# Patient Record
Sex: MALE | Race: WHITE | NOT HISPANIC OR LATINO | Employment: FULL TIME | URBAN - METROPOLITAN AREA
[De-identification: names, ages, dates, MRNs, and addresses within clinical notes are randomized per-mention and may not be internally consistent; named-entity substitution may affect disease eponyms.]

---

## 2017-01-05 ENCOUNTER — ANTICOAGULATION THERAPY VISIT (OUTPATIENT)
Dept: ANTICOAGULATION | Facility: CLINIC | Age: 32
End: 2017-01-05

## 2017-01-05 DIAGNOSIS — Z79.01 LONG-TERM (CURRENT) USE OF ANTICOAGULANTS: ICD-10-CM

## 2017-01-05 DIAGNOSIS — I26.99 PULMONARY EMBOLISM WITH INFARCTION (H): Primary | ICD-10-CM

## 2017-01-05 LAB — INR PPP: 2.6

## 2017-01-05 NOTE — PROGRESS NOTES
ANTICOAGULATION FOLLOW-UP CLINIC VISIT    Patient Name:  Hayden Ramires  Date:  1/5/2017  Contact Type:  Telephone    SUBJECTIVE:     Patient Findings     Positives No Problem Findings           OBJECTIVE    INR   Date Value Ref Range Status   01/05/2017 2.6  Final       ASSESSMENT / PLAN  INR assessment THER    Recheck INR In: 2 WEEKS    INR Location Home INR      Anticoagulation Summary as of 1/5/2017     INR goal 2.0-3.0   Selected INR 2.6 (1/5/2017)   Maintenance plan 10 mg (5 mg x 2) on Thu; 7.5 mg (5 mg x 1.5) all other days   Full instructions 10 mg on Thu; 7.5 mg all other days   Weekly total 55 mg   No change documented Salud Zavala, BRAYDEN   Plan last modified Adam Lo, [facility] (9/15/2016)   Next INR check 1/19/2017   Priority INR   Target end date Indefinite    Indications   Pulmonary embolism with infarction (H) [I26.99] [I26.99]  Long-term (current) use of anticoagulants [Z79.01] [Z79.01]         Anticoagulation Episode Summary     INR check location     Preferred lab     Send INR reminders to [facility]    Comments Emergency Contact Janae Nguyen   [phone]  HIPPA INFO OK to speak with Mother Janae Mittal [phone]  May leave messages on cell phone  pt prefers to be closer to 3 than 2  Contact Ph [phone]      Anticoagulation Care Providers     Provider Role Specialty Phone number    Sinan Lagunas MD Responsible Hematology [phone]            See the Encounter Report to view Anticoagulation Flowsheet and Dosing Calendar (Go to Encounters tab in chart review, and find the Anticoagulation Therapy Visit)    Spoke with Jerome Zavala, RN

## 2017-01-23 ENCOUNTER — ANTICOAGULATION THERAPY VISIT (OUTPATIENT)
Dept: ANTICOAGULATION | Facility: CLINIC | Age: 32
End: 2017-01-23

## 2017-01-23 DIAGNOSIS — Z79.01 LONG-TERM (CURRENT) USE OF ANTICOAGULANTS: ICD-10-CM

## 2017-01-23 DIAGNOSIS — I26.99 PULMONARY EMBOLISM WITH INFARCTION (H): Primary | ICD-10-CM

## 2017-01-23 LAB — INR PPP: 2.1

## 2017-01-23 NOTE — PROGRESS NOTES
ANTICOAGULATION FOLLOW-UP CLINIC VISIT    Patient Name:  Hayden Ramires  Date:  1/23/2017  Contact Type:  Telephone    SUBJECTIVE:        OBJECTIVE    INR   Date Value Ref Range Status   01/23/2017 2.1  Final       ASSESSMENT / PLAN  INR assessment THER    Recheck INR In: 2 WEEKS    INR Location Home INR      Anticoagulation Summary as of 1/23/2017     INR goal 2.0-3.0   Selected INR 2.1 (1/23/2017)   Maintenance plan 10 mg (5 mg x 2) on Thu; 7.5 mg (5 mg x 1.5) all other days   Full instructions 10 mg on Thu; 7.5 mg all other days   Weekly total 55 mg   Plan last modified Adam Lo RP (9/15/2016)   Next INR check 2/6/2017   Priority INR   Target end date Indefinite    Indications   Pulmonary embolism with infarction (H) [I26.99] [I26.99]  Long-term (current) use of anticoagulants [Z79.01] [Z79.01]         Anticoagulation Episode Summary     INR check location     Preferred lab     Send INR reminders to [facility]    Comments Emergency Contact Janae Nguyen   [phone]  HIPPA INFO OK to speak with Mother Janae Mittal [phone]  May leave messages on cell phone  pt prefers to be closer to 3 than 2  Contact Ph [phone]      Anticoagulation Care Providers     Provider Role Specialty Phone number    Sinan Lagunas MD Responsible Hematology [phone]            See the Encounter Report to view Anticoagulation Flowsheet and Dosing Calendar (Go to Encounters tab in chart review, and find the Anticoagulation Therapy Visit)    Spoke with patient. Gave them their lab results and new warfarin recommendation.  No changes in health, medication, or diet. No missed doses, no falls. No signs or symptoms of bleed or clotting.      Adam Lo [facility]

## 2017-02-10 ENCOUNTER — ANTICOAGULATION THERAPY VISIT (OUTPATIENT)
Dept: ANTICOAGULATION | Facility: CLINIC | Age: 32
End: 2017-02-10

## 2017-02-10 DIAGNOSIS — I26.99 PULMONARY EMBOLISM WITH INFARCTION (H): Primary | ICD-10-CM

## 2017-02-10 DIAGNOSIS — Z79.01 LONG-TERM (CURRENT) USE OF ANTICOAGULANTS: ICD-10-CM

## 2017-02-10 LAB — INR PPP: 2.5

## 2017-02-10 NOTE — PROGRESS NOTES
ANTICOAGULATION FOLLOW-UP CLINIC VISIT    Patient Name:  Hayden Ramires  Date:  2/10/2017  Contact Type:  Telephone    SUBJECTIVE:     Patient Findings     Positives No Problem Findings           OBJECTIVE    INR   Date Value Ref Range Status   02/02/2017 2.5  Final       ASSESSMENT / PLAN  INR assessment THER    Recheck INR In: 2 WEEKS    INR Location Home INR      Anticoagulation Summary as of 2/10/2017     INR goal 2.0-3.0   Selected INR 2.5 (2/2/2017)   Maintenance plan 10 mg (5 mg x 2) on Thu; 7.5 mg (5 mg x 1.5) all other days   Full instructions 10 mg on Thu; 7.5 mg all other days   Weekly total 55 mg   Plan last modified Adam Lo, [facility] (9/15/2016)   Next INR check 2/23/2017   Priority INR   Target end date Indefinite    Indications   Pulmonary embolism with infarction (H) [I26.99] [I26.99]  Long-term (current) use of anticoagulants [Z79.01] [Z79.01]         Anticoagulation Episode Summary     INR check location     Preferred lab     Send INR reminders to [facility]    Comments Emergency Contact Janae Nguyen   [phone]  HIPPA INFO OK to speak with Mother Janae Mittal [phone]  May leave messages on cell phone  pt prefers to be closer to 3 than 2  Contact Ph [phone]      Anticoagulation Care Providers     Provider Role Specialty Phone number    Sinan Lagunas MD Responsible Hematology [phone]            See the Encounter Report to view Anticoagulation Flowsheet and Dosing Calendar (Go to Encounters tab in chart review, and find the Anticoagulation Therapy Visit)    Spoke with Hayden.      Salud Bosch RN

## 2017-02-28 ENCOUNTER — ANTICOAGULATION THERAPY VISIT (OUTPATIENT)
Dept: ANTICOAGULATION | Facility: CLINIC | Age: 32
End: 2017-02-28

## 2017-02-28 DIAGNOSIS — Z79.01 LONG-TERM (CURRENT) USE OF ANTICOAGULANTS: ICD-10-CM

## 2017-02-28 DIAGNOSIS — I26.99 PULMONARY EMBOLISM WITH INFARCTION (H): ICD-10-CM

## 2017-02-28 LAB — INR PPP: 2.3

## 2017-02-28 NOTE — MR AVS SNAPSHOT
Hayden TAYLOR Ike   2/28/2017   Anticoagulation Therapy Visit    Description:  31 year old male   Provider:  Soledad Resendiz, RN   Department:  [facility]           INR as of 2/28/2017     Today's INR 2.3      Anticoagulation Summary as of 2/28/2017     INR goal 2.0-3.0   Today's INR 2.3   Full instructions 10 mg on Thu; 7.5 mg all other days   Next INR check 3/14/2017    Indications   Pulmonary embolism with infarction (H) [I26.99] [I26.99]  Long-term (current) use of anticoagulants [Z79.01] [Z79.01]         February 2017 Details    Sun Mon Tue Wed Thu Fri Sat        1               2               3               4                 5               6               7               8               9               10               11                 12               13               14               15               16               17               18                 19               20               21               22               23               24               25                 26               27               28      7.5 mg   See details           Date Details   02/28 This INR check               How to take your warfarin dose     To take:  7.5 mg Take 1.5 of the 5 mg tablets.           March 2017 Details    Sun Mon Tue Wed Thu Fri Sat        1      7.5 mg         2      10 mg         3      7.5 mg         4      7.5 mg           5      7.5 mg         6      7.5 mg         7      7.5 mg         8      7.5 mg         9      10 mg         10      7.5 mg         11      7.5 mg           12      7.5 mg         13      7.5 mg         14            15               16               17               18                 19               20               21               22               23               24               25                 26               27               28               29               30               31                 Date Details   No additional details    Date of next INR:   3/14/2017         How to take your warfarin dose     To take:  7.5 mg Take 1.5 of the 5 mg tablets.    To take:  10 mg Take 2 of the 5 mg tablets.

## 2017-02-28 NOTE — PROGRESS NOTES
ANTICOAGULATION FOLLOW-UP CLINIC VISIT    Patient Name:  Hayden Ramires  Date:  2/28/2017  Contact Type:  Telephone    SUBJECTIVE:     Patient Findings     Positives No Problem Findings           OBJECTIVE    INR   Date Value Ref Range Status   02/28/2017 2.3  Final       ASSESSMENT / PLAN  INR assessment THER    Recheck INR In: 2 WEEKS    INR Location Clinic      Anticoagulation Summary as of 2/28/2017     INR goal 2.0-3.0   Today's INR 2.3   Maintenance plan 10 mg (5 mg x 2) on Thu; 7.5 mg (5 mg x 1.5) all other days   Full instructions 10 mg on Thu; 7.5 mg all other days   Weekly total 55 mg   No change documented Soledad Resendiz, RN   Plan last modified Adam Lo, [facility] (9/15/2016)   Next INR check 3/14/2017   Priority INR   Target end date Indefinite    Indications   Pulmonary embolism with infarction (H) [I26.99] [I26.99]  Long-term (current) use of anticoagulants [Z79.01] [Z79.01]         Anticoagulation Episode Summary     INR check location     Preferred lab     Send INR reminders to [facility]    Comments Emergency Contact Janae Nguyen   [phone]  HIPPA INFO OK to speak with Mother Janae Mittal [phone]  May leave messages on cell phone  pt prefers to be closer to 3 than 2  Contact Ph [phone]      Anticoagulation Care Providers     Provider Role Specialty Phone number    Sinan Lagunas MD Responsible Hematology [phone]            See the Encounter Report to view Anticoagulation Flowsheet and Dosing Calendar (Go to Encounters tab in chart review, and find the Anticoagulation Therapy Visit)    Spoke with Jerome Resendiz, RN

## 2017-03-15 ENCOUNTER — ANTICOAGULATION THERAPY VISIT (OUTPATIENT)
Dept: ANTICOAGULATION | Facility: CLINIC | Age: 32
End: 2017-03-15

## 2017-03-15 DIAGNOSIS — I26.99 PULMONARY EMBOLISM WITH INFARCTION (H): ICD-10-CM

## 2017-03-15 DIAGNOSIS — Z79.01 LONG-TERM (CURRENT) USE OF ANTICOAGULANTS: ICD-10-CM

## 2017-03-15 NOTE — PROGRESS NOTES
ANTICOAGULATION FOLLOW-UP CLINIC VISIT    Patient Name:  Hayden Ramires  Date:  3/15/2017  Contact Type:  Telephone    SUBJECTIVE:        OBJECTIVE    INR   Date Value Ref Range Status   02/28/2017 2.3  Final       ASSESSMENT / PLAN  INR assessment THER    Recheck INR In: 2 WEEKS    INR Location Home INR      Anticoagulation Summary as of 3/15/2017     INR goal 2.0-3.0   Today's INR    Maintenance plan 10 mg (5 mg x 2) on Thu; 7.5 mg (5 mg x 1.5) all other days   Full instructions 10 mg on Thu; 7.5 mg all other days   Weekly total 55 mg   No change documented Adam Lo RPH   Plan last modified Adam Lo RPH (9/15/2016)   Next INR check 3/22/2017   Priority INR   Target end date Indefinite    Indications   Pulmonary embolism with infarction (H) [I26.99] [I26.99]  Long-term (current) use of anticoagulants [Z79.01] [Z79.01]         Anticoagulation Episode Summary     INR check location     Preferred lab     Send INR reminders to [facility]    Comments Emergency Contact Janae Nguyen   [phone]  HIPPA INFO OK to speak with Mother Janae Mittal [phone]  May leave messages on cell phone  pt prefers to be closer to 3 than 2  Contact Ph [phone]      Anticoagulation Care Providers     Provider Role Specialty Phone number    Sinan Lagunas MD Responsible Hematology [phone]            See the Encounter Report to view Anticoagulation Flowsheet and Dosing Calendar (Go to Encounters tab in chart review, and find the Anticoagulation Therapy Visit)    Spoke with Hayden. Reordered his 7.5mg and 10mg tablets of warfarin    Adam Lo RP

## 2017-03-15 NOTE — MR AVS SNAPSHOT
Hayden BRANDON Ramires   3/15/2017   Anticoagulation Therapy Visit    Description:  31 year old male   Provider:  Adam Lo, [facility]   Department:  [facility]           INR as of 3/15/2017     Today's INR       Anticoagulation Summary as of 3/15/2017     INR goal 2.0-3.0   Today's INR    Full instructions 10 mg on Thu; 7.5 mg all other days   Next INR check 3/22/2017    Indications   Pulmonary embolism with infarction (H) [I26.99] [I26.99]  Long-term (current) use of anticoagulants [Z79.01] [Z79.01]         March 2017 Details    Sun Mon Tue Wed Thu Fri Sat        1               2               3               4                 5               6               7               8               9               10               11                 12               13               14               15      7.5 mg   See details      16      10 mg         17      7.5 mg         18      7.5 mg           19      7.5 mg         20      7.5 mg         21      7.5 mg         22            23               24               25                 26               27               28               29               30               31                 Date Details   03/15 This INR check       Date of next INR:  3/22/2017         How to take your warfarin dose     To take:  7.5 mg Take 1.5 of the 5 mg tablets.    To take:  10 mg Take 2 of the 5 mg tablets.

## 2017-04-03 ENCOUNTER — ANTICOAGULATION THERAPY VISIT (OUTPATIENT)
Dept: ANTICOAGULATION | Facility: CLINIC | Age: 32
End: 2017-04-03

## 2017-04-03 DIAGNOSIS — Z79.01 LONG-TERM (CURRENT) USE OF ANTICOAGULANTS: ICD-10-CM

## 2017-04-03 DIAGNOSIS — I26.99 PULMONARY EMBOLISM WITH INFARCTION (H): ICD-10-CM

## 2017-04-03 LAB — INR PPP: 2.4

## 2017-04-03 NOTE — MR AVS SNAPSHOT
Hayden TAYLOR Ike   4/3/2017   Anticoagulation Therapy Visit    Description:  31 year old male   Provider:  Salud Bosch RN   Department:  [facility]           INR as of 4/3/2017     Today's INR 2.4      Anticoagulation Summary as of 4/3/2017     INR goal 2.0-3.0   Today's INR 2.4   Full instructions 10 mg on Thu; 7.5 mg all other days   Next INR check 4/17/2017    Indications   Pulmonary embolism with infarction (H) [I26.99] [I26.99]  Long-term (current) use of anticoagulants [Z79.01] [Z79.01]         April 2017 Details    Sun Mon Tue Wed Thu Fri Sat           1                 2               3      7.5 mg   See details      4      7.5 mg         5      7.5 mg         6      10 mg         7      7.5 mg         8      7.5 mg           9      7.5 mg         10      7.5 mg         11      7.5 mg         12      7.5 mg         13      10 mg         14      7.5 mg         15      7.5 mg           16      7.5 mg         17            18               19               20               21               22                 23               24               25               26               27               28               29                 30                      Date Details   04/03 This INR check       Date of next INR:  4/17/2017         How to take your warfarin dose     To take:  7.5 mg Take 1.5 of the 5 mg tablets.    To take:  10 mg Take 2 of the 5 mg tablets.

## 2017-04-21 ENCOUNTER — ANTICOAGULATION THERAPY VISIT (OUTPATIENT)
Dept: ANTICOAGULATION | Facility: CLINIC | Age: 32
End: 2017-04-21

## 2017-04-21 DIAGNOSIS — I26.99 PULMONARY EMBOLISM WITH INFARCTION (H): ICD-10-CM

## 2017-04-21 DIAGNOSIS — Z79.01 LONG-TERM (CURRENT) USE OF ANTICOAGULANTS: ICD-10-CM

## 2017-04-21 LAB — INR PPP: 2.1

## 2017-04-21 NOTE — PROGRESS NOTES
ANTICOAGULATION FOLLOW-UP CLINIC VISIT    Patient Name:  Hayden Ramires  Date:  4/21/2017  Contact Type:  Telephone    SUBJECTIVE:     Patient Findings     Positives No Problem Findings           OBJECTIVE    INR   Date Value Ref Range Status   04/20/2017 2.1  Final       ASSESSMENT / PLAN  INR assessment THER    Recheck INR In: 2 WEEKS    INR Location Home INR      Anticoagulation Summary as of 4/21/2017     INR goal 2.0-3.0   Today's INR 2.1 (4/20/2017)   Maintenance plan 10 mg (5 mg x 2) on Thu; 7.5 mg (5 mg x 1.5) all other days   Full instructions 10 mg on Thu; 7.5 mg all other days   Weekly total 55 mg   No change documented Salud Zavala, BRAYDEN   Plan last modified Adam Lo, [facility] (9/15/2016)   Next INR check 5/5/2017   Priority INR   Target end date Indefinite    Indications   Pulmonary embolism with infarction (H) [I26.99] [I26.99]  Long-term (current) use of anticoagulants [Z79.01] [Z79.01]         Anticoagulation Episode Summary     INR check location     Preferred lab     Send INR reminders to [facility]    Comments Emergency Contact Janae Nguyen   [phone]  HIPPA INFO OK to speak with Mother Janae Mittal [phone]  May leave messages on cell phone  pt prefers to be closer to 3 than 2  Contact Ph [phone]      Anticoagulation Care Providers     Provider Role Specialty Phone number    Sinan Lagunas MD Responsible Hematology [phone]            See the Encounter Report to view Anticoagulation Flowsheet and Dosing Calendar (Go to Encounters tab in chart review, and find the Anticoagulation Therapy Visit)    Spoke with Jerome Zavala, RN

## 2017-04-21 NOTE — MR AVS SNAPSHOT
Hayden TAYLOR Ike   4/21/2017   Anticoagulation Therapy Visit    Description:  31 year old male   Provider:  Salud Zavala, RN   Department:  [facility]           INR as of 4/21/2017     Today's INR 2.1 (4/20/2017)      Anticoagulation Summary as of 4/21/2017     INR goal 2.0-3.0   Today's INR 2.1 (4/20/2017)   Full instructions 10 mg on Thu; 7.5 mg all other days   Next INR check 5/5/2017    Indications   Pulmonary embolism with infarction (H) [I26.99] [I26.99]  Long-term (current) use of anticoagulants [Z79.01] [Z79.01]         April 2017 Details    Sun Mon Tue Wed Thu Fri Sat           1                 2               3               4               5               6               7               8                 9               10               11               12               13               14               15                 16               17               18               19               20               21      7.5 mg   See details      22      7.5 mg           23      7.5 mg         24      7.5 mg         25      7.5 mg         26      7.5 mg         27      10 mg         28      7.5 mg         29      7.5 mg           30      7.5 mg                Date Details   04/21 This INR check               How to take your warfarin dose     To take:  7.5 mg Take 1.5 of the 5 mg tablets.    To take:  10 mg Take 2 of the 5 mg tablets.           May 2017 Details    Sun Mon Tue Wed Thu Fri Sat      1      7.5 mg         2      7.5 mg         3      7.5 mg         4      10 mg         5            6                 7               8               9               10               11               12               13                 14               15               16               17               18               19               20                 21               22               23               24               25               26               27                 28               29                30               31                   Date Details   No additional details    Date of next INR:  5/5/2017         How to take your warfarin dose     To take:  7.5 mg Take 1.5 of the 5 mg tablets.    To take:  10 mg Take 2 of the 5 mg tablets.

## 2017-05-08 ENCOUNTER — ANTICOAGULATION THERAPY VISIT (OUTPATIENT)
Dept: ANTICOAGULATION | Facility: CLINIC | Age: 32
End: 2017-05-08

## 2017-05-08 DIAGNOSIS — Z79.01 LONG-TERM (CURRENT) USE OF ANTICOAGULANTS: ICD-10-CM

## 2017-05-08 DIAGNOSIS — I26.99 PULMONARY EMBOLISM WITH INFARCTION (H): ICD-10-CM

## 2017-05-08 LAB — INR PPP: 2.3

## 2017-05-08 NOTE — MR AVS SNAPSHOT
Hayden TAYLOR Ike   5/8/2017   Anticoagulation Therapy Visit    Description:  31 year old male   Provider:  Salud Zavala, RN   Department:  [facility]           INR as of 5/8/2017     Today's INR 2.3      Anticoagulation Summary as of 5/8/2017     INR goal 2.0-3.0   Today's INR 2.3   Full instructions 10 mg on Thu; 7.5 mg all other days   Next INR check 5/22/2017    Indications   Pulmonary embolism with infarction (H) [I26.99] [I26.99]  Long-term (current) use of anticoagulants [Z79.01] [Z79.01]         May 2017 Details    Sun Mon Tue Wed Thu Fri Sat      1               2               3               4               5               6                 7               8      7.5 mg   See details      9      7.5 mg         10      7.5 mg         11      10 mg         12      7.5 mg         13      7.5 mg           14      7.5 mg         15      7.5 mg         16      7.5 mg         17      7.5 mg         18      10 mg         19      7.5 mg         20      7.5 mg           21      7.5 mg         22            23               24               25               26               27                 28               29               30               31                   Date Details   05/08 This INR check       Date of next INR:  5/22/2017         How to take your warfarin dose     To take:  7.5 mg Take 1.5 of the 5 mg tablets.    To take:  10 mg Take 2 of the 5 mg tablets.

## 2017-05-08 NOTE — PROGRESS NOTES
ANTICOAGULATION FOLLOW-UP CLINIC VISIT    Patient Name:  Hayden Ramires  Date:  5/8/2017  Contact Type:  Telephone    SUBJECTIVE:     Patient Findings     Positives No Problem Findings           OBJECTIVE    INR   Date Value Ref Range Status   05/08/2017 2.3  Final       ASSESSMENT / PLAN  INR assessment THER    Recheck INR In: 2 WEEKS    INR Location Home INR      Anticoagulation Summary as of 5/8/2017     INR goal 2.0-3.0   Today's INR 2.3   Maintenance plan 10 mg (5 mg x 2) on Thu; 7.5 mg (5 mg x 1.5) all other days   Full instructions 10 mg on Thu; 7.5 mg all other days   Weekly total 55 mg   No change documented Salud Zavala RN   Plan last modified Adam Lo, [facility] (9/15/2016)   Next INR check 5/22/2017   Priority INR   Target end date Indefinite    Indications   Pulmonary embolism with infarction (H) [I26.99] [I26.99]  Long-term (current) use of anticoagulants [Z79.01] [Z79.01]         Anticoagulation Episode Summary     INR check location     Preferred lab     Send INR reminders to [facility]    Comments Emergency Contact Janae Nguyen   [phone]  HIPPA INFO OK to speak with Mother Janae Mittal [phone]  May leave messages on cell phone  pt prefers to be closer to 3 than 2  Contact Ph [phone]      Anticoagulation Care Providers     Provider Role Specialty Phone number    Sinan Lagunas MD Responsible Hematology [phone]            See the Encounter Report to view Anticoagulation Flowsheet and Dosing Calendar (Go to Encounters tab in chart review, and find the Anticoagulation Therapy Visit)    Spoke with Hayden Zavala RN

## 2017-05-25 ENCOUNTER — ANTICOAGULATION THERAPY VISIT (OUTPATIENT)
Dept: ANTICOAGULATION | Facility: CLINIC | Age: 32
End: 2017-05-25

## 2017-05-25 DIAGNOSIS — Z79.01 LONG-TERM (CURRENT) USE OF ANTICOAGULANTS: ICD-10-CM

## 2017-05-25 DIAGNOSIS — I26.99 PULMONARY EMBOLISM WITH INFARCTION (H): ICD-10-CM

## 2017-05-25 LAB — INR PPP: 2

## 2017-05-25 NOTE — PROGRESS NOTES
ANTICOAGULATION FOLLOW-UP CLINIC VISIT    Patient Name:  Hayden Ramires  Date:  5/25/2017  Contact Type:  Telephone    SUBJECTIVE:     Patient Findings     Comments No Problems found. No Changes in Health, Medications, or diet. No Signs of bruising, bleeding or clotting.           OBJECTIVE    INR   Date Value Ref Range Status   05/25/2017 2.0  Final       ASSESSMENT / PLAN  INR assessment THER    Recheck INR In: 2 WEEKS    INR Location Home INR      Anticoagulation Summary as of 5/25/2017     INR goal 2.0-3.0   Today's INR 2.0   Maintenance plan 10 mg (5 mg x 2) on Thu; 7.5 mg (5 mg x 1.5) all other days   Full instructions 10 mg on Thu; 7.5 mg all other days   Weekly total 55 mg   Plan last modified Adam Lo RPH (9/15/2016)   Next INR check 6/8/2017   Priority INR   Target end date Indefinite    Indications   Pulmonary embolism with infarction (H) [I26.99] [I26.99]  Long-term (current) use of anticoagulants [Z79.01] [Z79.01]         Anticoagulation Episode Summary     INR check location     Preferred lab     Send INR reminders to [facility]    Comments Emergency Contact Janae Nguyen   [phone]  HIPPA INFO OK to speak with Mother Janae Mittal [phone]  May leave messages on cell phone  pt prefers to be closer to 3 than 2  Contact Ph [phone]      Anticoagulation Care Providers     Provider Role Specialty Phone number    Sinan Lagunas MD Responsible Hematology [phone]            See the Encounter Report to view Anticoagulation Flowsheet and Dosing Calendar (Go to Encounters tab in chart review, and find the Anticoagulation Therapy Visit)    Spoke with patient. Gave them their lab results and new warfarin recommendation.  No changes in health, medication, or diet. No missed doses, no falls. No signs or symptoms of bleed or clotting.      Adam Lo RPH

## 2017-05-25 NOTE — MR AVS SNAPSHOT
Hayden TAYLOR Ike   5/25/2017   Anticoagulation Therapy Visit    Description:  31 year old male   Provider:  Adam Lo, [facility]   Department:  [facility]           INR as of 5/25/2017     Today's INR 2.0      Anticoagulation Summary as of 5/25/2017     INR goal 2.0-3.0   Today's INR 2.0   Full instructions 10 mg on Thu; 7.5 mg all other days   Next INR check 6/8/2017    Indications   Pulmonary embolism with infarction (H) [I26.99] [I26.99]  Long-term (current) use of anticoagulants [Z79.01] [Z79.01]         May 2017 Details    Sun Mon Tue Wed Thu Fri Sat      1               2               3               4               5               6                 7               8               9               10               11               12               13                 14               15               16               17               18               19               20                 21               22               23               24               25      10 mg   See details      26      7.5 mg         27      7.5 mg           28      7.5 mg         29      7.5 mg         30      7.5 mg         31      7.5 mg             Date Details   05/25 This INR check               How to take your warfarin dose     To take:  7.5 mg Take 1.5 of the 5 mg tablets.    To take:  10 mg Take 2 of the 5 mg tablets.           June 2017 Details    Sun Mon Tue Wed Thu Fri Sat         1      10 mg         2      7.5 mg         3      7.5 mg           4      7.5 mg         5      7.5 mg         6      7.5 mg         7      7.5 mg         8            9               10                 11               12               13               14               15               16               17                 18               19               20               21               22               23               24                 25               26               27               28               29               30                  Date Details   No additional details    Date of next INR:  6/8/2017         How to take your warfarin dose     To take:  7.5 mg Take 1.5 of the 5 mg tablets.    To take:  10 mg Take 2 of the 5 mg tablets.

## 2017-06-12 ENCOUNTER — ANTICOAGULATION THERAPY VISIT (OUTPATIENT)
Dept: ANTICOAGULATION | Facility: CLINIC | Age: 32
End: 2017-06-12

## 2017-06-12 DIAGNOSIS — I26.99 PULMONARY EMBOLISM WITH INFARCTION (H): ICD-10-CM

## 2017-06-12 DIAGNOSIS — Z79.01 LONG-TERM (CURRENT) USE OF ANTICOAGULANTS: ICD-10-CM

## 2017-06-12 LAB — INR PPP: 2.1

## 2017-06-12 NOTE — MR AVS SNAPSHOT
Hayden TAYLOR Ike   6/12/2017   Anticoagulation Therapy Visit    Description:  31 year old male   Provider:  Adam Lo, [facility]   Department:  [facility]           INR as of 6/12/2017     Today's INR 2.1      Anticoagulation Summary as of 6/12/2017     INR goal 2.0-3.0   Today's INR 2.1   Full instructions 10 mg on Thu; 7.5 mg all other days   Next INR check 6/26/2017    Indications   Pulmonary embolism with infarction (H) [I26.99] [I26.99]  Long-term (current) use of anticoagulants [Z79.01] [Z79.01]         June 2017 Details    Sun Mon Tue Wed Thu Fri Sat         1               2               3                 4               5               6               7               8               9               10                 11               12      7.5 mg   See details      13      7.5 mg         14      7.5 mg         15      10 mg         16      7.5 mg         17      7.5 mg           18      7.5 mg         19      7.5 mg         20      7.5 mg         21      7.5 mg         22      10 mg         23      7.5 mg         24      7.5 mg           25      7.5 mg         26            27               28               29               30                 Date Details   06/12 This INR check       Date of next INR:  6/26/2017         How to take your warfarin dose     To take:  7.5 mg Take 1.5 of the 5 mg tablets.    To take:  10 mg Take 2 of the 5 mg tablets.

## 2017-06-12 NOTE — PROGRESS NOTES
ANTICOAGULATION FOLLOW-UP CLINIC VISIT    Patient Name:  Hayden Ramires  Date:  6/12/2017  Contact Type:  Telephone    SUBJECTIVE:     Patient Findings     Comments No Problems found. No Changes in Health, Medications, or diet. No Signs of bruising, bleeding or clotting.           OBJECTIVE    INR   Date Value Ref Range Status   06/12/2017 2.1  Final       ASSESSMENT / PLAN  INR assessment THER    Recheck INR In: 2 WEEKS    INR Location Home INR      Anticoagulation Summary as of 6/12/2017     INR goal 2.0-3.0   Today's INR 2.1   Maintenance plan 10 mg (5 mg x 2) on Thu; 7.5 mg (5 mg x 1.5) all other days   Full instructions 10 mg on Thu; 7.5 mg all other days   Weekly total 55 mg   Plan last modified Adam Lo RPH (9/15/2016)   Next INR check 6/26/2017   Priority INR   Target end date Indefinite    Indications   Pulmonary embolism with infarction (H) [I26.99] [I26.99]  Long-term (current) use of anticoagulants [Z79.01] [Z79.01]         Anticoagulation Episode Summary     INR check location     Preferred lab     Send INR reminders to [facility]    Comments Emergency Contact Janae Nguyen   [phone]  HIPPA INFO OK to speak with Mother Janae Mittal [phone]  May leave messages on cell phone  pt prefers to be closer to 3 than 2  Contact Ph [phone]      Anticoagulation Care Providers     Provider Role Specialty Phone number    Sinan Lagunas MD Responsible Hematology [phone]            See the Encounter Report to view Anticoagulation Flowsheet and Dosing Calendar (Go to Encounters tab in chart review, and find the Anticoagulation Therapy Visit)    Spoke with patient. Gave them their lab results and new warfarin recommendation.  No changes in health, medication, or diet. No missed doses, no falls. No signs or symptoms of bleed or clotting.      Adam Lo RPH

## 2017-06-29 ENCOUNTER — ANTICOAGULATION THERAPY VISIT (OUTPATIENT)
Dept: ANTICOAGULATION | Facility: CLINIC | Age: 32
End: 2017-06-29

## 2017-06-29 DIAGNOSIS — Z79.01 LONG-TERM (CURRENT) USE OF ANTICOAGULANTS: ICD-10-CM

## 2017-06-29 DIAGNOSIS — I26.99 PULMONARY EMBOLISM WITH INFARCTION (H): ICD-10-CM

## 2017-06-29 LAB — INR PPP: 2.3

## 2017-06-29 NOTE — PROGRESS NOTES
ANTICOAGULATION FOLLOW-UP CLINIC VISIT    Patient Name:  Hayden Ramires  Date:  6/29/2017  Contact Type:  Telephone    SUBJECTIVE:     Patient Findings     Positives No Problem Findings           OBJECTIVE    INR   Date Value Ref Range Status   06/29/2017 2.3  Final       ASSESSMENT / PLAN  INR assessment THER    Recheck INR In: 2 WEEKS    INR Location Home INR      Anticoagulation Summary as of 6/29/2017     INR goal 2.0-3.0   Today's INR 2.3   Maintenance plan 10 mg (5 mg x 2) on Thu; 7.5 mg (5 mg x 1.5) all other days   Full instructions 10 mg on Thu; 7.5 mg all other days   Weekly total 55 mg   Plan last modified Adam Lo, [facility] (9/15/2016)   Next INR check 7/13/2017   Priority INR   Target end date Indefinite    Indications   Pulmonary embolism with infarction (H) [I26.99] [I26.99]  Long-term (current) use of anticoagulants [Z79.01] [Z79.01]         Anticoagulation Episode Summary     INR check location     Preferred lab     Send INR reminders to [facility]    Comments Emergency Contact Janae Nguyen   [phone]  HIPPA INFO OK to speak with Mother Janae Mittal [phone]  May leave messages on cell phone  pt prefers to be closer to 3 than 2  Contact Ph [phone]      Anticoagulation Care Providers     Provider Role Specialty Phone number    Sinan Lagunas MD Responsible Hematology [phone]            See the Encounter Report to view Anticoagulation Flowsheet and Dosing Calendar (Go to Encounters tab in chart review, and find the Anticoagulation Therapy Visit)    Spoke with HaydenNyla Resendiz RN

## 2017-06-29 NOTE — MR AVS SNAPSHOT
Hayden TAYLOR Ike   6/29/2017   Anticoagulation Therapy Visit    Description:  31 year old male   Provider:  Soledad Resendiz, RN   Department:  [facility]           INR as of 6/29/2017     Today's INR 2.3      Anticoagulation Summary as of 6/29/2017     INR goal 2.0-3.0   Today's INR 2.3   Full instructions 10 mg on Thu; 7.5 mg all other days   Next INR check 7/13/2017    Indications   Pulmonary embolism with infarction (H) [I26.99] [I26.99]  Long-term (current) use of anticoagulants [Z79.01] [Z79.01]         June 2017 Details    Sun Mon Tue Wed Thu Fri Sat         1               2               3                 4               5               6               7               8               9               10                 11               12               13               14               15               16               17                 18               19               20               21               22               23               24                 25               26               27               28               29      10 mg   See details      30      7.5 mg           Date Details   06/29 This INR check               How to take your warfarin dose     To take:  7.5 mg Take 1.5 of the 5 mg tablets.    To take:  10 mg Take 2 of the 5 mg tablets.           July 2017 Details    Sun Mon Tue Wed Thu Fri Sat           1      7.5 mg           2      7.5 mg         3      7.5 mg         4      7.5 mg         5      7.5 mg         6      10 mg         7      7.5 mg         8      7.5 mg           9      7.5 mg         10      7.5 mg         11      7.5 mg         12      7.5 mg         13            14               15                 16               17               18               19               20               21               22                 23               24               25               26               27               28               29                 30               31                      Date Details   No additional details    Date of next INR:  7/13/2017         How to take your warfarin dose     To take:  7.5 mg Take 1.5 of the 5 mg tablets.    To take:  10 mg Take 2 of the 5 mg tablets.

## 2017-07-19 ENCOUNTER — ANTICOAGULATION THERAPY VISIT (OUTPATIENT)
Dept: ANTICOAGULATION | Facility: CLINIC | Age: 32
End: 2017-07-19

## 2017-07-19 DIAGNOSIS — Z79.01 LONG-TERM (CURRENT) USE OF ANTICOAGULANTS: ICD-10-CM

## 2017-07-19 DIAGNOSIS — I26.99 PULMONARY EMBOLISM WITH INFARCTION (H): ICD-10-CM

## 2017-07-19 LAB — INR PPP: 2.9

## 2017-07-19 NOTE — MR AVS SNAPSHOT
Hayden TAYLOR Ike   7/19/2017   Anticoagulation Therapy Visit    Description:  31 year old male   Provider:  Adam Lo, [facility]   Department:  [facility]           INR as of 7/19/2017     Today's INR 2.9      Anticoagulation Summary as of 7/19/2017     INR goal 2.0-3.0   Today's INR 2.9   Full instructions 10 mg on Thu; 7.5 mg all other days   Next INR check 8/2/2017    Indications   Pulmonary embolism with infarction (H) [I26.99] [I26.99]  Long-term (current) use of anticoagulants [Z79.01] [Z79.01]         July 2017 Details    Sun Mon Tue Wed Thu Fri Sat           1                 2               3               4               5               6               7               8                 9               10               11               12               13               14               15                 16               17               18               19      7.5 mg   See details      20      10 mg         21      7.5 mg         22      7.5 mg           23      7.5 mg         24      7.5 mg         25      7.5 mg         26      7.5 mg         27      10 mg         28      7.5 mg         29      7.5 mg           30      7.5 mg         31      7.5 mg               Date Details   07/19 This INR check               How to take your warfarin dose     To take:  7.5 mg Take 1.5 of the 5 mg tablets.    To take:  10 mg Take 2 of the 5 mg tablets.           August 2017 Details    Sun Mon Tue Wed Thu Fri Sat       1      7.5 mg         2            3               4               5                 6               7               8               9               10               11               12                 13               14               15               16               17               18               19                 20               21               22               23               24               25               26                 27               28               29                30               31                  Date Details   No additional details    Date of next INR:  8/2/2017         How to take your warfarin dose     To take:  7.5 mg Take 1.5 of the 5 mg tablets.

## 2017-07-19 NOTE — PROGRESS NOTES
ANTICOAGULATION FOLLOW-UP CLINIC VISIT    Patient Name:  Hayden Ramires  Date:  7/19/2017  Contact Type:  Telephone    SUBJECTIVE:     Patient Findings     Positives OTC meds, Activity level change    Comments Spoke with patient and he explained that he has been increasing his exercise lately. He also took Advil a couple of times this week because of the soreness. He said that this isn't going to be long term use and that he's aware it can raise his INR.           OBJECTIVE    INR   Date Value Ref Range Status   07/19/2017 2.9  Final       ASSESSMENT / PLAN  INR assessment THER    Recheck INR In: 2 WEEKS    INR Location Home INR      Anticoagulation Summary as of 7/19/2017     INR goal 2.0-3.0   Today's INR 2.9   Maintenance plan 10 mg (5 mg x 2) on Thu; 7.5 mg (5 mg x 1.5) all other days   Full instructions 10 mg on Thu; 7.5 mg all other days   Weekly total 55 mg   Plan last modified Adam Lo, [facility] (9/15/2016)   Next INR check 8/2/2017   Priority INR   Target end date Indefinite    Indications   Pulmonary embolism with infarction (H) [I26.99] [I26.99]  Long-term (current) use of anticoagulants [Z79.01] [Z79.01]         Anticoagulation Episode Summary     INR check location     Preferred lab     Send INR reminders to [facility]    Comments Emergency Contact Janae Nguyen   [phone]  HIPPA INFO OK to speak with Mother Janae Mittal [phone]  May leave messages on cell phone  pt prefers to be closer to 3 than 2  Contact Ph [phone]      Anticoagulation Care Providers     Provider Role Specialty Phone number    Sinan Lagunas MD Responsible Hematology [phone]            See the Encounter Report to view Anticoagulation Flowsheet and Dosing Calendar (Go to Encounters tab in chart review, and find the Anticoagulation Therapy Visit)    Spoke with patient on the phone. Told him his INR and our plan for dosing and follow-up. He has recently increased his exercise and  taken Advil for the soreness. He knows this can increase his INR and said that he will not being doing this long term. He has not had any changes in his medications.     Tomasa Green PharmD3/Luis Angel Lo [facility]

## 2017-08-07 ENCOUNTER — ANTICOAGULATION THERAPY VISIT (OUTPATIENT)
Dept: ANTICOAGULATION | Facility: CLINIC | Age: 32
End: 2017-08-07

## 2017-08-07 DIAGNOSIS — Z79.01 LONG-TERM (CURRENT) USE OF ANTICOAGULANTS: ICD-10-CM

## 2017-08-07 DIAGNOSIS — I26.99 PULMONARY EMBOLISM WITH INFARCTION (H): ICD-10-CM

## 2017-08-07 LAB — INR PPP: 3

## 2017-08-07 NOTE — PROGRESS NOTES
ANTICOAGULATION FOLLOW-UP CLINIC VISIT    Patient Name:  Hayden Ramires  Date:  8/7/2017  Contact Type:  Telephone    SUBJECTIVE:        OBJECTIVE    INR   Date Value Ref Range Status   08/07/2017 3.0  Final       ASSESSMENT / PLAN  INR assessment THER    Recheck INR In: 2 WEEKS    INR Location Home INR      Anticoagulation Summary as of 8/7/2017     INR goal 2.0-3.0   Today's INR 3.0   Maintenance plan 10 mg (5 mg x 2) on Thu; 7.5 mg (5 mg x 1.5) all other days   Full instructions 10 mg on Thu; 7.5 mg all other days   Weekly total 55 mg   Plan last modified Adam Lo, [facility] (9/15/2016)   Next INR check 8/21/2017   Priority INR   Target end date Indefinite    Indications   Pulmonary embolism with infarction (H) [I26.99] [I26.99]  Long-term (current) use of anticoagulants [Z79.01] [Z79.01]         Anticoagulation Episode Summary     INR check location     Preferred lab     Send INR reminders to [facility]    Comments Emergency Contact Janae Nguyen   [phone]  HIPPA INFO OK to speak with Mother Janae Mittal [phone]  May leave messages on cell phone  pt prefers to be closer to 3 than 2  Contact Ph [phone]      Anticoagulation Care Providers     Provider Role Specialty Phone number    Sinan Lagunas MD Responsible Hematology [phone]            See the Encounter Report to view Anticoagulation Flowsheet and Dosing Calendar (Go to Encounters tab in chart review, and find the Anticoagulation Therapy Visit)    Left message for patient with results and dosing recommendations. Asked patient to call back to report any missed doses, falls, signs and symptoms of bleeding or clotting, any changes in health, medication, or diet. Asked patient to call back with any questions or concerns.      Bita Beach RN

## 2017-08-07 NOTE — MR AVS SNAPSHOT
Hayden TAYLOR Ike   8/7/2017   Anticoagulation Therapy Visit    Description:  31 year old male   Provider:  Bita Beach RN   Department:  [facility]           INR as of 8/7/2017     Today's INR 3.0      Anticoagulation Summary as of 8/7/2017     INR goal 2.0-3.0   Today's INR 3.0   Full instructions 10 mg on Thu; 7.5 mg all other days   Next INR check 8/21/2017    Indications   Pulmonary embolism with infarction (H) [I26.99] [I26.99]  Long-term (current) use of anticoagulants [Z79.01] [Z79.01]         August 2017 Details    Sun Mon Tue Wed Thu Fri Sat       1               2               3               4               5                 6               7      7.5 mg   See details      8      7.5 mg         9      7.5 mg         10      10 mg         11      7.5 mg         12      7.5 mg           13      7.5 mg         14      7.5 mg         15      7.5 mg         16      7.5 mg         17      10 mg         18      7.5 mg         19      7.5 mg           20      7.5 mg         21            22               23               24               25               26                 27               28               29               30               31                  Date Details   08/07 This INR check       Date of next INR:  8/21/2017         How to take your warfarin dose     To take:  7.5 mg Take 1.5 of the 5 mg tablets.    To take:  10 mg Take 2 of the 5 mg tablets.

## 2017-08-23 ENCOUNTER — ANTICOAGULATION THERAPY VISIT (OUTPATIENT)
Dept: ANTICOAGULATION | Facility: CLINIC | Age: 32
End: 2017-08-23

## 2017-08-23 DIAGNOSIS — I26.99 PULMONARY EMBOLISM WITH INFARCTION (H): ICD-10-CM

## 2017-08-23 DIAGNOSIS — Z79.01 LONG-TERM (CURRENT) USE OF ANTICOAGULANTS: ICD-10-CM

## 2017-08-23 LAB — INR PPP: 1.3

## 2017-08-23 NOTE — MR AVS SNAPSHOT
Hayden TAYLOR Ike   8/23/2017   Anticoagulation Therapy Visit    Description:  31 year old male   Provider:  Bita Beach RN   Department:  [facility]           INR as of 8/23/2017     Today's INR 1.3!      Anticoagulation Summary as of 8/23/2017     INR goal 2.0-3.0   Today's INR 1.3!   Full instructions 8/23: 10 mg; 8/25: 10 mg; Otherwise 10 mg on Thu; 7.5 mg all other days   Next INR check 9/6/2017    Indications   Pulmonary embolism with infarction (H) [I26.99] [I26.99]  Long-term (current) use of anticoagulants [Z79.01] [Z79.01]         August 2017 Details    Sun Mon Tue Wed Thu Fri Sat       1               2               3               4               5                 6               7               8               9               10               11               12                 13               14               15               16               17               18               19                 20               21               22               23      10 mg   See details      24      10 mg         25      10 mg         26      7.5 mg           27      7.5 mg         28      7.5 mg         29      7.5 mg         30      7.5 mg         31      10 mg            Date Details   08/23 This INR check               How to take your warfarin dose     To take:  7.5 mg Take 1.5 of the 5 mg tablets.    To take:  10 mg Take 2 of the 5 mg tablets.           September 2017 Details    Sun Mon Tue Wed Thu Fri Sat          1      7.5 mg         2      7.5 mg           3      7.5 mg         4      7.5 mg         5      7.5 mg         6            7               8               9                 10               11               12               13               14               15               16                 17               18               19               20               21               22               23                 24               25               26               27                28               29               30                Date Details   No additional details    Date of next INR:  9/6/2017         How to take your warfarin dose     To take:  7.5 mg Take 1.5 of the 5 mg tablets.

## 2017-08-23 NOTE — PROGRESS NOTES
ANTICOAGULATION FOLLOW-UP CLINIC VISIT    Patient Name:  Hayden Ramires  Date:  8/23/2017  Contact Type:  Telephone    SUBJECTIVE:     Patient Findings     Comments Pt said that he missed 2-3 doses of coumadin last week.   Pt was working at a [employer] , and eating different foods.    Pt is in the process of moving.  Pt wants to do next INR in 2 weeks as he is going out of down.           OBJECTIVE    INR   Date Value Ref Range Status   08/23/2017 1.3  Final       ASSESSMENT / PLAN  INR assessment SUB    Recheck INR In: 2 WEEKS    INR Location Home INR      Anticoagulation Summary as of 8/23/2017     INR goal 2.0-3.0   Today's INR 1.3!   Maintenance plan 10 mg (5 mg x 2) on Thu; 7.5 mg (5 mg x 1.5) all other days   Full instructions 8/23: 10 mg; 8/25: 10 mg; Otherwise 10 mg on Thu; 7.5 mg all other days   Weekly total 55 mg   Plan last modified Adam Lo, [facility] (9/15/2016)   Next INR check 9/6/2017   Priority INR   Target end date Indefinite    Indications   Pulmonary embolism with infarction (H) [I26.99] [I26.99]  Long-term (current) use of anticoagulants [Z79.01] [Z79.01]         Anticoagulation Episode Summary     INR check location     Preferred lab     Send INR reminders to [facility]    Comments Emergency Contact Janae Nguyen   [phone]  HIPPA INFO OK to speak with Mother Janae Mittal [phone]  May leave messages on cell phone  pt prefers to be closer to 3 than 2  Contact  [phone]      Anticoagulation Care Providers     Provider Role Specialty Phone number    Sinan Lagunas MD Responsible Hematology [phone]            See the Encounter Report to view Anticoagulation Flowsheet and Dosing Calendar (Go to Encounters tab in chart review, and find the Anticoagulation Therapy Visit)  Spoke with patient.    Bita Beach RN

## 2017-09-11 ENCOUNTER — ANTICOAGULATION THERAPY VISIT (OUTPATIENT)
Dept: ANTICOAGULATION | Facility: CLINIC | Age: 32
End: 2017-09-11

## 2017-09-11 DIAGNOSIS — Z79.01 LONG-TERM (CURRENT) USE OF ANTICOAGULANTS: ICD-10-CM

## 2017-09-11 DIAGNOSIS — I26.99 PULMONARY EMBOLISM WITH INFARCTION (H): ICD-10-CM

## 2017-09-11 LAB — INR PPP: 2.3

## 2017-09-11 NOTE — MR AVS SNAPSHOT
Haydne TAYLOR Ike   9/11/2017   Anticoagulation Therapy Visit    Description:  31 year old male   Provider:  Adam Lo, [facility]   Department:  [facility]           INR as of 9/11/2017     Today's INR 2.3      Anticoagulation Summary as of 9/11/2017     INR goal 2.0-3.0   Today's INR 2.3   Full instructions 10 mg on Thu; 7.5 mg all other days   Next INR check 9/25/2017    Indications   Pulmonary embolism with infarction (H) [I26.99] [I26.99]  Long-term (current) use of anticoagulants [Z79.01] [Z79.01]         September 2017 Details    Sun Mon Tue Wed Thu Fri Sat          1               2                 3               4               5               6               7               8               9                 10               11      7.5 mg   See details      12      7.5 mg         13      7.5 mg         14      10 mg         15      7.5 mg         16      7.5 mg           17      7.5 mg         18      7.5 mg         19      7.5 mg         20      7.5 mg         21      10 mg         22      7.5 mg         23      7.5 mg           24      7.5 mg         25            26               27               28               29               30                Date Details   09/11 This INR check       Date of next INR:  9/25/2017         How to take your warfarin dose     To take:  7.5 mg Take 1.5 of the 5 mg tablets.    To take:  10 mg Take 2 of the 5 mg tablets.

## 2017-09-11 NOTE — PROGRESS NOTES
ANTICOAGULATION FOLLOW-UP CLINIC VISIT    Patient Name:  Hayden Ramires  Date:  9/11/2017  Contact Type:  Telephone    SUBJECTIVE:     Patient Findings     Positives No Problem Findings           OBJECTIVE    INR   Date Value Ref Range Status   09/11/2017 2.3  Final       ASSESSMENT / PLAN  INR assessment THER    Recheck INR In: 2 WEEKS    INR Location Home INR      Anticoagulation Summary as of 9/11/2017     INR goal 2.0-3.0   Today's INR 2.3   Maintenance plan 10 mg (5 mg x 2) on Thu; 7.5 mg (5 mg x 1.5) all other days   Full instructions 10 mg on Thu; 7.5 mg all other days   Weekly total 55 mg   Plan last modified Adam Lo RP (9/15/2016)   Next INR check 9/25/2017   Priority INR   Target end date Indefinite    Indications   Pulmonary embolism with infarction (H) [I26.99] [I26.99]  Long-term (current) use of anticoagulants [Z79.01] [Z79.01]         Anticoagulation Episode Summary     INR check location     Preferred lab     Send INR reminders to [facility]    Comments Emergency Contact Janae Nguyen   [phone]  HIPPA INFO OK to speak with Mother Janae Mittal [phone]  May leave messages on cell phone  pt prefers to be closer to 3 than 2  Contact Ph [phone]      Anticoagulation Care Providers     Provider Role Specialty Phone number    Sinan Lagunas MD Responsible Hematology [phone]            See the Encounter Report to view Anticoagulation Flowsheet and Dosing Calendar (Go to Encounters tab in chart review, and find the Anticoagulation Therapy Visit)    Spoke with patient. Gave them their lab results and new warfarin recommendation.  No changes in health, medication, or diet. No missed doses, no falls. No signs or symptoms of bleed or clotting.      Adam Lo [facility]

## 2017-09-29 ENCOUNTER — ANTICOAGULATION THERAPY VISIT (OUTPATIENT)
Dept: ANTICOAGULATION | Facility: CLINIC | Age: 32
End: 2017-09-29

## 2017-09-29 DIAGNOSIS — Z79.01 LONG-TERM (CURRENT) USE OF ANTICOAGULANTS: ICD-10-CM

## 2017-09-29 DIAGNOSIS — I26.99 PULMONARY EMBOLISM WITH INFARCTION (H): ICD-10-CM

## 2017-09-29 LAB — INR PPP: 2.2

## 2017-09-29 NOTE — MR AVS SNAPSHOT
Hayden TAYLOR Ike   9/29/2017   Anticoagulation Therapy Visit    Description:  32 year old male   Provider:  Ana Wong, RN   Department:  [facility]           INR as of 9/29/2017     Today's INR 2.20      Anticoagulation Summary as of 9/29/2017     INR goal 2.0-3.0   Today's INR 2.20   Full instructions 10 mg on Thu; 7.5 mg all other days   Next INR check 10/13/2017    Indications   Pulmonary embolism with infarction (H) [I26.99] [I26.99]  Long-term (current) use of anticoagulants [Z79.01] [Z79.01]         September 2017 Details    Sun Mon Tue Wed Thu Fri Sat          1               2                 3               4               5               6               7               8               9                 10               11               12               13               14               15               16                 17               18               19               20               21               22               23                 24               25               26               27               28               29      7.5 mg   See details      30      7.5 mg          Date Details   09/29 This INR check               How to take your warfarin dose     To take:  7.5 mg Take 1.5 of the 5 mg tablets.           October 2017 Details    Sun Mon Tue Wed Thu Fri Sat     1      7.5 mg         2      7.5 mg         3      7.5 mg         4      7.5 mg         5      10 mg         6      7.5 mg         7      7.5 mg           8      7.5 mg         9      7.5 mg         10      7.5 mg         11      7.5 mg         12      10 mg         13            14                 15               16               17               18               19               20               21                 22               23               24               25               26               27               28                 29               30               31                    Date Details   No additional  details    Date of next INR:  10/13/2017         How to take your warfarin dose     To take:  7.5 mg Take 1.5 of the 5 mg tablets.    To take:  10 mg Take 2 of the 5 mg tablets.

## 2017-09-29 NOTE — PROGRESS NOTES
ANTICOAGULATION FOLLOW-UP CLINIC VISIT    Patient Name:  Hayden Ramires  Date:  9/29/2017  Contact Type:  Telephone    SUBJECTIVE:     Patient Findings     Positives No Problem Findings           OBJECTIVE    INR   Date Value Ref Range Status   09/29/2017 2.20  Final     Comment:     Home Monitoring Machine        ASSESSMENT / PLAN  INR assessment THER    Recheck INR In: 2 WEEKS    INR Location Home INR      Anticoagulation Summary as of 9/29/2017     INR goal 2.0-3.0   Today's INR 2.20   Maintenance plan 10 mg (5 mg x 2) on Thu; 7.5 mg (5 mg x 1.5) all other days   Full instructions 10 mg on Thu; 7.5 mg all other days   Weekly total 55 mg   Plan last modified Adam Lo, [facility] (9/15/2016)   Next INR check 10/13/2017   Priority INR   Target end date Indefinite    Indications   Pulmonary embolism with infarction (H) [I26.99] [I26.99]  Long-term (current) use of anticoagulants [Z79.01] [Z79.01]         Anticoagulation Episode Summary     INR check location     Preferred lab     Send INR reminders to [facility]    Comments Emergency Contact Janae Nguyen   [phone]  HIPPA INFO OK to speak with Mother Janae Mittal [phone]  May leave messages on cell phone  pt prefers to be closer to 3 than 2  Contact Ph [phone]      Anticoagulation Care Providers     Provider Role Specialty Phone number    Sinan Lagunas MD Responsible Hematology [phone]            See the Encounter Report to view Anticoagulation Flowsheet and Dosing Calendar (Go to Encounters tab in chart review, and find the Anticoagulation Therapy Visit)    Spoke with patient. Gave them their lab results and new warfarin recommendation.  No changes in health, medication, or diet. No missed doses, no falls. No signs or symptoms of bleed or clotting.     Ana Wong, RN

## 2017-10-19 ENCOUNTER — ANTICOAGULATION THERAPY VISIT (OUTPATIENT)
Dept: ANTICOAGULATION | Facility: CLINIC | Age: 32
End: 2017-10-19

## 2017-10-19 DIAGNOSIS — Z79.01 LONG-TERM (CURRENT) USE OF ANTICOAGULANTS: ICD-10-CM

## 2017-10-19 DIAGNOSIS — I26.99 PULMONARY EMBOLISM WITH INFARCTION (H): ICD-10-CM

## 2017-10-19 LAB — INR PPP: 2.5

## 2017-10-19 NOTE — MR AVS SNAPSHOT
Hayden TAYLOR Ike   10/19/2017   Anticoagulation Therapy Visit    Description:  32 year old male   Provider:  Soledad Resendiz, RN   Department:  [facility]           INR as of 10/19/2017     Today's INR 2.5      Anticoagulation Summary as of 10/19/2017     INR goal 2.0-3.0   Today's INR 2.5   Full instructions 10 mg on Thu; 7.5 mg all other days   Next INR check 11/2/2017    Indications   Pulmonary embolism with infarction (H) [I26.99] [I26.99]  Long-term (current) use of anticoagulants [Z79.01] [Z79.01]         October 2017 Details    Sun Mon Tue Wed Thu Fri Sat     1               2               3               4               5               6               7                 8               9               10               11               12               13               14                 15               16               17               18               19      10 mg   See details      20      7.5 mg         21      7.5 mg           22      7.5 mg         23      7.5 mg         24      7.5 mg         25      7.5 mg         26      10 mg         27      7.5 mg         28      7.5 mg           29      7.5 mg         30      7.5 mg         31      7.5 mg              Date Details   10/19 This INR check               How to take your warfarin dose     To take:  7.5 mg Take 1.5 of the 5 mg tablets.    To take:  10 mg Take 2 of the 5 mg tablets.           November 2017 Details    Sun Mon Tue Wed Thu Fri Sat        1      7.5 mg         2            3               4                 5               6               7               8               9               10               11                 12               13               14               15               16               17               18                 19               20               21               22               23               24               25                 26               27               28               29                30                  Date Details   No additional details    Date of next INR:  11/2/2017         How to take your warfarin dose     To take:  7.5 mg Take 1.5 of the 5 mg tablets.    To take:  10 mg Take 2 of the 5 mg tablets.

## 2017-10-19 NOTE — PROGRESS NOTES
ANTICOAGULATION FOLLOW-UP CLINIC VISIT    Patient Name:  Hayden Ramires  Date:  10/19/2017  Contact Type:  Telephone    SUBJECTIVE:        OBJECTIVE    INR   Date Value Ref Range Status   10/19/2017 2.5  Final       ASSESSMENT / PLAN  INR assessment THER    Recheck INR In: 2 WEEKS    INR Location Home INR      Anticoagulation Summary as of 10/19/2017     INR goal 2.0-3.0   Today's INR 2.5   Maintenance plan 10 mg (5 mg x 2) on Thu; 7.5 mg (5 mg x 1.5) all other days   Full instructions 10 mg on Thu; 7.5 mg all other days   Weekly total 55 mg   No change documented Soledad Resendiz, RN   Plan last modified Adam Lo, [facility] (9/15/2016)   Next INR check 11/2/2017   Priority INR   Target end date Indefinite    Indications   Pulmonary embolism with infarction (H) [I26.99] [I26.99]  Long-term (current) use of anticoagulants [Z79.01] [Z79.01]         Anticoagulation Episode Summary     INR check location     Preferred lab     Send INR reminders to [facility]    Comments Emergency Contact Janae Nguyen   [phone]  HIPPA INFO OK to speak with Mother Janae or Deanne Mittal [phone]  May leave messages on cell phone  pt prefers to be closer to 3 than 2  Contact Ph [phone]      Anticoagulation Care Providers     Provider Role Specialty Phone number    Sinan Lagunas MD Responsible Hematology [phone]            See the Encounter Report to view Anticoagulation Flowsheet and Dosing Calendar (Go to Encounters tab in chart review, and find the Anticoagulation Therapy Visit)    Left message for patient with results and dosing recommendations. Asked patient to call back to report any missed doses, falls, signs and symptoms of bleeding or clotting, any changes in health, medication, or diet. Asked patient to call back with any questions or concerns.     Soledad Resendiz, BRAYDEN

## 2017-11-03 ENCOUNTER — ANTICOAGULATION THERAPY VISIT (OUTPATIENT)
Dept: ANTICOAGULATION | Facility: CLINIC | Age: 32
End: 2017-11-03

## 2017-11-03 DIAGNOSIS — I26.99 PULMONARY EMBOLISM WITH INFARCTION (H): ICD-10-CM

## 2017-11-03 DIAGNOSIS — Z79.01 LONG-TERM (CURRENT) USE OF ANTICOAGULANTS: ICD-10-CM

## 2017-11-03 LAB — INR POINT OF CARE: 2.1 (ref 0.86–1.14)

## 2017-11-03 NOTE — PROGRESS NOTES
ANTICOAGULATION FOLLOW-UP CLINIC VISIT    Patient Name:  Hayden Ramires  Date:  11/3/2017  Contact Type:  Telephone    SUBJECTIVE:     Patient Findings     Positives No Problem Findings           OBJECTIVE    INR Protime   Date Value Ref Range Status   11/03/2017 2.1 (A) 0.86 - 1.14 Final       ASSESSMENT / PLAN  INR assessment THER    Recheck INR In: 2 WEEKS    INR Location Home INR      Anticoagulation Summary as of 11/3/2017     INR goal 2.0-3.0   Today's INR 2.1   Maintenance plan 10 mg (5 mg x 2) on Thu; 7.5 mg (5 mg x 1.5) all other days   Full instructions 10 mg on Thu; 7.5 mg all other days   Weekly total 55 mg   No change documented Sol Sotomayor, BRAYDEN   Plan last modified Adam Lo, [facility] (9/15/2016)   Next INR check 11/17/2017   Priority INR   Target end date Indefinite    Indications   Pulmonary embolism with infarction (H) [I26.99] [I26.99]  Long-term (current) use of anticoagulants [Z79.01] [Z79.01]         Anticoagulation Episode Summary     INR check location     Preferred lab     Send INR reminders to [facility]    Comments Emergency Contact Janae Nguyen   [phone]  HIPPA INFO OK to speak with Mother Janae Mittal [phone]  May leave messages on cell phone  pt prefers to be closer to 3 than 2  Contact Ph [phone]      Anticoagulation Care Providers     Provider Role Specialty Phone number    Sinan Lagunas MD Responsible Hematology [phone]            See the Encounter Report to view Anticoagulation Flowsheet and Dosing Calendar (Go to Encounters tab in chart review, and find the Anticoagulation Therapy Visit)    Spoke with patient. Gave them their lab results and new warfarin recommendation.  No changes in health, medication, or diet. No missed doses, no falls. No signs or symptoms of bleed or clotting.      Sol Sotomayor RN

## 2017-11-03 NOTE — MR AVS SNAPSHOT
Hayden TAYLOR Ike   11/3/2017   Anticoagulation Therapy Visit    Description:  32 year old male   Provider:  Sol Sotomayor, RN   Department:  [facility]           INR as of 11/3/2017     Today's INR 2.1      Anticoagulation Summary as of 11/3/2017     INR goal 2.0-3.0   Today's INR 2.1   Full instructions 10 mg on Thu; 7.5 mg all other days   Next INR check 11/17/2017    Indications   Pulmonary embolism with infarction (H) [I26.99] [I26.99]  Long-term (current) use of anticoagulants [Z79.01] [Z79.01]         November 2017 Details    Sun Mon Tue Wed Thu Fri Sat        1               2               3      7.5 mg   See details      4      7.5 mg           5      7.5 mg         6      7.5 mg         7      7.5 mg         8      7.5 mg         9      10 mg         10      7.5 mg         11      7.5 mg           12      7.5 mg         13      7.5 mg         14      7.5 mg         15      7.5 mg         16      10 mg         17            18                 19               20               21               22               23               24               25                 26               27               28               29               30                  Date Details   11/03 This INR check       Date of next INR:  11/17/2017         How to take your warfarin dose     To take:  7.5 mg Take 1.5 of the 5 mg tablets.    To take:  10 mg Take 2 of the 5 mg tablets.

## 2017-11-24 ENCOUNTER — ANTICOAGULATION THERAPY VISIT (OUTPATIENT)
Dept: ANTICOAGULATION | Facility: CLINIC | Age: 32
End: 2017-11-24

## 2017-11-24 DIAGNOSIS — Z79.01 LONG-TERM (CURRENT) USE OF ANTICOAGULANTS: ICD-10-CM

## 2017-11-24 DIAGNOSIS — I26.99 PULMONARY EMBOLISM WITH INFARCTION (H): ICD-10-CM

## 2017-11-24 LAB — INR PPP: 2.4

## 2017-11-24 NOTE — MR AVS SNAPSHOT
Hayden TAYLOR Ike   11/24/2017   Anticoagulation Therapy Visit    Description:  32 year old male   Provider:  Ana Wong, RN   Department:  [facility]           INR as of 11/24/2017     Today's INR 2.40      Anticoagulation Summary as of 11/24/2017     INR goal 2.0-3.0   Today's INR 2.40   Full instructions 10 mg on Thu; 7.5 mg all other days   Next INR check 12/8/2017    Indications   Pulmonary embolism with infarction (H) [I26.99] [I26.99]  Long-term (current) use of anticoagulants [Z79.01] [Z79.01]         November 2017 Details    Sun Mon Tue Wed Thu Fri Sat        1               2               3               4                 5               6               7               8               9               10               11                 12               13               14               15               16               17               18                 19               20               21               22               23               24      7.5 mg   See details      25      7.5 mg           26      7.5 mg         27      7.5 mg         28      7.5 mg         29      7.5 mg         30      10 mg            Date Details   11/24 This INR check               How to take your warfarin dose     To take:  7.5 mg Take 1.5 of the 5 mg tablets.    To take:  10 mg Take 2 of the 5 mg tablets.           December 2017 Details    Sun Mon Tue Wed Thu Fri Sat          1      7.5 mg         2      7.5 mg           3      7.5 mg         4      7.5 mg         5      7.5 mg         6      7.5 mg         7      10 mg         8            9                 10               11               12               13               14               15               16                 17               18               19               20               21               22               23                 24               25               26               27               28               29               30                  31                      Date Details   No additional details    Date of next INR:  12/8/2017         How to take your warfarin dose     To take:  7.5 mg Take 1.5 of the 5 mg tablets.    To take:  10 mg Take 2 of the 5 mg tablets.

## 2017-11-24 NOTE — PROGRESS NOTES
ANTICOAGULATION FOLLOW-UP CLINIC VISIT    Patient Name:  Hayden Ramires  Date:  11/24/2017  Contact Type:  Telephone    SUBJECTIVE:     Patient Findings     Positives No Problem Findings           OBJECTIVE    INR   Date Value Ref Range Status   11/24/2017 2.40  Final     Comment:     Home Monitoring Machine        ASSESSMENT / PLAN  INR assessment THER    Recheck INR In: 2 WEEKS    INR Location Home INR      Anticoagulation Summary as of 11/24/2017     INR goal 2.0-3.0   Today's INR 2.40   Maintenance plan 10 mg (5 mg x 2) on Thu; 7.5 mg (5 mg x 1.5) all other days   Full instructions 10 mg on Thu; 7.5 mg all other days   Weekly total 55 mg   Plan last modified Adam Lo, [facility] (9/15/2016)   Next INR check 12/8/2017   Priority INR   Target end date Indefinite    Indications   Pulmonary embolism with infarction (H) [I26.99] [I26.99]  Long-term (current) use of anticoagulants [Z79.01] [Z79.01]         Anticoagulation Episode Summary     INR check location     Preferred lab     Send INR reminders to [facility]    Comments Emergency Contact Janae Nguyen   [phone]  HIPPA INFO OK to speak with Mother Janae Mittal [phone]  May leave messages on cell phone  pt prefers to be closer to 3 than 2  Contact Ph [phone]      Anticoagulation Care Providers     Provider Role Specialty Phone number    Sinan Lagunas MD Responsible Hematology [phone]            See the Encounter Report to view Anticoagulation Flowsheet and Dosing Calendar (Go to Encounters tab in chart review, and find the Anticoagulation Therapy Visit)    Spoke with patient. Gave them their lab results and new warfarin recommendation.  No changes in health, medication, or diet. No missed doses, no falls. No signs or symptoms of bleed or clotting.     Ana Wong, RN

## 2017-12-12 ENCOUNTER — ANTICOAGULATION THERAPY VISIT (OUTPATIENT)
Dept: ANTICOAGULATION | Facility: CLINIC | Age: 32
End: 2017-12-12

## 2017-12-12 DIAGNOSIS — Z79.01 LONG-TERM (CURRENT) USE OF ANTICOAGULANTS: ICD-10-CM

## 2017-12-12 DIAGNOSIS — I26.99 PULMONARY EMBOLISM WITH INFARCTION (H): ICD-10-CM

## 2017-12-12 LAB — INR PPP: 2

## 2017-12-12 NOTE — MR AVS SNAPSHOT
Hayden TAYLOR Ike   12/12/2017   Anticoagulation Therapy Visit    Description:  32 year old male   Provider:  Ana Wong, RN   Department:  [facility]           INR as of 12/12/2017     Today's INR 2.00      Anticoagulation Summary as of 12/12/2017     INR goal 2.0-3.0   Today's INR 2.00   Full instructions 10 mg on Thu; 7.5 mg all other days   Next INR check 12/26/2017    Indications   Pulmonary embolism with infarction (H) [I26.99] [I26.99]  Long-term (current) use of anticoagulants [Z79.01] [Z79.01]         December 2017 Details    Sun Mon Tue Wed Thu Fri Sat          1               2                 3               4               5               6               7               8               9                 10               11               12      7.5 mg   See details      13      7.5 mg         14      10 mg         15      7.5 mg         16      7.5 mg           17      7.5 mg         18      7.5 mg         19      7.5 mg         20      7.5 mg         21      10 mg         22      7.5 mg         23      7.5 mg           24      7.5 mg         25      7.5 mg         26            27               28               29               30                 31                      Date Details   12/12 This INR check       Date of next INR:  12/26/2017         How to take your warfarin dose     To take:  7.5 mg Take 1.5 of the 5 mg tablets.    To take:  10 mg Take 2 of the 5 mg tablets.

## 2017-12-12 NOTE — PROGRESS NOTES
ANTICOAGULATION FOLLOW-UP CLINIC VISIT    Patient Name:  Hayden Ramires  Date:  12/12/2017  Contact Type:  Telephone    SUBJECTIVE:     Patient Findings     Positives No Problem Findings           OBJECTIVE    INR   Date Value Ref Range Status   12/12/2017 2.00  Final     Comment:     Home Monitoring Machine        ASSESSMENT / PLAN  INR assessment THER    Recheck INR In: 2 WEEKS    INR Location Home INR      Anticoagulation Summary as of 12/12/2017     INR goal 2.0-3.0   Today's INR 2.00   Maintenance plan 10 mg (5 mg x 2) on Thu; 7.5 mg (5 mg x 1.5) all other days   Full instructions 10 mg on Thu; 7.5 mg all other days   Weekly total 55 mg   Plan last modified Adam Lo, [facility] (9/15/2016)   Next INR check 12/26/2017   Priority INR   Target end date Indefinite    Indications   Pulmonary embolism with infarction (H) [I26.99] [I26.99]  Long-term (current) use of anticoagulants [Z79.01] [Z79.01]         Anticoagulation Episode Summary     INR check location     Preferred lab     Send INR reminders to [facility]    Comments Emergency Contact Janae Nguyen   [phone]  HIPPA INFO OK to speak with Mother Janae Mittal [phone]  May leave messages on cell phone  pt prefers to be closer to 3 than 2  Contact Ph [phone]      Anticoagulation Care Providers     Provider Role Specialty Phone number    Sinan Lagunas MD Responsible Hematology [phone]            See the Encounter Report to view Anticoagulation Flowsheet and Dosing Calendar (Go to Encounters tab in chart review, and find the Anticoagulation Therapy Visit)    Spoke with patient. Gave them their lab results and new warfarin recommendation.  No changes in health, medication, or diet. No missed doses, no falls. No signs or symptoms of bleed or clotting.     Ana Wong, RN

## 2017-12-29 ENCOUNTER — ANTICOAGULATION THERAPY VISIT (OUTPATIENT)
Dept: ANTICOAGULATION | Facility: CLINIC | Age: 32
End: 2017-12-29

## 2017-12-29 DIAGNOSIS — I26.99 PULMONARY EMBOLISM WITH INFARCTION (H): ICD-10-CM

## 2017-12-29 DIAGNOSIS — Z79.01 LONG-TERM (CURRENT) USE OF ANTICOAGULANTS: ICD-10-CM

## 2017-12-29 LAB — INR PPP: 2.7

## 2017-12-29 NOTE — MR AVS SNAPSHOT
Hayden TAYLOR Ike   12/29/2017   Anticoagulation Therapy Visit    Description:  32 year old male   Provider:  Ana Wong, RN   Department:  [facility]           INR as of 12/29/2017     Today's INR 2.70      Anticoagulation Summary as of 12/29/2017     INR goal 2.0-3.0   Today's INR 2.70   Full instructions 10 mg on Thu; 7.5 mg all other days   Next INR check 1/12/2018    Indications   Pulmonary embolism with infarction (H) [I26.99] [I26.99]  Long-term (current) use of anticoagulants [Z79.01] [Z79.01]         December 2017 Details    Sun Mon Tue Wed Thu Fri Sat          1               2                 3               4               5               6               7               8               9                 10               11               12               13               14               15               16                 17               18               19               20               21               22               23                 24               25               26               27               28               29      7.5 mg   See details      30      7.5 mg           31      7.5 mg                Date Details   12/29 This INR check               How to take your warfarin dose     To take:  7.5 mg Take 1.5 of the 5 mg tablets.           January 2018 Details    Sun Mon Tue Wed Thu Fri Sat      1      7.5 mg         2      7.5 mg         3      7.5 mg         4      10 mg         5      7.5 mg         6      7.5 mg           7      7.5 mg         8      7.5 mg         9      7.5 mg         10      7.5 mg         11      10 mg         12            13                 14               15               16               17               18               19               20                 21               22               23               24               25               26               27                 28               29               30               31                    Date Details   No additional details    Date of next INR:  1/12/2018         How to take your warfarin dose     To take:  7.5 mg Take 1.5 of the 5 mg tablets.    To take:  10 mg Take 2 of the 5 mg tablets.

## 2017-12-29 NOTE — PROGRESS NOTES
ANTICOAGULATION FOLLOW-UP CLINIC VISIT    Patient Name:  Hayden Ramires  Date:  12/29/2017  Contact Type:  Telephone    SUBJECTIVE:     Patient Findings     Positives No Problem Findings           OBJECTIVE    INR   Date Value Ref Range Status   12/29/2017 2.70  Final     Comment:     Home Monitoring Machine        ASSESSMENT / PLAN  INR assessment THER    Recheck INR In: 2 WEEKS    INR Location Home INR      Anticoagulation Summary as of 12/29/2017     INR goal 2.0-3.0   Today's INR 2.70   Maintenance plan 10 mg (5 mg x 2) on Thu; 7.5 mg (5 mg x 1.5) all other days   Full instructions 10 mg on Thu; 7.5 mg all other days   Weekly total 55 mg   Plan last modified Adam Lo, [facility] (9/15/2016)   Next INR check 1/12/2018   Priority INR   Target end date Indefinite    Indications   Pulmonary embolism with infarction (H) [I26.99] [I26.99]  Long-term (current) use of anticoagulants [Z79.01] [Z79.01]         Anticoagulation Episode Summary     INR check location     Preferred lab     Send INR reminders to [facility]    Comments Emergency Contact Janae Nguyen   [phone]  HIPPA INFO OK to speak with Mother Janae iMttal [phone]  May leave messages on cell phone  pt prefers to be closer to 3 than 2  Contact Ph [phone]      Anticoagulation Care Providers     Provider Role Specialty Phone number    Sinan Lagunas MD Responsible Hematology [phone]            See the Encounter Report to view Anticoagulation Flowsheet and Dosing Calendar (Go to Encounters tab in chart review, and find the Anticoagulation Therapy Visit)    Spoke with patient. Gave them their lab results and new warfarin recommendation.  No changes in health, medication, or diet. No missed doses, no falls. No signs or symptoms of bleed or clotting.    Aan Wong, RN

## 2018-01-17 ENCOUNTER — ANTICOAGULATION THERAPY VISIT (OUTPATIENT)
Dept: ANTICOAGULATION | Facility: CLINIC | Age: 33
End: 2018-01-17

## 2018-01-17 DIAGNOSIS — Z79.01 LONG-TERM (CURRENT) USE OF ANTICOAGULANTS: ICD-10-CM

## 2018-01-17 DIAGNOSIS — I26.99 PULMONARY EMBOLISM WITH INFARCTION (H): ICD-10-CM

## 2018-01-17 LAB — INR PPP: 2.3

## 2018-01-17 NOTE — MR AVS SNAPSHOT
Hayden TAYLOR Ike   1/17/2018   Anticoagulation Therapy Visit    Description:  32 year old male   Provider:  Ana Wong, RN   Department:  [facility]           INR as of 1/17/2018     Today's INR 2.30      Anticoagulation Summary as of 1/17/2018     INR goal 2.0-3.0   Today's INR 2.30   Full instructions 10 mg on Thu; 7.5 mg all other days   Next INR check 1/31/2018    Indications   Pulmonary embolism with infarction (H) [I26.99] [I26.99]  Long-term (current) use of anticoagulants [Z79.01] [Z79.01]         January 2018 Details    Sun Mon Tue Wed Thu Fri Sat      1               2               3               4               5               6                 7               8               9               10               11               12               13                 14               15               16               17      7.5 mg   See details      18      10 mg         19      7.5 mg         20      7.5 mg           21      7.5 mg         22      7.5 mg         23      7.5 mg         24      7.5 mg         25      10 mg         26      7.5 mg         27      7.5 mg           28      7.5 mg         29      7.5 mg         30      7.5 mg         31                Date Details   01/17 This INR check       Date of next INR:  1/31/2018         How to take your warfarin dose     To take:  7.5 mg Take 1.5 of the 5 mg tablets.    To take:  10 mg Take 2 of the 5 mg tablets.

## 2018-01-17 NOTE — PROGRESS NOTES
ANTICOAGULATION FOLLOW-UP CLINIC VISIT    Patient Name:  Hayden Ramires  Date:  1/17/2018  Contact Type:  Telephone    SUBJECTIVE:     Patient Findings     Positives No Problem Findings           OBJECTIVE    INR   Date Value Ref Range Status   01/17/2018 2.30  Final     Comment:     Home Monitoring Machine        ASSESSMENT / PLAN  INR assessment THER    Recheck INR In: 2 WEEKS    INR Location Home INR      Anticoagulation Summary as of 1/17/2018     INR goal 2.0-3.0   Today's INR 2.30   Maintenance plan 10 mg (5 mg x 2) on Thu; 7.5 mg (5 mg x 1.5) all other days   Full instructions 10 mg on Thu; 7.5 mg all other days   Weekly total 55 mg   Plan last modified Adam Lo, [facility] (9/15/2016)   Next INR check 1/31/2018   Priority INR   Target end date Indefinite    Indications   Pulmonary embolism with infarction (H) [I26.99] [I26.99]  Long-term (current) use of anticoagulants [Z79.01] [Z79.01]         Anticoagulation Episode Summary     INR check location     Preferred lab     Send INR reminders to [facility]    Comments Emergency Contact Janae Nguyen   [phone]  HIPPA INFO OK to speak with Mother Janae Mittal [phone]  May leave messages on cell phone  pt prefers to be closer to 3 than 2  Contact Ph [phone]      Anticoagulation Care Providers     Provider Role Specialty Phone number    Sinan Lagunas MD Responsible Hematology [phone]            See the Encounter Report to view Anticoagulation Flowsheet and Dosing Calendar (Go to Encounters tab in chart review, and find the Anticoagulation Therapy Visit)    Spoke with patient. Gave them their lab results and new warfarin recommendation.  No changes in health, medication, or diet. No missed doses, no falls. No signs or symptoms of bleed or clotting.     Ana Wong, RN

## 2018-02-02 ENCOUNTER — ANTICOAGULATION THERAPY VISIT (OUTPATIENT)
Dept: ANTICOAGULATION | Facility: CLINIC | Age: 33
End: 2018-02-02

## 2018-02-02 DIAGNOSIS — I26.99 PULMONARY EMBOLISM WITH INFARCTION (H): ICD-10-CM

## 2018-02-02 DIAGNOSIS — Z79.01 LONG-TERM (CURRENT) USE OF ANTICOAGULANTS: ICD-10-CM

## 2018-02-02 LAB — INR PPP: 2.4

## 2018-02-02 NOTE — MR AVS SNAPSHOT
Hayden BRANDON Ramires   2/2/2018   Anticoagulation Therapy Visit    Description:  32 year old male   Provider:  Adam Lo, [facility]   Department:  [facility]           INR as of 2/2/2018     Today's INR 2.4      Anticoagulation Summary as of 2/2/2018     INR goal 2.0-3.0   Today's INR 2.4   Full instructions 10 mg on Thu; 7.5 mg all other days   Next INR check 2/16/2018    Indications   Pulmonary embolism with infarction (H) [I26.99] [I26.99]  Long-term (current) use of anticoagulants [Z79.01] [Z79.01]         February 2018 Details    Sun Mon Tue Wed Thu Fri Sat         1               2      7.5 mg   See details      3      7.5 mg           4      7.5 mg         5      7.5 mg         6      7.5 mg         7      7.5 mg         8      10 mg         9      7.5 mg         10      7.5 mg           11      7.5 mg         12      7.5 mg         13      7.5 mg         14      7.5 mg         15      10 mg         16            17                 18               19               20               21               22               23               24                 25               26               27               28                   Date Details   02/02 This INR check       Date of next INR:  2/16/2018         How to take your warfarin dose     To take:  7.5 mg Take 1.5 of the 5 mg tablets.    To take:  10 mg Take 2 of the 5 mg tablets.

## 2018-02-02 NOTE — PROGRESS NOTES
ANTICOAGULATION FOLLOW-UP CLINIC VISIT    Patient Name:  Hayden Ramires  Date:  2/2/2018  Contact Type:  Telephone    SUBJECTIVE:     Patient Findings     Positives No Problem Findings           OBJECTIVE    INR   Date Value Ref Range Status   02/02/2018 2.4  Final       ASSESSMENT / PLAN  INR assessment THER    Recheck INR In: 2 WEEKS    INR Location Home INR      Anticoagulation Summary as of 2/2/2018     INR goal 2.0-3.0   Today's INR 2.4   Maintenance plan 10 mg (5 mg x 2) on Thu; 7.5 mg (5 mg x 1.5) all other days   Full instructions 10 mg on Thu; 7.5 mg all other days   Weekly total 55 mg   Plan last modified Adam Lo RP (9/15/2016)   Next INR check 2/16/2018   Priority INR   Target end date Indefinite    Indications   Pulmonary embolism with infarction (H) [I26.99] [I26.99]  Long-term (current) use of anticoagulants [Z79.01] [Z79.01]         Anticoagulation Episode Summary     INR check location     Preferred lab     Send INR reminders to [facility]    Comments Emergency Contact Janae Nguyen   [phone]  HIPPA INFO OK to speak with Mother Janae Mittal [phone]  May leave messages on cell phone  pt prefers to be closer to 3 than 2  Contact Ph [phone]      Anticoagulation Care Providers     Provider Role Specialty Phone number    Sinan Lagunas MD Responsible Hematology [phone]            See the Encounter Report to view Anticoagulation Flowsheet and Dosing Calendar (Go to Encounters tab in chart review, and find the Anticoagulation Therapy Visit)    Spoke with patient. Gave them their lab results and new warfarin recommendation.  No changes in health, medication, or diet. No missed doses, no falls. No signs or symptoms of bleed or clotting.      Adam Lo RP

## 2018-02-19 ENCOUNTER — ANTICOAGULATION THERAPY VISIT (OUTPATIENT)
Dept: ANTICOAGULATION | Facility: CLINIC | Age: 33
End: 2018-02-19

## 2018-02-19 DIAGNOSIS — I26.99 PULMONARY EMBOLISM WITH INFARCTION (H): ICD-10-CM

## 2018-02-19 DIAGNOSIS — Z79.01 LONG-TERM (CURRENT) USE OF ANTICOAGULANTS: ICD-10-CM

## 2018-02-19 LAB — INR PPP: 2.1

## 2018-02-19 NOTE — MR AVS SNAPSHOT
Hayden TAYLOR Ike   2/19/2018   Anticoagulation Therapy Visit    Description:  32 year old male   Provider:  Bita Beach RN   Department:  [facility]           INR as of 2/19/2018     Today's INR 2.1      Anticoagulation Summary as of 2/19/2018     INR goal 2.0-3.0   Today's INR 2.1   Full instructions 10 mg on Thu; 7.5 mg all other days   Next INR check 3/5/2018    Indications   Pulmonary embolism with infarction (H) [I26.99] [I26.99]  Long-term (current) use of anticoagulants [Z79.01] [Z79.01]         February 2018 Details    Sun Mon Tue Wed Thu Fri Sat         1               2               3                 4               5               6               7               8               9               10                 11               12               13               14               15               16               17                 18               19      7.5 mg   See details      20      7.5 mg         21      7.5 mg         22      10 mg         23      7.5 mg         24      7.5 mg           25      7.5 mg         26      7.5 mg         27      7.5 mg         28      7.5 mg             Date Details   02/19 This INR check               How to take your warfarin dose     To take:  7.5 mg Take 1.5 of the 5 mg tablets.    To take:  10 mg Take 2 of the 5 mg tablets.           March 2018 Details    Sun Mon Tue Wed Thu Fri Sat         1      10 mg         2      7.5 mg         3      7.5 mg           4      7.5 mg         5            6               7               8               9               10                 11               12               13               14               15               16               17                 18               19               20               21               22               23               24                 25               26               27               28               29               30               31                Date Details    No additional details    Date of next INR:  3/5/2018         How to take your warfarin dose     To take:  7.5 mg Take 1.5 of the 5 mg tablets.    To take:  10 mg Take 2 of the 5 mg tablets.

## 2018-02-19 NOTE — PROGRESS NOTES
ANTICOAGULATION FOLLOW-UP CLINIC VISIT    Patient Name:  Hayden Ramires  Date:  2/19/2018  Contact Type:  Telephone    SUBJECTIVE:     Patient Findings     Positives No Problem Findings           OBJECTIVE    INR   Date Value Ref Range Status   02/19/2018 2.1  Final       ASSESSMENT / PLAN  INR assessment THER    Recheck INR In: 2 WEEKS    INR Location Home INR      Anticoagulation Summary as of 2/19/2018     INR goal 2.0-3.0   Today's INR 2.1   Maintenance plan 10 mg (5 mg x 2) on Thu; 7.5 mg (5 mg x 1.5) all other days   Full instructions 10 mg on Thu; 7.5 mg all other days   Weekly total 55 mg   Plan last modified Adam Lo, [facility] (9/15/2016)   Next INR check 3/5/2018   Priority INR   Target end date Indefinite    Indications   Pulmonary embolism with infarction (H) [I26.99] [I26.99]  Long-term (current) use of anticoagulants [Z79.01] [Z79.01]         Anticoagulation Episode Summary     INR check location     Preferred lab     Send INR reminders to [facility]    Comments Emergency Contact Janae Nguyen   [phone]  HIPPA INFO OK to speak with Mother Janae Mittal [phone]  May leave messages on cell phone  pt prefers to be closer to 3 than 2  Contact Ph [phone]      Anticoagulation Care Providers     Provider Role Specialty Phone number    Sinan Lagunas MD Responsible Hematology [phone]            See the Encounter Report to view Anticoagulation Flowsheet and Dosing Calendar (Go to Encounters tab in chart review, and find the Anticoagulation Therapy Visit)  Spoke with patient.    Bita Beach RN

## 2018-03-05 ENCOUNTER — ANTICOAGULATION THERAPY VISIT (OUTPATIENT)
Dept: ANTICOAGULATION | Facility: CLINIC | Age: 33
End: 2018-03-05

## 2018-03-05 DIAGNOSIS — Z79.01 LONG-TERM (CURRENT) USE OF ANTICOAGULANTS: ICD-10-CM

## 2018-03-05 DIAGNOSIS — I26.99 PULMONARY EMBOLISM WITH INFARCTION (H): ICD-10-CM

## 2018-03-05 LAB — INR PPP: 2.2

## 2018-03-05 NOTE — MR AVS SNAPSHOT
Hayden TAYLOR Iek   3/5/2018   Anticoagulation Therapy Visit    Description:  32 year old male   Provider:  Ana Wong, RN   Department:  [facility]           INR as of 3/5/2018     Today's INR 2.20      Anticoagulation Summary as of 3/5/2018     INR goal 2.0-3.0   Today's INR 2.20   Full instructions 10 mg on Thu; 7.5 mg all other days   Next INR check 3/19/2018    Indications   Pulmonary embolism with infarction (H) [I26.99] [I26.99]  Long-term (current) use of anticoagulants [Z79.01] [Z79.01]         March 2018 Details    Sun Mon Tue Wed Thu Fri Sat         1               2               3                 4               5      7.5 mg   See details      6      7.5 mg         7      7.5 mg         8      10 mg         9      7.5 mg         10      7.5 mg           11      7.5 mg         12      7.5 mg         13      7.5 mg         14      7.5 mg         15      10 mg         16      7.5 mg         17      7.5 mg           18      7.5 mg         19            20               21               22               23               24                 25               26               27               28               29               30               31                Date Details   03/05 This INR check       Date of next INR:  3/19/2018         How to take your warfarin dose     To take:  7.5 mg Take 1.5 of the 5 mg tablets.    To take:  10 mg Take 2 of the 5 mg tablets.

## 2018-03-05 NOTE — PROGRESS NOTES
ANTICOAGULATION FOLLOW-UP CLINIC VISIT    Patient Name:  Hayden Ramires  Date:  3/5/2018  Contact Type:  Telephone    SUBJECTIVE:     Patient Findings     Positives No Problem Findings           OBJECTIVE    INR   Date Value Ref Range Status   03/05/2018 2.20  Final     Comment:     Home Monitoring Machine        ASSESSMENT / PLAN  INR assessment THER    Recheck INR In: 2 WEEKS    INR Location Home INR      Anticoagulation Summary as of 3/5/2018     INR goal 2.0-3.0   Today's INR 2.20   Maintenance plan 10 mg (5 mg x 2) on Thu; 7.5 mg (5 mg x 1.5) all other days   Full instructions 10 mg on Thu; 7.5 mg all other days   Weekly total 55 mg   Plan last modified Adam Lo, [facility] (9/15/2016)   Next INR check 3/19/2018   Priority INR   Target end date Indefinite    Indications   Pulmonary embolism with infarction (H) [I26.99] [I26.99]  Long-term (current) use of anticoagulants [Z79.01] [Z79.01]         Anticoagulation Episode Summary     INR check location     Preferred lab     Send INR reminders to [facility]    Comments Emergency Contact Janae Nguyen   [phone]  HIPPA INFO OK to speak with Mother Janae Mittal [phone]  May leave messages on cell phone  pt prefers to be closer to 3 than 2  Contact Ph [phone]      Anticoagulation Care Providers     Provider Role Specialty Phone number    Sinan Lagunas MD Responsible Hematology [phone]            See the Encounter Report to view Anticoagulation Flowsheet and Dosing Calendar (Go to Encounters tab in chart review, and find the Anticoagulation Therapy Visit)    Spoke with patient. Gave them their lab results and new warfarin recommendation.  No changes in health, medication, or diet. No missed doses, no falls. No signs or symptoms of bleed or clotting.     Ana Wong, RN

## 2018-03-22 LAB — INR PPP: 2.3

## 2018-03-23 ENCOUNTER — ANTICOAGULATION THERAPY VISIT (OUTPATIENT)
Dept: ANTICOAGULATION | Facility: CLINIC | Age: 33
End: 2018-03-23

## 2018-03-23 DIAGNOSIS — Z79.01 LONG-TERM (CURRENT) USE OF ANTICOAGULANTS: ICD-10-CM

## 2018-03-23 DIAGNOSIS — I26.99 PULMONARY EMBOLISM WITH INFARCTION (H): ICD-10-CM

## 2018-03-23 NOTE — PROGRESS NOTES
ANTICOAGULATION FOLLOW-UP CLINIC VISIT    Patient Name:  Hayden Ramires  Date:  3/23/2018  Contact Type:  Telephone    SUBJECTIVE:        OBJECTIVE    INR   Date Value Ref Range Status   03/22/2018 2.30  Corrected     Comment:     Home Monitoring Machine        ASSESSMENT / PLAN  INR assessment THER    Recheck INR In: 2 WEEKS    INR Location Home INR      Anticoagulation Summary as of 3/23/2018     INR goal 2.0-3.0   Today's INR    Maintenance plan 10 mg (5 mg x 2) on Thu; 7.5 mg (5 mg x 1.5) all other days   Full instructions 10 mg on Thu; 7.5 mg all other days   Weekly total 55 mg   Plan last modified Adam Lo, [facility] (9/15/2016)   Next INR check 4/5/2018   Priority INR   Target end date Indefinite    Indications   Pulmonary embolism with infarction (H) [I26.99] [I26.99]  Long-term (current) use of anticoagulants [Z79.01] [Z79.01]         Anticoagulation Episode Summary     INR check location     Preferred lab     Send INR reminders to [facility]    Comments Emergency Contact Janae Nguyen   [phone]  HIPPA INFO OK to speak with Mother Janae Mittal [phone]  May leave messages on cell phone  pt prefers to be closer to 3 than 2  Contact Ph [phone]      Anticoagulation Care Providers     Provider Role Specialty Phone number    Sinan Lagunas MD Responsible Hematology [phone]            See the Encounter Report to view Anticoagulation Flowsheet and Dosing Calendar (Go to Encounters tab in chart review, and find the Anticoagulation Therapy Visit)    Left message for patient with results and dosing recommendations. Asked patient to call back to report any missed doses, falls, signs and symptoms of bleeding or clotting, any changes in health, medication, or diet. Asked patient to call back with any questions or concerns.     Ana Wong RN

## 2018-03-23 NOTE — MR AVS SNAPSHOT
Hayden TAYLOR Ike   3/23/2018   Anticoagulation Therapy Visit    Description:  32 year old male   Provider:  Ana Wong, RN   Department:  [facility]           INR as of 3/23/2018     Today's INR       Anticoagulation Summary as of 3/23/2018     INR goal 2.0-3.0   Today's INR    Full instructions 10 mg on Thu; 7.5 mg all other days   Next INR check 4/5/2018    Indications   Pulmonary embolism with infarction (H) [I26.99] [I26.99]  Long-term (current) use of anticoagulants [Z79.01] [Z79.01]         March 2018 Details    Sun Mon Tue Wed Thu Fri Sat         1               2               3                 4               5               6               7               8               9               10                 11               12               13               14               15               16               17                 18               19               20               21               22               23      7.5 mg   See details      24      7.5 mg           25      7.5 mg         26      7.5 mg         27      7.5 mg         28      7.5 mg         29      10 mg         30      7.5 mg         31      7.5 mg          Date Details   03/23 This INR check               How to take your warfarin dose     To take:  7.5 mg Take 1.5 of the 5 mg tablets.    To take:  10 mg Take 2 of the 5 mg tablets.           April 2018 Details    Sun Mon Tue Wed Thu Fri Sat     1      7.5 mg         2      7.5 mg         3      7.5 mg         4      7.5 mg         5            6               7                 8               9               10               11               12               13               14                 15               16               17               18               19               20               21                 22               23               24               25               26               27               28                 29               30                      Date Details   No additional details    Date of next INR:  4/5/2018         How to take your warfarin dose     To take:  7.5 mg Take 1.5 of the 5 mg tablets.    To take:  10 mg Take 2 of the 5 mg tablets.

## 2018-04-09 LAB — INR PPP: 2.1

## 2018-04-10 ENCOUNTER — ANTICOAGULATION THERAPY VISIT (OUTPATIENT)
Dept: ANTICOAGULATION | Facility: CLINIC | Age: 33
End: 2018-04-10

## 2018-04-10 DIAGNOSIS — I26.99 PULMONARY EMBOLISM WITH INFARCTION (H): ICD-10-CM

## 2018-04-10 DIAGNOSIS — Z79.01 LONG-TERM (CURRENT) USE OF ANTICOAGULANTS: ICD-10-CM

## 2018-04-10 NOTE — PROGRESS NOTES
ANTICOAGULATION FOLLOW-UP CLINIC VISIT    Patient Name:  Hayden Ramires  Date:  4/10/2018  Contact Type:  Telephone    SUBJECTIVE:     Patient Findings     Positives No Problem Findings           OBJECTIVE    INR   Date Value Ref Range Status   04/09/2018 2.10  Final     Comment:     Home Monitoring Machine        ASSESSMENT / PLAN  INR assessment THER    Recheck INR In: 2 WEEKS    INR Location Home INR      Anticoagulation Summary as of 4/10/2018     INR goal 2.0-3.0   Today's INR 2.10 (4/9/2018)   Maintenance plan 10 mg (5 mg x 2) on Thu; 7.5 mg (5 mg x 1.5) all other days   Full instructions 10 mg on Thu; 7.5 mg all other days   Weekly total 55 mg   Plan last modified Adam Lo, [facility] (9/15/2016)   Next INR check 4/23/2018   Priority INR   Target end date Indefinite    Indications   Pulmonary embolism with infarction (H) [I26.99] [I26.99]  Long-term (current) use of anticoagulants [Z79.01] [Z79.01]         Anticoagulation Episode Summary     INR check location     Preferred lab     Send INR reminders to [facility]    Comments Emergency Contact Janae Nguyen   [phone]  HIPPA INFO OK to speak with Mother Janae Mittal [phone]  May leave messages on cell phone  pt prefers to be closer to 3 than 2  Contact Ph [phone]      Anticoagulation Care Providers     Provider Role Specialty Phone number    Sinan Lagunas MD Responsible Hematology [phone]            See the Encounter Report to view Anticoagulation Flowsheet and Dosing Calendar (Go to Encounters tab in chart review, and find the Anticoagulation Therapy Visit)    Spoke with patient. Gave them their lab results and new warfarin recommendation.  No changes in health, medication, or diet. No missed doses, no falls. No signs or symptoms of bleed or clotting.     Ana Wong, RN

## 2018-04-10 NOTE — MR AVS SNAPSHOT
Hayden TAYLOR Ike   4/10/2018   Anticoagulation Therapy Visit    Description:  32 year old male   Provider:  Ana Wong, RN   Department:  [facility]           INR as of 4/10/2018     Today's INR 2.10 (4/9/2018)      Anticoagulation Summary as of 4/10/2018     INR goal 2.0-3.0   Today's INR 2.10 (4/9/2018)   Full instructions 10 mg on Thu; 7.5 mg all other days   Next INR check 4/23/2018    Indications   Pulmonary embolism with infarction (H) [I26.99] [I26.99]  Long-term (current) use of anticoagulants [Z79.01] [Z79.01]         April 2018 Details    Sun Mon Tue Wed Thu Fri Sat     1               2               3               4               5               6               7                 8               9               10      7.5 mg   See details      11      7.5 mg         12      10 mg         13      7.5 mg         14      7.5 mg           15      7.5 mg         16      7.5 mg         17      7.5 mg         18      7.5 mg         19      10 mg         20      7.5 mg         21      7.5 mg           22      7.5 mg         23            24               25               26               27               28                 29               30                     Date Details   04/10 This INR check       Date of next INR:  4/23/2018         How to take your warfarin dose     To take:  7.5 mg Take 1.5 of the 5 mg tablets.    To take:  10 mg Take 2 of the 5 mg tablets.

## 2018-04-25 ENCOUNTER — ANTICOAGULATION THERAPY VISIT (OUTPATIENT)
Dept: ANTICOAGULATION | Facility: CLINIC | Age: 33
End: 2018-04-25

## 2018-04-25 DIAGNOSIS — I26.99 PULMONARY EMBOLISM WITH INFARCTION (H): ICD-10-CM

## 2018-04-25 DIAGNOSIS — Z79.01 LONG-TERM (CURRENT) USE OF ANTICOAGULANTS: ICD-10-CM

## 2018-04-25 LAB — INR POINT OF CARE: 2.7 (ref 0.86–1.14)

## 2018-04-25 NOTE — PROGRESS NOTES
ANTICOAGULATION FOLLOW-UP CLINIC VISIT    Patient Name:  Hayden Ramires  Date:  4/25/2018  Contact Type:  Telephone    SUBJECTIVE:     Patient Findings     Positives No Problem Findings           OBJECTIVE    INR Protime   Date Value Ref Range Status   04/25/2018 2.7 (A) 0.86 - 1.14 Final       ASSESSMENT / PLAN  INR assessment THER    Recheck INR In: 2 WEEKS    INR Location Home INR      Anticoagulation Summary as of 4/25/2018     INR goal 2.0-3.0   Today's INR 2.7   Maintenance plan 10 mg (5 mg x 2) on Thu; 7.5 mg (5 mg x 1.5) all other days   Full instructions 10 mg on Thu; 7.5 mg all other days   Weekly total 55 mg   No change documented oSl Sotomayor, RN   Plan last modified Adam Lo, [facility] (9/15/2016)   Next INR check 5/9/2018   Priority INR   Target end date Indefinite    Indications   Pulmonary embolism with infarction (H) [I26.99] [I26.99]  Long-term (current) use of anticoagulants [Z79.01] [Z79.01]         Anticoagulation Episode Summary     INR check location     Preferred lab     Send INR reminders to [facility]    Comments Emergency Contact Janae Nguyen   [phone]  HIPPA INFO OK to speak with Mother Janae Mittal [phone]  May leave messages on cell phone  pt prefers to be closer to 3 than 2  Contact Ph [phone]      Anticoagulation Care Providers     Provider Role Specialty Phone number    Sinan Lagunas MD Responsible Hematology [phone]            See the Encounter Report to view Anticoagulation Flowsheet and Dosing Calendar (Go to Encounters tab in chart review, and find the Anticoagulation Therapy Visit)    Spoke with patient. Gave them their lab results and new warfarin recommendation.  No changes in health, medication, or diet. No missed doses, no falls. No signs or symptoms of bleed or clotting.      Sol Sotomayor, BRAYDEN        Addendum 5/7/18    I refilled his warfarin today.  Looks like Bebo has not seen him since 12/2015, but med monitoring  has had very regular contact with him.     Carmen can you please reach out to get him scheduled with Bebo or a PA in the near future.     Med monitoring folks - can you please reinforce this need at your next contact.  He should call [phone] to schedule.     Virginia Martin, RN - Nurse Clinician - [facility] - [phone]     [facility]

## 2018-04-25 NOTE — MR AVS SNAPSHOT
Hayden TAYLOR Ike   4/25/2018   Anticoagulation Therapy Visit    Description:  32 year old male   Provider:  Sol Sotomayor, RN   Department:  [facility]           INR as of 4/25/2018     Today's INR 2.7      Anticoagulation Summary as of 4/25/2018     INR goal 2.0-3.0   Today's INR 2.7   Full instructions 10 mg on Thu; 7.5 mg all other days   Next INR check 5/9/2018    Indications   Pulmonary embolism with infarction (H) [I26.99] [I26.99]  Long-term (current) use of anticoagulants [Z79.01] [Z79.01]         April 2018 Details    Sun Mon Tue Wed Thu Fri Sat     1               2               3               4               5               6               7                 8               9               10               11               12               13               14                 15               16               17               18               19               20               21                 22               23               24               25      7.5 mg   See details      26      10 mg         27      7.5 mg         28      7.5 mg           29      7.5 mg         30      7.5 mg               Date Details   04/25 This INR check               How to take your warfarin dose     To take:  7.5 mg Take 1.5 of the 5 mg tablets.    To take:  10 mg Take 2 of the 5 mg tablets.           May 2018 Details    Sun Mon Tue Wed Thu Fri Sat       1      7.5 mg         2      7.5 mg         3      10 mg         4      7.5 mg         5      7.5 mg           6      7.5 mg         7      7.5 mg         8      7.5 mg         9            10               11               12                 13               14               15               16               17               18               19                 20               21               22               23               24               25               26                 27               28               29               30               31                   Date Details   No additional details    Date of next INR:  5/9/2018         How to take your warfarin dose     To take:  7.5 mg Take 1.5 of the 5 mg tablets.    To take:  10 mg Take 2 of the 5 mg tablets.

## 2018-05-10 ENCOUNTER — ANTICOAGULATION THERAPY VISIT (OUTPATIENT)
Dept: ANTICOAGULATION | Facility: CLINIC | Age: 33
End: 2018-05-10

## 2018-05-10 DIAGNOSIS — Z79.01 LONG-TERM (CURRENT) USE OF ANTICOAGULANTS: ICD-10-CM

## 2018-05-10 DIAGNOSIS — I26.99 PULMONARY EMBOLISM WITH INFARCTION (H): ICD-10-CM

## 2018-05-10 LAB — INR PPP: 2.1

## 2018-05-10 NOTE — PROGRESS NOTES
ANTICOAGULATION FOLLOW-UP CLINIC VISIT    Patient Name:  Hayden Ramires  Date:  5/10/2018  Contact Type:  Telephone    SUBJECTIVE:     Patient Findings     Positives No Problem Findings           OBJECTIVE    INR   Date Value Ref Range Status   05/10/2018 2.1  Final       ASSESSMENT / PLAN  No question data found.  Anticoagulation Summary as of 5/10/2018     INR goal 2.0-3.0   Today's INR 2.1   Maintenance plan 10 mg (5 mg x 2) on Thu; 7.5 mg (5 mg x 1.5) all other days   Full instructions 10 mg on Thu; 7.5 mg all other days   Weekly total 55 mg   Plan last modified Adam Lo, [facility] (9/15/2016)   Next INR check 5/24/2018   Priority INR   Target end date Indefinite    Indications   Pulmonary embolism with infarction (H) [I26.99] [I26.99]  Long-term (current) use of anticoagulants [Z79.01] [Z79.01]         Anticoagulation Episode Summary     INR check location     Preferred lab     Send INR reminders to [facility]    Comments Emergency Contact Janae Nguyen   [phone]  HIPPA INFO OK to speak with Mother Janae Mittal [phone]  May leave messages on cell phone  pt prefers to be closer to 3 than 2  Contact Ph [phone]      Anticoagulation Care Providers     Provider Role Specialty Phone number    Sinan Lagunas MD Responsible Hematology [phone]            See the Encounter Report to view Anticoagulation Flowsheet and Dosing Calendar (Go to Encounters tab in chart review, and find the Anticoagulation Therapy Visit)    Spoke with Hayden.     Soledad Resendiz RN

## 2018-05-10 NOTE — MR AVS SNAPSHOT
Hayden TAYLOR Ike   5/10/2018   Anticoagulation Therapy Visit    Description:  32 year old male   Provider:  Soledad Resendiz, RN   Department:  [facility]           INR as of 5/10/2018     Today's INR 2.1      Anticoagulation Summary as of 5/10/2018     INR goal 2.0-3.0   Today's INR 2.1   Full instructions 10 mg on Thu; 7.5 mg all other days   Next INR check 5/24/2018    Indications   Pulmonary embolism with infarction (H) [I26.99] [I26.99]  Long-term (current) use of anticoagulants [Z79.01] [Z79.01]         May 2018 Details    Sun Mon Tue Wed Thu Fri Sat       1               2               3               4               5                 6               7               8               9               10      10 mg   See details      11      7.5 mg         12      7.5 mg           13      7.5 mg         14      7.5 mg         15      7.5 mg         16      7.5 mg         17      10 mg         18      7.5 mg         19      7.5 mg           20      7.5 mg         21      7.5 mg         22      7.5 mg         23      7.5 mg         24            25               26                 27               28               29               30               31                  Date Details   05/10 This INR check       Date of next INR:  5/24/2018         How to take your warfarin dose     To take:  7.5 mg Take 1.5 of the 5 mg tablets.    To take:  10 mg Take 2 of the 5 mg tablets.

## 2018-05-22 ENCOUNTER — ANTICOAGULATION THERAPY VISIT (OUTPATIENT)
Dept: ANTICOAGULATION | Facility: CLINIC | Age: 33
End: 2018-05-22

## 2018-05-22 DIAGNOSIS — I26.99 PULMONARY EMBOLISM WITH INFARCTION (H): ICD-10-CM

## 2018-05-22 DIAGNOSIS — Z79.01 LONG-TERM (CURRENT) USE OF ANTICOAGULANTS: ICD-10-CM

## 2018-05-22 LAB — INR PPP: 2.5

## 2018-05-22 NOTE — PROGRESS NOTES
ANTICOAGULATION FOLLOW-UP CLINIC VISIT    Patient Name:  Hayden Ramires  Date:  5/22/2018  Contact Type:  Telephone    SUBJECTIVE:     Patient Findings     Positives No Problem Findings           OBJECTIVE    INR   Date Value Ref Range Status   05/22/2018 2.50  Final     Comment:     Home Monitoring Machine        ASSESSMENT / PLAN  INR assessment THER    Recheck INR In: 2 WEEKS    INR Location Home INR      Anticoagulation Summary as of 5/22/2018     INR goal 2.0-3.0   Today's INR 2.50   Maintenance plan 10 mg (5 mg x 2) on Thu; 7.5 mg (5 mg x 1.5) all other days   Full instructions 10 mg on Thu; 7.5 mg all other days   Weekly total 55 mg   Plan last modified Adam Lo, [facility] (9/15/2016)   Next INR check 6/5/2018   Priority INR   Target end date Indefinite    Indications   Pulmonary embolism with infarction (H) [I26.99] [I26.99]  Long-term (current) use of anticoagulants [Z79.01] [Z79.01]         Anticoagulation Episode Summary     INR check location     Preferred lab     Send INR reminders to [facility]    Comments Emergency Contact Janae Nguyen   [phone]  HIPPA INFO OK to speak with Mother Janae or Deanne Mittal [phone]  May leave messages on cell phone  pt prefers to be closer to 3 than 2  Contact Ph [phone]      Anticoagulation Care Providers     Provider Role Specialty Phone number    Sinan Lagunas MD Responsible Hematology [phone]            See the Encounter Report to view Anticoagulation Flowsheet and Dosing Calendar (Go to Encounters tab in chart review, and find the Anticoagulation Therapy Visit)    Spoke with patient. Gave them their lab results and new warfarin recommendation.  No changes in health, medication, or diet. No missed doses, no falls. No signs or symptoms of bleed or clotting.     Ana Wong, RN     ADDENDUM from 5/24:  Call rec'd from Susan in [facility] stating they rec'd a request for coumadin renewal.  She will  provide a 30 day supply & notify the pt he must be seen by the MD as it has been 2 1/2 years since Dr Lagunas has seen this pt.  Jovon RN        addendum 6/1/18 Patient is able to go 4 weeks in between INR draws per Severo PHILLIPS

## 2018-05-22 NOTE — MR AVS SNAPSHOT
Hayden TAYLOR Ike   5/22/2018   Anticoagulation Therapy Visit    Description:  32 year old male   Provider:  Ana Wong, RN   Department:  [facility]           INR as of 5/22/2018     Today's INR 2.50      Anticoagulation Summary as of 5/22/2018     INR goal 2.0-3.0   Today's INR 2.50   Full instructions 10 mg on Thu; 7.5 mg all other days   Next INR check 6/5/2018    Indications   Pulmonary embolism with infarction (H) [I26.99] [I26.99]  Long-term (current) use of anticoagulants [Z79.01] [Z79.01]         May 2018 Details    Sun Mon Tue Wed Thu Fri Sat       1               2               3               4               5                 6               7               8               9               10               11               12                 13               14               15               16               17               18               19                 20               21               22      7.5 mg   See details      23      7.5 mg         24      10 mg         25      7.5 mg         26      7.5 mg           27      7.5 mg         28      7.5 mg         29      7.5 mg         30      7.5 mg         31      10 mg            Date Details   05/22 This INR check               How to take your warfarin dose     To take:  7.5 mg Take 1.5 of the 5 mg tablets.    To take:  10 mg Take 2 of the 5 mg tablets.           June 2018 Details    Sun Mon Tue Wed Thu Fri Sat          1      7.5 mg         2      7.5 mg           3      7.5 mg         4      7.5 mg         5            6               7               8               9                 10               11               12               13               14               15               16                 17               18               19               20               21               22               23                 24               25               26               27               28               29               30                 Date Details   No additional details    Date of next INR:  6/5/2018         How to take your warfarin dose     To take:  7.5 mg Take 1.5 of the 5 mg tablets.

## 2018-06-01 ENCOUNTER — OFFICE VISIT (OUTPATIENT)
Dept: HEMATOLOGY | Facility: CLINIC | Age: 33
End: 2018-06-01
Attending: PHYSICIAN ASSISTANT
Payer: COMMERCIAL

## 2018-06-01 VITALS
TEMPERATURE: 97.9 F | HEIGHT: 73 IN | DIASTOLIC BLOOD PRESSURE: 84 MMHG | WEIGHT: 191.7 LBS | HEART RATE: 61 BPM | RESPIRATION RATE: 12 BRPM | SYSTOLIC BLOOD PRESSURE: 122 MMHG | BODY MASS INDEX: 25.41 KG/M2 | OXYGEN SATURATION: 100 %

## 2018-06-01 DIAGNOSIS — Z79.01 CHRONIC ANTICOAGULATION: Primary | ICD-10-CM

## 2018-06-01 PROCEDURE — G0463 HOSPITAL OUTPT CLINIC VISIT: HCPCS

## 2018-06-01 PROCEDURE — 99214 OFFICE O/P EST MOD 30 MIN: CPT | Performed by: PHYSICIAN ASSISTANT

## 2018-06-01 RX ORDER — WARFARIN SODIUM 7.5 MG/1
TABLET ORAL
Qty: 90 TABLET | Refills: 4 | Status: SHIPPED | OUTPATIENT
Start: 2018-06-01 | End: 2019-09-11

## 2018-06-01 RX ORDER — WARFARIN SODIUM 10 MG/1
TABLET ORAL
Qty: 15 TABLET | Refills: 4 | Status: SHIPPED | OUTPATIENT
Start: 2018-06-01 | End: 2019-06-30

## 2018-06-01 ASSESSMENT — PAIN SCALES - GENERAL: PAINLEVEL: NO PAIN (0)

## 2018-06-01 NOTE — MR AVS SNAPSHOT
"              After Visit Summary   6/1/2018    Hayden Ramires    MRN: [id number]           Patient Information     Date Of Birth          1985        Visit Information        Provider Department      6/1/2018 9:00 AM Alma Jaramillo PA [facility]        Care Instructions    1. Continue anticoagulation with Coumadin with target range of 2-3.  2. Return to clinic annually and as needed.           Follow-ups after your visit        Who to contact     If you have questions or need follow up information about today's clinic visit or your schedule please contact [facility] directly at [phone].  Normal or non-critical lab and imaging results will be communicated to you by [employer], letter or phone within 4 business days after the clinic has received the results. If you do not hear from us within 7 days, please contact the clinic through [employer] or phone. If you have a critical or abnormal lab result, we will notify you by phone as soon as possible.  Submit refill requests through Inbox or call your pharmacy and they will forward the refill request to us. Please allow 3 business days for your refill to be completed.          Additional Information About Your Visit        MyChart Information     Inbox gives you secure access to your electronic health record. If you see a primary care provider, you can also send messages to your care team and make appointments. If you have questions, please call your primary care clinic.  If you do not have a primary care provider, please call [phone] and they will assist you.        Care EveryWhere ID     This is your Care EveryWhere ID. This could be used by other organizations to access your [place] medical records  [id number]        Your Vitals Were     Pulse Temperature Respirations Height Pulse Oximetry BMI (Body Mass Index)    61 97.9  F (36.6  C) (Oral) 12 1.847 m (6' 0.7\") 100% 25.5 kg/m2       " Blood Pressure from Last 3 Encounters:   06/01/18 122/84   12/21/15 (!) 155/99    Weight from Last 3 Encounters:   06/01/18 87 kg (191 lb 11.2 oz)   12/21/15 93.7 kg (206 lb 8 oz)              Today, you had the following     No orders found for display       Primary Care Provider    None Specified       No primary provider on file.        Equal Access to Services     [facility]: Hadii aad ku hadasho Soomaali, waaxda luqadaha, qaybta kaalmada adereinieryada, waxay idiin hayyovanyn brennon guillenazaliakevin thomas . So [facility] [phone].    ATENCIÓN: Si prabhala endy, tiene a krishnan disposición servicios gratuitos de asistencia lingüística. Bgame al [phone].    We comply with applicable federal civil rights laws and [place] laws. We do not discriminate on the basis of race, color, national origin, age, disability, sex, sexual orientation, or gender identity.            Thank you!     Thank you for choosing [facility]  for your care. Our goal is always to provide you with excellent care. Hearing back from our patients is one way we can continue to improve our services. Please take a few minutes to complete the written survey that you may receive in the mail after your visit with us. Thank you!             Your Updated Medication List - Protect others around you: Learn how to safely use, store and throw away your medicines at www.disposemymeds.org.          This list is accurate as of 6/1/18 10:00 AM.  Always use your most recent med list.                   Brand Name Dispense Instructions for use Diagnosis    cetirizine 10 MG tablet    zyrTEC     Take 10 mg by mouth daily        multivitamin, therapeutic Tabs tablet      Take 1 tablet by mouth daily        OMEGA-3 FISH OIL PO      Take 1 g by mouth daily        SERTRALINE HCL PO      Take 100 mg by mouth daily        * WARFARIN SODIUM PO      Take 7.5 mg by mouth daily        * warfarin 7.5 MG tablet    COUMADIN    94 tablet    Take one to two  tablets daily or as directed by the Coumadin clinic    Pulmonary embolus (H)       * warfarin 7.5 MG tablet    COUMADIN    90 tablet    TAKE ONE TABLET BY MOUTH 6 DAYS PER WEEK OR AS DIRECTED. TAKE 10MG TABLET ON THURSDAY    DVT (deep venous thrombosis) (H)       * warfarin 10 MG tablet    COUMADIN    15 tablet    TAKE 1 TABLET BY MOUTH ON THURSDAYS AND 7.5MG TABLETS ALL OTHER DAYS OR AS DIRECTED BY THE ANTICOAGULATION CLINIC.    DVT (deep venous thrombosis) (H)       * Notice:  This list has 4 medication(s) that are the same as other medications prescribed for you. Read the directions carefully, and ask your doctor or other care provider to review them with you.

## 2018-06-01 NOTE — PATIENT INSTRUCTIONS
1. Continue anticoagulation with Coumadin with target range of 2-3.  2. Return to clinic annually and as needed.

## 2018-06-01 NOTE — NURSING NOTE
Patient with history of pulmonary embolism (2012) and Antithrombin Deficiency here with his wife to review anticoagulation plan.   After discussion with Alma Jaramillo PA-C it was decided that he would continue anticoagulation with Coumadin with an INR target range of 2-3.  Basics of antithrombin deficiency reviewed.  DOAC's in AT Deficiency discussed.  VTE risk factors, signs, and symptoms reviewed.   Discussed need to talk to provider's regarding any major surgeries (possible need for thrombate).   His INR has been very stable. Will discuss with monitoring team to allow for testing up to every 4 weeks.  Patient instructions reviewed.  Family has the contact information for the Center and were encouraged top call with questions or concerns.

## 2018-06-01 NOTE — PROGRESS NOTES
[facility]  [address] [place] [id number]  Phone: [phone], Fax: [phone]      Patient: Hayden Ramires  MRN: [id number]  : 1985  ANABEL: 2018    HPI:  Mr. Hayden Ramires is a 32 year old male with a personal history of antithrombin deficiency and PE in  and a family history of antithrombin deficiency who presents today for hematology follow-up.  We last saw Hayden in 2015.  He states he has done very well since we saw him.  He remains on warfarin is still using the medication monitoring clinic and has a home monitor.  He has only had one INR <2.0 in the last year and none in the last 6 months.        Hayden is here today with his wife and he states they are pregnant with a baby boy due in 2018.  They would like the child tested for antithrombin deficiency when he is old enough.    He reports that his brother was first diagnosed with clots at age 21 with DVT/PE.  Then his Dad was diagnosed with a PE at age 47 while he was bed-bound with a pneumonia.  His Dad was found to have AT deficiency with a level of 53%.  His dad reportedly saw Dr. Harry (Vascular Surgery) at the [facility] and saw a genetic counselor in .  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  He developed some respiratory symptoms, but mostly severe back pain.  He went to the hospital and was found to have a pulmonary embolism in 2012.  On 12, LE Dopplers of lower extremities showed no DVTs.  He was also found to have AT deficiency with a level of 52%.  He was started on warfarin at that time and has remained on it.  Historically he had issues with labile INR (ranging from 1.5-6.1). This is now much improved.     ROS:  Denies any bleeding issues. No gum bleeding, No nose bleeds. Denies any hematuria or blood in stools. Denies any ecchymosis. Denies any lower extremity swelling or pain. Denies any fever, no chest  pain. Denies any shortness of breath.   He did have an ED visit in April 2018 for facial laceration obtained doing his job as a .      Past Medical History:  Past Medical History:   Diagnosis Date     Allergic rhinitis      Antithrombin III deficiency (H)      Anxiety      Asthma      Depression      Pulmonary embolism (H)        Past Surgical History:  No past surgical history on file.    Medications:  Current Outpatient Prescriptions   Medication Sig Dispense Refill     warfarin (COUMADIN) 10 MG tablet TAKE 1 TABLET BY MOUTH ON THURSDAYS AND 7.5MG TABLETS ALL OTHER DAYS OR AS DIRECTED BY THE ANTICOAGULATION CLINIC. 15 tablet 0     warfarin (COUMADIN) 7.5 MG tablet TAKE ONE TABLET BY MOUTH 6 DAYS PER WEEK OR AS DIRECTED. TAKE 10MG TABLET ON THURSDAY 90 tablet 0     warfarin (COUMADIN) 7.5 MG tablet Take one to two tablets daily or as directed by the Coumadin clinic 94 tablet 1     WARFARIN SODIUM PO Take 7.5 mg by mouth daily       cetirizine (ZYRTEC) 10 MG tablet Take 10 mg by mouth daily       multivitamin, therapeutic (THERA-VIT) TABS Take 1 tablet by mouth daily       Omega-3 Fatty Acids (OMEGA-3 FISH OIL PO) Take 1 g by mouth daily       SERTRALINE HCL PO Take 100 mg by mouth daily          Allergies:  No Known Allergies    Social History:  Denies any tobacco use. No significant alcohol use. Denies any illicit drug use. Patient works as a  and volunteer firefigher.    Family History:  See above and see genetic counseling note from 12/21/15.  Has extensive family hx of thrombosis and antithrombin deficiency.    Objectives:  Pleasant 32 year old male in no acute distress.  Vitals: B/P: 122/84, T: 97.9, P: 61, R: 12, Wt: 191 lbs 11.2 oz  Exam:   No distress, no work of breathing    Labs:  Review of recent INR, in goal range 100% of time in last 6 months    Assessment/Plan:  31 yo male with hx of PE and antithrombin deficiency on long term anticoagulation with warfarin.  Doing well and has home INR  monitor and works with medication monitoring clinic.  Discussed briefly DOAC medications and Hayden is not interested in changing as things are going well.  Rx for warfarin will be written today (7.5mg 6 days/week and 10mg one day/week).     Asked that he call us with any procedures or injuries and return to clinic annually.   The patient is given our center's contact information and is instructed to call if he/she should have any further questions or concerns.    Barbra Trujillo, nurse clinician is present throughout the entire clinic visit with the patient today.  Patient understands and agrees with the above plan and recommendation.    Total Time Spent:  30 minutes, all 30 minutes was spent on face-to-face consultation of the patient and coordination of care in regard to long term anticoagulation.      Alma Jaramillo MPH, PA-C  Physician Assistant  [facility]  [phone]-main line

## 2018-06-26 LAB — INR PPP: 2.3

## 2018-06-27 ENCOUNTER — ANTICOAGULATION THERAPY VISIT (OUTPATIENT)
Dept: ANTICOAGULATION | Facility: CLINIC | Age: 33
End: 2018-06-27

## 2018-06-27 DIAGNOSIS — I26.99 PULMONARY EMBOLISM WITH INFARCTION (H): ICD-10-CM

## 2018-06-27 DIAGNOSIS — Z79.01 LONG-TERM (CURRENT) USE OF ANTICOAGULANTS: ICD-10-CM

## 2018-06-27 NOTE — PROGRESS NOTES
ANTICOAGULATION FOLLOW-UP CLINIC VISIT    Patient Name:  Hayden Ramires  Date:  6/27/2018  Contact Type:  Telephone    SUBJECTIVE:     Patient Findings     Positives No Problem Findings           OBJECTIVE    INR   Date Value Ref Range Status   06/26/2018 2.30  Final     Comment:     Home Monitoring Machine        ASSESSMENT / PLAN  INR assessment THER    Recheck INR In: 4 WEEKS    INR Location Home INR      Anticoagulation Summary as of 6/27/2018     INR goal 2.0-3.0   Today's INR 2.30 (6/26/2018)   Warfarin maintenance plan 10 mg (5 mg x 2) on Thu; 7.5 mg (5 mg x 1.5) all other days   Full warfarin instructions 10 mg on Thu; 7.5 mg all other days   Weekly warfarin total 55 mg   Plan last modified Adam Lo, [facility] (9/15/2016)   Next INR check 7/24/2018   Priority INR   Target end date Indefinite    Indications   Pulmonary embolism with infarction (H) [I26.99] [I26.99]  Long-term (current) use of anticoagulants [Z79.01] [Z79.01]         Anticoagulation Episode Summary     INR check location     Preferred lab     Send IN[facility] reminders to [facility]    Comments Okay to go 4 weeks in between INR's per Semaj 6/1/18  HIPPA INFO OK to speak with Mother Janae Mittal [phone]  May leave messages on cell phone  pt prefers to be closer to 3 than 2  Contact Ph [phone]      Anticoagulation Care Providers     Provider Role Specialty Phone number    Sinan Lagunas MD Responsible Hematology [phone]            See the Encounter Report to view Anticoagulation Flowsheet and Dosing Calendar (Go to Encounters tab in chart review, and find the Anticoagulation Therapy Visit)    Spoke with patient. Gave them their lab results and new warfarin recommendation.  No changes in health, medication, or diet. No missed doses, no falls. No signs or symptoms of bleed or clotting.     Ana Wong RN

## 2018-06-27 NOTE — MR AVS SNAPSHOT
Hayden Nguyent   6/27/2018   Anticoagulation Therapy Visit    Description:  32 year old male   Provider:  Ana Wong, RN   Department:  [facility]           INR as of 6/27/2018     Today's INR 2.30 (6/26/2018)      Anticoagulation Summary as of 6/27/2018     INR goal 2.0-3.0   Today's INR 2.30 (6/26/2018)   Full warfarin instructions 10 mg on Thu; 7.5 mg all other days   Next INR check 7/24/2018    Indications   Pulmonary embolism with infarction (H) [I26.99] [I26.99]  Long-term (current) use of anticoagulants [Z79.01] [Z79.01]         June 2018 Details    Sun Mon Tue Wed Thu Fri Sat          1               2                 3               4               5               6               7               8               9                 10               11               12               13               14               15               16                 17               18               19               20               21               22               23                 24               25               26               27      7.5 mg   See details      28      10 mg         29      7.5 mg         30      7.5 mg          Date Details   06/27 This INR check               How to take your warfarin dose     To take:  7.5 mg Take 1.5 of the 5 mg tablets.    To take:  10 mg Take 2 of the 5 mg tablets.           July 2018 Details    Sun Mon Tue Wed Thu Fri Sat     1      7.5 mg         2      7.5 mg         3      7.5 mg         4      7.5 mg         5      10 mg         6      7.5 mg         7      7.5 mg           8      7.5 mg         9      7.5 mg         10      7.5 mg         11      7.5 mg         12      10 mg         13      7.5 mg         14      7.5 mg           15      7.5 mg         16      7.5 mg         17      7.5 mg         18      7.5 mg         19      10 mg         20      7.5 mg         21      7.5 mg           22      7.5 mg         23      7.5 mg         24            25                26               27               28                 29               30               31                    Date Details   No additional details    Date of next INR:  7/24/2018         How to take your warfarin dose     To take:  7.5 mg Take 1.5 of the 5 mg tablets.    To take:  10 mg Take 2 of the 5 mg tablets.

## 2018-08-01 ENCOUNTER — ANTICOAGULATION THERAPY VISIT (OUTPATIENT)
Dept: ANTICOAGULATION | Facility: CLINIC | Age: 33
End: 2018-08-01

## 2018-08-01 DIAGNOSIS — I26.99 PULMONARY EMBOLISM WITH INFARCTION (H): ICD-10-CM

## 2018-08-01 DIAGNOSIS — Z79.01 LONG-TERM (CURRENT) USE OF ANTICOAGULANTS: ICD-10-CM

## 2018-08-01 LAB — INR PPP: 2.5

## 2018-08-01 NOTE — MR AVS SNAPSHOT
Hayden HARVEY Ike   8/1/2018   Anticoagulation Therapy Visit    Description:  32 year old male   Provider:  Ana Wong, RN   Department:  [facility]           INR as of 8/1/2018     Today's INR 2.50      Anticoagulation Summary as of 8/1/2018     INR goal 2.0-3.0   Today's INR 2.50   Full warfarin instructions 10 mg on Thu; 7.5 mg all other days   Next INR check 8/29/2018    Indications   Pulmonary embolism with infarction (H) [I26.99] [I26.99]  Long-term (current) use of anticoagulants [Z79.01] [Z79.01]         August 2018 Details    Sun Mon Tue Wed Thu Fri Sat        1      7.5 mg   See details      2      10 mg         3      7.5 mg         4      7.5 mg           5      7.5 mg         6      7.5 mg         7      7.5 mg         8      7.5 mg         9      10 mg         10      7.5 mg         11      7.5 mg           12      7.5 mg         13      7.5 mg         14      7.5 mg         15      7.5 mg         16      10 mg         17      7.5 mg         18      7.5 mg           19      7.5 mg         20      7.5 mg         21      7.5 mg         22      7.5 mg         23      10 mg         24      7.5 mg         25      7.5 mg           26      7.5 mg         27      7.5 mg         28      7.5 mg         29            30               31                 Date Details   08/01 This INR check       Date of next INR:  8/29/2018         How to take your warfarin dose     To take:  7.5 mg Take 1.5 of the 5 mg tablets.    To take:  10 mg Take 2 of the 5 mg tablets.

## 2018-08-01 NOTE — PROGRESS NOTES
ANTICOAGULATION FOLLOW-UP CLINIC VISIT    Patient Name:  Hayden Ramires  Date:  8/1/2018  Contact Type:  Telephone    SUBJECTIVE:     Patient Findings     Positives No Problem Findings           OBJECTIVE    INR   Date Value Ref Range Status   08/01/2018 2.50  Final     Comment:     Home Monitoring Machine        ASSESSMENT / PLAN  INR assessment THER    Recheck INR In: 4 WEEKS    INR Location Home INR      Anticoagulation Summary as of 8/1/2018     INR goal 2.0-3.0   Today's INR 2.50   Warfarin maintenance plan 10 mg (5 mg x 2) on Thu; 7.5 mg (5 mg x 1.5) all other days   Full warfarin instructions 10 mg on Thu; 7.5 mg all other days   Weekly warfarin total 55 mg   Plan last modified Adam Lo, [facility] (9/15/2016)   Next INR check 8/29/2018   Priority INR   Target end date Indefinite    Indications   Pulmonary embolism with infarction (H) [I26.99] [I26.99]  Long-term (current) use of anticoagulants [Z79.01] [Z79.01]         Anticoagulation Episode Summary     INR check location     Preferred lab     Send INR reminders to [facility]    Comments Okay to go 4 weeks in between INR's per Semaj 6/1/18  HIPPA INFO OK to speak with Mother Janae or Deanne Mittal [phone]  May leave messages on cell phone  pt prefers to be closer to 3 than 2  Contact Ph [phone]      Anticoagulation Care Providers     Provider Role Specialty Phone number    Sinan Lagunas MD Responsible Hematology [phone]            See the Encounter Report to view Anticoagulation Flowsheet and Dosing Calendar (Go to Encounters tab in chart review, and find the Anticoagulation Therapy Visit)    Spoke with patient. Gave them their lab results and new warfarin recommendation.  No changes in health, medication, or diet. No missed doses, no falls. No signs or symptoms of bleed or clotting.     Ana Wong RN

## 2018-09-07 ENCOUNTER — ANTICOAGULATION THERAPY VISIT (OUTPATIENT)
Dept: ANTICOAGULATION | Facility: CLINIC | Age: 33
End: 2018-09-07

## 2018-09-07 DIAGNOSIS — I26.99 PULMONARY EMBOLISM WITH INFARCTION (H): ICD-10-CM

## 2018-09-07 DIAGNOSIS — Z79.01 LONG-TERM (CURRENT) USE OF ANTICOAGULANTS: ICD-10-CM

## 2018-09-07 LAB — INR PPP: 2.7

## 2018-09-07 NOTE — PROGRESS NOTES
ANTICOAGULATION FOLLOW-UP CLINIC VISIT    Patient Name:  Hayden Ramires  Date:  9/7/2018  Contact Type:  Telephone    SUBJECTIVE:     Patient Findings     Positives No Problem Findings           OBJECTIVE    INR   Date Value Ref Range Status   09/07/2018 2.7  Final       ASSESSMENT / PLAN  No question data found.  Anticoagulation Summary as of 9/7/2018     INR goal 2.0-3.0   Today's INR 2.7   Warfarin maintenance plan 10 mg (5 mg x 2) on Thu; 7.5 mg (5 mg x 1.5) all other days   Full warfarin instructions 10 mg on Thu; 7.5 mg all other days   Weekly warfarin total 55 mg   Plan last modified Adam Lo, [facility] (9/15/2016)   Next INR check 10/5/2018   Priority INR   Target end date Indefinite    Indications   Pulmonary embolism with infarction (H) [I26.99] [I26.99]  Long-term (current) use of anticoagulants [Z79.01] [Z79.01]         Anticoagulation Episode Summary     INR check location     Preferred lab     Send INR reminders to [facility]    Comments Okay to go 4 weeks in between INR's per Semaj 6/1/18  HIPPA INFO OK to speak with Mother Janae Mittal [phone]  May leave messages on cell phone  pt prefers to be closer to 3 than 2  Contact Ph [phone]      Anticoagulation Care Providers     Provider Role Specialty Phone number    Sinan Lagunas MD Responsible Hematology [phone]            See the Encounter Report to view Anticoagulation Flowsheet and Dosing Calendar (Go to Encounters tab in chart review, and find the Anticoagulation Therapy Visit)    Spoke with Hayden.     Soledad Resendiz RN

## 2018-09-07 NOTE — MR AVS SNAPSHOT
Hayden HARVEY Ike   9/7/2018   Anticoagulation Therapy Visit    Description:  32 year old male   Provider:  Soledad Resendiz, RN   Department:  [facility]           INR as of 9/7/2018     Today's INR 2.7      Anticoagulation Summary as of 9/7/2018     INR goal 2.0-3.0   Today's INR 2.7   Full warfarin instructions 10 mg on Thu; 7.5 mg all other days   Next INR check 10/5/2018    Indications   Pulmonary embolism with infarction (H) [I26.99] [I26.99]  Long-term (current) use of anticoagulants [Z79.01] [Z79.01]         September 2018 Details    Sun Mon Tue Wed Thu Fri Sat           1                 2               3               4               5               6               7      7.5 mg   See details      8      7.5 mg           9      7.5 mg         10      7.5 mg         11      7.5 mg         12      7.5 mg         13      10 mg         14      7.5 mg         15      7.5 mg           16      7.5 mg         17      7.5 mg         18      7.5 mg         19      7.5 mg         20      10 mg         21      7.5 mg         22      7.5 mg           23      7.5 mg         24      7.5 mg         25      7.5 mg         26      7.5 mg         27      10 mg         28      7.5 mg         29      7.5 mg           30      7.5 mg                Date Details   09/07 This INR check               How to take your warfarin dose     To take:  7.5 mg Take 1.5 of the 5 mg tablets.    To take:  10 mg Take 2 of the 5 mg tablets.           October 2018 Details    Sun Mon Tue Wed Thu Fri Sat      1      7.5 mg         2      7.5 mg         3      7.5 mg         4      10 mg         5            6                 7               8               9               10               11               12               13                 14               15               16               17               18               19               20                 21               22               23               24               25                26               27                 28               29               30               31                   Date Details   No additional details    Date of next INR:  10/5/2018         How to take your warfarin dose     To take:  7.5 mg Take 1.5 of the 5 mg tablets.    To take:  10 mg Take 2 of the 5 mg tablets.

## 2018-10-15 ENCOUNTER — ANTICOAGULATION THERAPY VISIT (OUTPATIENT)
Dept: ANTICOAGULATION | Facility: CLINIC | Age: 33
End: 2018-10-15

## 2018-10-15 DIAGNOSIS — I26.99 PULMONARY EMBOLISM WITH INFARCTION (H): ICD-10-CM

## 2018-10-15 LAB — INR PPP: 1.1

## 2018-10-15 NOTE — PROGRESS NOTES
ANTICOAGULATION FOLLOW-UP CLINIC VISIT    Patient Name:  Adilson Ramires  Date:  10/15/2018  Contact Type:  Telephone    SUBJECTIVE:     Patient Findings     Positives Med error (Has been missing doses.)           OBJECTIVE    INR   Date Value Ref Range Status   10/15/2018 1.1  Final       ASSESSMENT / PLAN  No question data found.  Anticoagulation Summary as of 10/15/2018     INR goal 2.0-3.0   Today's INR 1.1!   Warfarin maintenance plan 10 mg (5 mg x 2) on Thu; 7.5 mg (5 mg x 1.5) all other days   Full warfarin instructions 10/15: 10 mg; 10/16: 10 mg; 10/17: 10 mg; Otherwise 10 mg on Thu; 7.5 mg all other days   Weekly warfarin total 55 mg   Plan last modified Adam Lo [facility] (9/15/2016)   Next INR check 10/29/2018   Priority INR   Target end date Indefinite    Indications   Pulmonary embolism with infarction (H) [I26.99] [I26.99]  Long-term (current) use of anticoagulants [Z79.01] [Z79.01]         Anticoagulation Episode Summary     INR check location     Preferred lab     Send INR reminders to [facility]    Comments Okay to go 4 weeks in between INR's per Semaj 6/1/18  HIPPA INFO OK to speak with Mother Janae Mittal [phone]  May leave messages on cell phone  pt prefers to be closer to 3 than 2  Contact Ph [phone]      Anticoagulation Care Providers     Provider Role Specialty Phone number    Sinan Lagunas MD Responsible Hematology [phone]            See the Encounter Report to view Anticoagulation Flowsheet and Dosing Calendar (Go to Encounters tab in chart review, and find the Anticoagulation Therapy Visit)    Spoke with adilson today. He said they just had a baby. He has missed doses of his warfarin. Not able to tell me how many or when missed.    Adam Lo [facility]

## 2018-10-15 NOTE — MR AVS SNAPSHOT
Hayden HARVEY Ike   10/15/2018   Anticoagulation Therapy Visit    Description:  33 year old male   Provider:  Adam Lo, [facility]   Department:  [facility]           INR as of 10/15/2018     Today's INR 1.1!      Anticoagulation Summary as of 10/15/2018     INR goal 2.0-3.0   Today's INR 1.1!   Full warfarin instructions 10/15: 10 mg; 10/16: 10 mg; 10/17: 10 mg; Otherwise 10 mg on Thu; 7.5 mg all other days   Next INR check 10/29/2018    Indications   Pulmonary embolism with infarction (H) [I26.99] [I26.99]  Long-term (current) use of anticoagulants [Z79.01] [Z79.01]         October 2018 Details    Sun Mon Tue Wed Thu Fri Sat      1               2               3               4               5               6                 7               8               9               10               11               12               13                 14               15      10 mg   See details      16      10 mg         17      10 mg         18      10 mg         19      7.5 mg         20      7.5 mg           21      7.5 mg         22      7.5 mg         23      7.5 mg         24      7.5 mg         25      10 mg         26      7.5 mg         27      7.5 mg           28      7.5 mg         29            30               31                   Date Details   10/15 This INR check       Date of next INR:  10/29/2018         How to take your warfarin dose     To take:  7.5 mg Take 1.5 of the 5 mg tablets.    To take:  10 mg Take 2 of the 5 mg tablets.

## 2018-10-31 ENCOUNTER — ANTICOAGULATION THERAPY VISIT (OUTPATIENT)
Dept: ANTICOAGULATION | Facility: CLINIC | Age: 33
End: 2018-10-31

## 2018-10-31 DIAGNOSIS — I26.99 PULMONARY EMBOLISM WITH INFARCTION (H): ICD-10-CM

## 2018-10-31 LAB — INR PPP: 2.2 (ref 0.8–1.2)

## 2018-10-31 NOTE — PROGRESS NOTES
ANTICOAGULATION FOLLOW-UP CLINIC VISIT    Patient Name:  Hayden Ramires  Date:  10/31/2018  Contact Type:  Telephone    SUBJECTIVE:     Patient Findings     Positives No Problem Findings           OBJECTIVE    INR   Date Value Ref Range Status   10/31/2018 2.2 (A) 0.8 - 1.2 Final       ASSESSMENT / PLAN  INR assessment THER    Recheck INR In: 4 WEEKS    INR Location Home INR      Anticoagulation Summary as of 10/31/2018     INR goal 2.0-3.0   Today's INR 2.2   Warfarin maintenance plan 10 mg (5 mg x 2) on Thu; 7.5 mg (5 mg x 1.5) all other days   Full warfarin instructions 10 mg on Thu; 7.5 mg all other days   Weekly warfarin total 55 mg   Plan last modified Adam Lo [facility] (9/15/2016)   Next INR check 11/28/2018   Priority INR   Target end date Indefinite    Indications   Pulmonary embolism with infarction (H) [I26.99] [I26.99]  Long-term (current) use of anticoagulants [Z79.01] [Z79.01]         Anticoagulation Episode Summary     INR check location     Preferred lab     Send [facility] reminders to [facility]    Comments Okay to go 4 weeks in between INR's per Semaj 6/1/18  HIPPA INFO OK to speak with Mother Janae Mittal [phone]  May leave messages on cell phone  pt prefers to be closer to 3 than 2  Contact Ph [phone]      Anticoagulation Care Providers     Provider Role Specialty Phone number    Sinan Lagunas MD Responsible Hematology [phone]            See the Encounter Report to view Anticoagulation Flowsheet and Dosing Calendar (Go to Encounters tab in chart review, and find the Anticoagulation Therapy Visit)    Spoke with patient. Gave them their lab results and new warfarin recommendation.  No changes in health, medication, or diet. No missed doses, no falls. No signs or symptoms of bleed or clotting.      Adam Lo RPH

## 2018-12-17 ENCOUNTER — ANTICOAGULATION THERAPY VISIT (OUTPATIENT)
Dept: ANTICOAGULATION | Facility: CLINIC | Age: 33
End: 2018-12-17

## 2018-12-17 DIAGNOSIS — I26.99 PULMONARY EMBOLISM WITH INFARCTION (H): ICD-10-CM

## 2018-12-17 LAB — INR PPP: 2.3

## 2019-01-21 ENCOUNTER — ANTICOAGULATION THERAPY VISIT (OUTPATIENT)
Dept: ANTICOAGULATION | Facility: CLINIC | Age: 34
End: 2019-01-21

## 2019-01-21 DIAGNOSIS — I26.99 PULMONARY EMBOLISM WITH INFARCTION (H): ICD-10-CM

## 2019-01-21 LAB — INR PPP: 2.2

## 2019-01-21 NOTE — PROGRESS NOTES
ANTICOAGULATION FOLLOW-UP CLINIC VISIT    Patient Name:  Hayden Ramires  Date:  2019  Contact Type:  Telephone    SUBJECTIVE:     Patient Findings     Positives:   No Problem Findings           OBJECTIVE    INR   Date Value Ref Range Status   2019 2.2  Final     Comment:     Home INR monitor       ASSESSMENT / PLAN  INR assessment THER    Recheck INR In: 4 WEEKS    INR Location Home INR      Anticoagulation Summary  As of 2019    INR goal:   2.0-3.0   TTR:   90.9 % (2.6 y)   INR used for dosin.2 (2019)   Warfarin maintenance plan:   10 mg (5 mg x 2) every Thu; 7.5 mg (5 mg x 1.5) all other days   Full warfarin instructions:   10 mg every Thu; 7.5 mg all other days   Weekly warfarin total:   55 mg   No change documented:   Thierry Garcia RN   Plan last modified:   Adam Lo [facility] (9/15/2016)   Next INR check:   2019   Priority:   INR   Target end date:   Indefinite    Indications    Pulmonary embolism with infarction (H) [I26.99] [I26.99]  Long-term (current) use of anticoagulants [Z79.01] [Z79.01]             Anticoagulation Episode Summary     INR check location:       Preferred lab:       Send INR reminders to:   [facility]    Comments:   Okay to go 4 weeks in between INR's per Semaj 18  HIPPA INFO OK to speak with Mother Janae Mittal [phone]  May leave messages on cell phone  pt prefers to be closer to 3 than 2  Contact Ph [phone]      Anticoagulation Care Providers     Provider Role Specialty Phone number    Sinan Lagunas MD Responsible Hematology [phone]            See the Encounter Report to view Anticoagulation Flowsheet and Dosing Calendar (Go to Encounters tab in chart review, and find the Anticoagulation Therapy Visit)    Spoke with patient. Gave them their lab results and new warfarin recommendation.  No changes in health, medication, or diet. No missed doses, no falls. No signs or symptoms of bleed or  clotting.    Thierry Garcia, RN

## 2019-01-25 ENCOUNTER — ANTICOAGULATION THERAPY VISIT (OUTPATIENT)
Dept: ANTICOAGULATION | Facility: CLINIC | Age: 34
End: 2019-01-25

## 2019-01-25 DIAGNOSIS — I26.99 PULMONARY EMBOLISM WITH INFARCTION (H): ICD-10-CM

## 2019-01-25 LAB — INR PPP: 2.1

## 2019-01-25 NOTE — PROGRESS NOTES
ANTICOAGULATION FOLLOW-UP CLINIC VISIT    Patient Name:  Hayden Ramires  Date:  2019  Contact Type:  Telephone    SUBJECTIVE:     Patient Findings     Positives:   Change in medications (Augmentin 10 day course started .)           OBJECTIVE    INR   Date Value Ref Range Status   2019 2.1  Final       ASSESSMENT / PLAN  INR assessment THER    Recheck INR In: 10 DAYS    INR Location Home INR      Anticoagulation Summary  As of 2019    INR goal:   2.0-3.0   TTR:   90.9 % (2.6 y)   INR used for dosin.1 (2019)   Warfarin maintenance plan:   10 mg (5 mg x 2) every Thu; 7.5 mg (5 mg x 1.5) all other days   Full warfarin instructions:   10 mg every Thu; 7.5 mg all other days   Weekly warfarin total:   55 mg   No change documented:   Thierry Garcia RN   Plan last modified:   Adam Lo [facility] (9/15/2016)   Next INR check:   2019   Priority:   INR   Target end date:   Indefinite    Indications    Pulmonary embolism with infarction (H) [I26.99] [I26.99]  Long-term (current) use of anticoagulants [Z79.01] [Z79.01]             Anticoagulation Episode Summary     INR check location:       Preferred lab:       Send INR reminders to:   [facility]    Comments:   Okay to go 4 weeks in between INR's per Semaj 18  HIPPA INFO OK to speak with Mother Janae or Deanne Mittal [phone]  May leave messages on cell phone  pt prefers to be closer to 3 than 2  Contact Ph [phone]      Anticoagulation Care Providers     Provider Role Specialty Phone number    Sinan Lagunas MD Responsible Hematology [phone]            See the Encounter Report to view Anticoagulation Flowsheet and Dosing Calendar (Go to Encounters tab in chart review, and find the Anticoagulation Therapy Visit)    Spoke to Hayden.  He is starting a ten day course of Augmentin today.  Will check on home monitor on .    Thierry Garcia, RN

## 2019-02-26 ENCOUNTER — ANTICOAGULATION THERAPY VISIT (OUTPATIENT)
Dept: ANTICOAGULATION | Facility: CLINIC | Age: 34
End: 2019-02-26

## 2019-02-26 DIAGNOSIS — I26.99 PULMONARY EMBOLISM WITH INFARCTION (H): ICD-10-CM

## 2019-02-26 LAB — INR PPP: 2.4

## 2019-02-26 NOTE — PROGRESS NOTES
ANTICOAGULATION FOLLOW-UP CLINIC VISIT    Patient Name:  Hayden Ramires  Date:  2019  Contact Type:  Telephone    SUBJECTIVE:        OBJECTIVE    INR   Date Value Ref Range Status   2019 2.4  Final       ASSESSMENT / PLAN  No question data found.  Anticoagulation Summary  As of 2019    INR goal:   2.0-3.0   TTR:   91.5 % (2.7 y)   INR used for dosin.4 (2019)   Warfarin maintenance plan:   10 mg (5 mg x 2) every Thu; 7.5 mg (5 mg x 1.5) all other days   Full warfarin instructions:   10 mg every Thu; 7.5 mg all other days   Weekly warfarin total:   55 mg   Plan last modified:   Adam Lo, [facility] (9/15/2016)   Next INR check:   3/26/2019   Priority:   INR   Target end date:   Indefinite    Indications    Pulmonary embolism with infarction (H) [I26.99] [I26.99]  Long-term (current) use of anticoagulants [Z79.01] [Z79.01]             Anticoagulation Episode Summary     INR check location:       Preferred lab:       Send [facility] reminders to:    [facility]    Comments:   Okay to go 4 weeks in between INR's per Semaj 18  HIPPA INFO OK to speak with Mother Janae or Deanne Mittal [phone]  May leave messages on cell phone  pt prefers to be closer to 3 than 2  Contact Ph [phone]      Anticoagulation Care Providers     Provider Role Specialty Phone number    Sinan Lagunas MD Responsible Hematology [phone]            See the Encounter Report to view Anticoagulation Flowsheet and Dosing Calendar (Go to Encounters tab in chart review, and find the Anticoagulation Therapy Visit)    Left message for patient with results and dosing recommendations. Asked patient to call back to report any missed doses, falls, signs and symptoms of bleeding or clotting, any changes in health, medication, or diet. Asked patient to call back with any questions or concerns.     Soledad Resendiz RN

## 2019-04-02 ENCOUNTER — ANTICOAGULATION THERAPY VISIT (OUTPATIENT)
Dept: ANTICOAGULATION | Facility: CLINIC | Age: 34
End: 2019-04-02

## 2019-04-02 DIAGNOSIS — I26.99 PULMONARY EMBOLISM WITH INFARCTION (H): ICD-10-CM

## 2019-04-02 LAB — INR PPP: 6.2 (ref 0.9–1.1)

## 2019-04-02 NOTE — PROGRESS NOTES
ANTICOAGULATION FOLLOW-UP CLINIC VISIT    Patient Name:  Hayden Ramires  Date:  2019  Contact Type:  Telephone    SUBJECTIVE:     Patient Findings     Positives:   Change in medications (Hayden reports he has really bad allergies and has been taking flonase, sudafed, mucinex and zyrtec)    Comments:   He states he has no signs of bleeding.  Reviewed signs of bleeding with him; he will be seen urgently in the ER if he develops any signs or if he falls.   The only change Hayden can think of is that his allergies are very bad right now, the inflammation may be causing the supra therapeutic INR.  Requested he go in to clinic and have a venous INR drawn today, but he states he cannot go today.  He will go in tomorrow, 4/3/19 for a venous draw.  Order faxed to [facility]; fax confirmation received.  Spoke with Julissa in lab at [facility]-they will draw the INR, but it will be sent to [place] to be run.    Hayden will hold warfarin today.             OBJECTIVE    INR   Date Value Ref Range Status   2019 6.2 (A) 0.9 - 1.1 Final       ASSESSMENT / PLAN  INR assessment SUPRA    Recheck INR In: 1 DAY    INR Location Home INR      Anticoagulation Summary  As of 2019    INR goal:   2.0-3.0   TTR:   88.9 % (2.8 y)   INR used for dosin.2! (2019)   Warfarin maintenance plan:   10 mg (5 mg x 2) every Thu; 7.5 mg (5 mg x 1.5) all other days   Full warfarin instructions:   : Hold; Otherwise 10 mg every Thu; 7.5 mg all other days   Weekly warfarin total:   55 mg   Plan last modified:   Adam Lo, [facility] (9/15/2016)   Next INR check:   4/3/2019   Priority:   INR   Target end date:   Indefinite    Indications    Pulmonary embolism with infarction (H) [I26.99] [I26.99]  Long-term (current) use of anticoagulants [Z79.01] [Z79.01]             Anticoagulation Episode Summary     INR check location:       Preferred lab:       Send INR reminders to:   [facility]    Comments:   Okay to go 4 weeks in  between INR's per Alere 6/1/18  HIPPA INFO OK to speak with Mother Janae Mittal [phone]  May leave messages on cell phone  pt prefers to be closer to 3 than 2  Contact Ph [phone]      Anticoagulation Care Providers     Provider Role Specialty Phone number    Sinan Lagunas MD Responsible Hematology [phone]            See the Encounter Report to view Anticoagulation Flowsheet and Dosing Calendar (Go to Encounters tab in chart review, and find the Anticoagulation Therapy Visit)    Spoke with aHyden.  See above note.   This result is a POC--he is not able to have venous INR drawn until 4/3/19.      Phone number for [facility]:  [phone].    Salud Bosch RN

## 2019-04-03 ENCOUNTER — TELEPHONE (OUTPATIENT)
Dept: HEMATOLOGY | Facility: CLINIC | Age: 34
End: 2019-04-03

## 2019-04-03 LAB — INR PPP: 7.1

## 2019-04-04 ENCOUNTER — ANTICOAGULATION THERAPY VISIT (OUTPATIENT)
Dept: ANTICOAGULATION | Facility: CLINIC | Age: 34
End: 2019-04-04

## 2019-04-04 DIAGNOSIS — I26.99 PULMONARY EMBOLISM WITH INFARCTION (H): ICD-10-CM

## 2019-04-04 NOTE — TELEPHONE ENCOUNTER
Received call from [facility] regarding critical INR result of 7.1.  Sample was drawn at ~10 AM on 4/3/19, but did not arrive in lab until hours later.      Spoke with patient by phone at ~11:45 PM on 4/3/19.  Based on instructions from [facility] (see 4/2/19 note), he did take warfarin 7.5 mg earlier today.  Denies all bleeding symptoms.  Feels well except for ongoing allergy symptoms.    Advised him to not take any more warfarin doses until directed to do so by [facility] team.  Reviewed importance of seeking prompt medical attention in ER for any bleeding symptoms, any head injury or trauma.      Will contact [facility] team in AM and ask them to connect with patient.      Sinan Lagunas MD  Associate Professor of Medicine  Division of Hematology, Oncology, and Transplantation  Director, [facility]

## 2019-04-04 NOTE — PROGRESS NOTES
ANTICOAGULATION FOLLOW-UP CLINIC VISIT    Patient Name:  Hayden Ramires  Date:  2019  Contact Type:  Telephone    SUBJECTIVE:     Patient Findings     Comments:   No reason for his High INR. He has seasonal allergies. Not on any antibiotics, Using antihistamines, Flonase,  Expectorants               OBJECTIVE    INR   Date Value Ref Range Status   2019 7.1  Final       ASSESSMENT / PLAN  INR assessment SUPRA    Recheck INR In: 1 DAY    INR Location Outside lab      Anticoagulation Summary  As of 2019    INR goal:   2.0-3.0   TTR:   88.8 % (2.8 y)   INR used for dosin.1! (4/3/2019)   Warfarin maintenance plan:   No maintenance plan   Full warfarin instructions:   : Hold   Plan last modified:   Adam Lo, [facility] (2019)   Next INR check:   2019   Priority:   INR   Target end date:   Indefinite    Indications    Pulmonary embolism with infarction (H) [I26.99] [I26.99]  Long-term (current) use of anticoagulants [Z79.01] [Z79.01]             Anticoagulation Episode Summary     INR check location:       Preferred lab:       Send INR reminders to:   [facility]    Comments:   Okay to go 4 weeks in between INR's per Semaj 18  HIPPA INFO OK to speak with Mother Janae Mittal [phone]  May leave messages on cell phone  pt prefers to be closer to 3 than 2  Contact Ph [phone]      Anticoagulation Care Providers     Provider Role Specialty Phone number    Sinan Lagunas MD Responsible Hematology [phone]            See the Encounter Report to view Anticoagulation Flowsheet and Dosing Calendar (Go to Encounters tab in chart review, and find the Anticoagulation Therapy Visit)    Spoke with Hayden and Dr Sinan Lagunas this morning. No signs or symptoms of bruising or bleeding. No Vit K necessary per Dr Lagunas, will let INR drift down. Hayden has a home INR monitor which we can use to monitor INRs daily if necessary. INR from yesterday was a venous  draw.    Adam Lo, [facility]

## 2019-04-05 ENCOUNTER — ANTICOAGULATION THERAPY VISIT (OUTPATIENT)
Dept: ANTICOAGULATION | Facility: CLINIC | Age: 34
End: 2019-04-05

## 2019-04-05 DIAGNOSIS — I26.99 PULMONARY EMBOLISM WITH INFARCTION (H): ICD-10-CM

## 2019-04-05 LAB — INR PPP: 8 (ref 0.9–1.1)

## 2019-04-05 NOTE — PROGRESS NOTES
ANTICOAGULATION FOLLOW-UP CLINIC VISIT    Patient Name:  Hayden Ramires  Date:  2019  Contact Type:  Telephone    SUBJECTIVE:     Patient Findings     Comments:   Hayden states he has no signs of bleeding--no blood in urine,stool; no abnormal bruising or bleeding;  No headache or abdominal pain.  He understands he needs to be seen in the ER if he develops any signs of bleeding or if he falls.  He will hold warfarin , , and .  He will recheck INR 19.    His allergies are starting to clear up--he is only taking zyrtec daily now.             OBJECTIVE    INR   Date Value Ref Range Status   2019 8.0 (A) 0.9 - 1.1 Final       ASSESSMENT / PLAN  INR assessment SUPRA    Recheck INR In: 3 DAYS    INR Location Home INR      Anticoagulation Summary  As of 2019    INR goal:   2.0-3.0   TTR:   88.6 % (2.8 y)   INR used for dosin.0! (2019)   Warfarin maintenance plan:   No maintenance plan   Full warfarin instructions:   : Hold; : Hold; : Hold   Plan last modified:   Adam Lo, [facility] (2019)   Next INR check:   2019   Priority:   INR   Target end date:   Indefinite    Indications    Pulmonary embolism with infarction (H) [I26.99] [I26.99]  Long-term (current) use of anticoagulants [Z79.01] [Z79.01]             Anticoagulation Episode Summary     INR check location:       Preferred lab:       Send INR reminders to:   [facility]    Comments:   Okay to go 4 weeks in between INR's per Semaj 18  HIPPA INFO OK to speak with Mother Janae or Deanne Mittal [phone]  May leave messages on cell phone  pt prefers to be closer to 3 than 2  Contact Ph [phone]      Anticoagulation Care Providers     Provider Role Specialty Phone number    Sinan Lagunas MD Responsible Hematology [phone]            See the Encounter Report to view Anticoagulation Flowsheet and Dosing Calendar (Go to Encounters tab in chart review, and find the Anticoagulation Therapy  Visit)    Spoke with Hayden.    Hayden states he has no signs of bleeding--no blood in urine,stool; no abnormal bruising or bleeding;  No headache or abdominal pain.  He understands he needs to be seen in the ER if he develops any signs of bleeding or if he falls.  He will hold warfarin 4/5, 4/6, and 4/7.  He will recheck INR 4/8/19.    His allergies are starting to clear up--he is only taking zyrtec daily now.     Salud Bosch RN

## 2019-04-08 ENCOUNTER — ANTICOAGULATION THERAPY VISIT (OUTPATIENT)
Dept: ANTICOAGULATION | Facility: CLINIC | Age: 34
End: 2019-04-08

## 2019-04-08 DIAGNOSIS — I26.99 PULMONARY EMBOLISM WITH INFARCTION (H): ICD-10-CM

## 2019-04-08 LAB — INR PPP: 2.1

## 2019-04-08 NOTE — PROGRESS NOTES
ANTICOAGULATION FOLLOW-UP CLINIC VISIT    Patient Name:  Hayden Ramires  Date:  2019  Contact Type:  Telephone    SUBJECTIVE:     Patient Findings     Comments:   Patient reports some minor nosebleeds over the weekend that would subside after 2-3 minutes.            OBJECTIVE    INR   Date Value Ref Range Status   2019 2.1  Final       ASSESSMENT / PLAN  No question data found.  Anticoagulation Summary  As of 2019    INR goal:   2.0-3.0   TTR:   88.4 % (2.8 y)   INR used for dosin.1 (2019)   Warfarin maintenance plan:   10 mg (5 mg x 2) every Thu; 7.5 mg (5 mg x 1.5) all other days   Full warfarin instructions:   10 mg every Thu; 7.5 mg all other days   Weekly warfarin total:   55 mg   Plan last modified:   Soledad Resendiz RN (2019)   Next INR check:   2019   Priority:   INR   Target end date:   Indefinite    Indications    Pulmonary embolism with infarction (H) [I26.99] [I26.99]  Long-term (current) use of anticoagulants [Z79.01] [Z79.01]             Anticoagulation Episode Summary     INR check location:       Preferred lab:       Send INR reminders to:   [facility]    Comments:   Okay to go 4 weeks in between INR's per Semaj 18  HIPPA INFO OK to speak with Mother Janae Mittal [phone]  May leave messages on cell phone  pt prefers to be closer to 3 than 2  Contact Ph [phone]      Anticoagulation Care Providers     Provider Role Specialty Phone number    Sinan Lagunas MD Responsible Hematology [phone]            See the Encounter Report to view Anticoagulation Flowsheet and Dosing Calendar (Go to Encounters tab in chart review, and find the Anticoagulation Therapy Visit)    Spoke with patient.     Soledad Resendiz RN

## 2019-04-12 ENCOUNTER — ANTICOAGULATION THERAPY VISIT (OUTPATIENT)
Dept: ANTICOAGULATION | Facility: CLINIC | Age: 34
End: 2019-04-12

## 2019-04-12 DIAGNOSIS — I26.99 PULMONARY EMBOLISM WITH INFARCTION (H): ICD-10-CM

## 2019-04-12 LAB — INR PPP: 3.5

## 2019-04-12 NOTE — PROGRESS NOTES
ANTICOAGULATION FOLLOW-UP CLINIC VISIT    Patient Name:  Hayden Ramires  Date:  4/12/2019  Contact Type:  Telephone    SUBJECTIVE:     Patient Findings     Comments:   Spoke to Hayden.  No Problems found. No Changes in Health, Medications, or diet. No Signs of bruising, bleeding or clotting.  Nose bleeds have resolved.  Recommended patient take 7.5mg of Coumadin daily.  Will check in ten days.  Patient has a home monitor.  The result is via fingerstick.           OBJECTIVE    INR   Date Value Ref Range Status   04/12/2019 3.5  Final     Comment:     [facility]       ASSESSMENT / PLAN  INR assessment THER    Recheck INR In: 10 DAYS    INR Location Home INR      Anticoagulation Summary  As of 4/12/2019    INR goal:   2.0-3.0   TTR:   88.3 % (2.8 y)   INR used for dosing:   3.5! (4/12/2019)   Warfarin maintenance plan:   7.5 mg (5 mg x 1.5) every day   Full warfarin instructions:   7.5 mg every day   Weekly warfarin total:   52.5 mg   Plan last modified:   Thierry Garcia RN (4/12/2019)   Next INR check:   4/22/2019   Priority:   INR   Target end date:   Indefinite    Indications    Pulmonary embolism with infarction (H) [I26.99] [I26.99]  Long-term (current) use of anticoagulants [Z79.01] [Z79.01]             Anticoagulation Episode Summary     INR check location:       Preferred lab:       [facility]ers [facility]    Comments:   Okay to go 4 weeks in between INR's per Semaj 6/1/18  HIPPA INFO OK to speak with Mother Janae or Deanne Daxa [phone]  May leave messages on cell phone  pt prefers to be closer to 3 than 2  Contact Ph [phone]      Anticoagulation Care Providers     Provider Role Specialty Phone number    Sinan Lagunas MD Responsible Hematology [phone]            See the Encounter Report to view Anticoagulation Flowsheet and Dosing Calendar (Go to Encounters tab in chart review, and find the Anticoagulation Therapy Visit)    Spoke with patient. Gave them their lab  results and new warfarin recommendation.  No changes in health, medication, or diet. No missed doses, no falls. No signs or symptoms of bleed or clotting.    Thierry Garcia, RN

## 2019-04-22 ENCOUNTER — ANTICOAGULATION THERAPY VISIT (OUTPATIENT)
Dept: ANTICOAGULATION | Facility: CLINIC | Age: 34
End: 2019-04-22

## 2019-04-22 DIAGNOSIS — I26.99 PULMONARY EMBOLISM WITH INFARCTION (H): ICD-10-CM

## 2019-04-22 LAB — INR POINT OF CARE: 3.5 (ref 0.86–1.14)

## 2019-04-22 NOTE — PROGRESS NOTES
ANTICOAGULATION FOLLOW-UP CLINIC VISIT    Patient Name:  Hayden Ramires  Date:  4/22/2019  Contact Type:  Telephone    SUBJECTIVE:     Patient Findings     Comments:   Pt may require one day/wk of a 5 mg dose.           OBJECTIVE    INR Protime   Date Value Ref Range Status   04/22/2019 3.5 (A) 0.86 - 1.14 Final       ASSESSMENT / PLAN  INR assessment SUPRA    Recheck INR In: 1 WEEK    INR Location Home INR      Anticoagulation Summary  As of 4/22/2019    INR goal:   2.0-3.0   TTR:   87.5 % (2.9 y)   INR used for dosing:   3.5! (4/22/2019)   Warfarin maintenance plan:   7.5 mg (5 mg x 1.5) every day   Full warfarin instructions:   4/22: 5 mg; Otherwise 7.5 mg every day   Weekly warfarin total:   52.5 mg   Plan last modified:   Thierry Garcia RN (4/12/2019)   Next INR check:   4/29/2019   Priority:   INR   Target end date:   Indefinite    Indications    Pulmonary embolism with infarction (H) [I26.99] [I26.99]  Long-term (current) use of anticoagulants [Z79.01] [Z79.01]             Anticoagulation Episode Summary     INR check location:       Preferred lab:       Send INR reminders to:   [facility]    Comments:   Okay to go 4 weeks in between INR's per Semaj 6/1/18  HIPPA INFO OK to speak with Mother Janae Mittal [phone]  May leave messages on cell phone  pt prefers to be closer to 3 than 2  Contact Ph [phone]      Anticoagulation Care Providers     Provider Role Specialty Phone number    Sinan Lagunas MD Responsible Hematology [phone]            See the Encounter Report to view Anticoagulation Flowsheet and Dosing Calendar (Go to Encounters tab in chart review, and find the Anticoagulation Therapy Visit)    Spoke with patient. Gave them their lab results and new warfarin recommendation.  No changes in health, medication, or diet. No missed doses, no falls. No signs or symptoms of bleed or clotting.      Sol Sotomayor RN

## 2019-05-08 ENCOUNTER — ANTICOAGULATION THERAPY VISIT (OUTPATIENT)
Dept: ANTICOAGULATION | Facility: CLINIC | Age: 34
End: 2019-05-08

## 2019-05-08 DIAGNOSIS — I26.99 PULMONARY EMBOLISM WITH INFARCTION (H): ICD-10-CM

## 2019-05-08 LAB — INR PPP: 2.1

## 2019-05-08 NOTE — PROGRESS NOTES
ANTICOAGULATION FOLLOW-UP CLINIC VISIT    Patient Name:  Hayden Ramires  Date:  2019  Contact Type:  Telephone    SUBJECTIVE:     Patient Findings     Comments:   Spoke to Hayden.  He prefers to return to 10mg of Coumadin once a week on Thursday, 7.5mg all other days of the week.  Updated calendar.  Will check an INR in two weeks.           OBJECTIVE    INR   Date Value Ref Range Status   2019 2.10  Final     Comment:     [facility]       ASSESSMENT / PLAN  INR assessment THER    Recheck INR In: 2 WEEKS    INR Location Home INR      Anticoagulation Summary  As of 2019    INR goal:   2.0-3.0   TTR:   87.1 % (2.9 y)   INR used for dosin.10 (2019)   Warfarin maintenance plan:   10 mg (5 mg x 2) every Thu; 7.5 mg (5 mg x 1.5) all other days   Full warfarin instructions:   10 mg every Thu; 7.5 mg all other days   Weekly warfarin total:   55 mg   Plan last modified:   Thierry Garcia RN (2019)   Next INR check:   2019   Priority:   INR   Target end date:   Indefinite    Indications    Pulmonary embolism with infarction (H) [I26.99] [I26.99]  Long-term (current) use of anticoagulants [Z79.01] [Z79.01]             Anticoagulation Episode Summary     INR check location:       Preferred lab:       Send INR reminders to:   [facility]    Comments:   Okay to go 4 weeks in between INR's per Semaj 18  HIPPA INFO OK to speak with Mother Janae or Deanne Daxa [phone]  May leave messages on cell phone  pt prefers to be closer to 3 than 2  Contact  [phone]      Anticoagulation Care Providers     Provider Role Specialty Phone number    Sinan Lagunas MD Responsible Hematology [phone]            See the Encounter Report to view Anticoagulation Flowsheet and Dosing Calendar (Go to Encounters tab in chart review, and find the Anticoagulation Therapy Visit)    Spoke with patient. Gave them their lab results and new warfarin recommendation.  No changes in health,  medication, or diet. No missed doses, no falls. No signs or symptoms of bleed or clotting.    Thierry Garcia, RN

## 2019-06-18 ENCOUNTER — ANTICOAGULATION THERAPY VISIT (OUTPATIENT)
Dept: ANTICOAGULATION | Facility: CLINIC | Age: 34
End: 2019-06-18

## 2019-06-18 DIAGNOSIS — I26.99 PULMONARY EMBOLISM WITH INFARCTION (H): ICD-10-CM

## 2019-06-18 DIAGNOSIS — Z79.01 LONG TERM CURRENT USE OF ANTICOAGULANT THERAPY: ICD-10-CM

## 2019-06-18 LAB — INR PPP: 2.4

## 2019-06-18 NOTE — PROGRESS NOTES
ANTICOAGULATION FOLLOW-UP CLINIC VISIT    Patient Name:  Hayden Ramires  Date:  2019  Contact Type:  Telephone    SUBJECTIVE:         OBJECTIVE    INR   Date Value Ref Range Status   2019 2.4  Final     Comment:     Home monitor       ASSESSMENT / PLAN  INR assessment THER    Recheck INR In: 4 WEEKS    INR Location Home INR      Anticoagulation Summary  As of 2019    INR goal:   2.0-3.0   TTR:   87.6 % (3 y)   INR used for dosin.4 (2019)   Warfarin maintenance plan:   10 mg (5 mg x 2) every Thu; 7.5 mg (5 mg x 1.5) all other days   Full warfarin instructions:   10 mg every Thu; 7.5 mg all other days   Weekly warfarin total:   55 mg   No change documented:   Thierry Garcia RN   Plan last modified:   Thierry Garcia RN (2019)   Next INR check:   2019   Priority:   INR   Target end date:   Indefinite    Indications    Pulmonary embolism with infarction (H) [I26.99] [I26.99]  Long-term (current) use of anticoagulants [Z79.01] [Z79.01]             Anticoagulation Episode Summary     INR check location:       Preferred lab:       Send INR reminders to:   [facility]    Comments:   Okay to go 4 weeks in between INR's per Semaj 18  HIPPA INFO OK to speak with Mother Janae or Deanne Mittal [phone]  May leave messages on cell phone  pt prefers to be closer to 3 than 2  Contact Ph [phone]      Anticoagulation Care Providers     Provider Role Specialty Phone number    Sinan Lagunas MD Responsible Hematology [phone]            See the Encounter Report to view Anticoagulation Flowsheet and Dosing Calendar (Go to Encounters tab in chart review, and find the Anticoagulation Therapy Visit)    Left message for patient with results and dosing recommendations. Asked patient to call back to report any missed doses, falls, signs and symptoms of bleeding or clotting, any changes in health, medication, or diet. Asked patient to call back with any questions or  concerns.    Thierry Garcia, RN

## 2019-07-13 DIAGNOSIS — Z79.01 CHRONIC ANTICOAGULATION: ICD-10-CM

## 2019-07-15 DIAGNOSIS — Z79.01 LONG TERM CURRENT USE OF ANTICOAGULANT THERAPY: Primary | ICD-10-CM

## 2019-07-15 RX ORDER — WARFARIN SODIUM 5 MG/1
5 TABLET ORAL DAILY
Qty: 60 TABLET | Refills: 2 | Status: SHIPPED | OUTPATIENT
Start: 2019-07-15 | End: 2022-05-11

## 2019-07-15 RX ORDER — WARFARIN SODIUM 7.5 MG/1
TABLET ORAL
Qty: 78 TABLET | Refills: 5 | OUTPATIENT
Start: 2019-07-15

## 2019-07-15 RX ORDER — WARFARIN SODIUM 7.5 MG/1
7.5 TABLET ORAL DAILY
Qty: 30 TABLET | Refills: 0 | Status: SHIPPED | OUTPATIENT
Start: 2019-07-15 | End: 2019-10-03

## 2019-07-15 NOTE — TELEPHONE ENCOUNTER
Rx for warfarin was entered electronically and sent to [facility] pharmacy in [place].  Patient is due to be seen and has appt with Dr. Lagunas on 10/7/19.  Virginia Martin RN - Nurse Clinician - [facility] - [phone]

## 2019-07-24 ENCOUNTER — ANTICOAGULATION THERAPY VISIT (OUTPATIENT)
Dept: ANTICOAGULATION | Facility: CLINIC | Age: 34
End: 2019-07-24

## 2019-07-24 DIAGNOSIS — Z79.01 LONG TERM CURRENT USE OF ANTICOAGULANT THERAPY: ICD-10-CM

## 2019-07-24 DIAGNOSIS — I26.99 PULMONARY EMBOLISM WITH INFARCTION (H): ICD-10-CM

## 2019-07-24 LAB — INR PPP: 2.3 (ref 0.9–1.1)

## 2019-07-24 NOTE — PROGRESS NOTES
ANTICOAGULATION FOLLOW-UP CLINIC VISIT    Patient Name:  Hayden Ramires  Date:  2019  Contact Type:  Telephone    SUBJECTIVE:         OBJECTIVE    INR   Date Value Ref Range Status   2019 2.3 (A) 0.9 - 1.1 Final       ASSESSMENT / PLAN  INR assessment THER    Recheck INR In: 4 WEEKS    INR Location Home INR      Anticoagulation Summary  As of 2019    INR goal:   2.0-3.0   TTR:   88.0 % (3.1 y)   INR used for dosin.3 (2019)   Warfarin maintenance plan:   10 mg (5 mg x 2) every Thu; 7.5 mg (5 mg x 1.5) all other days   Full warfarin instructions:   10 mg every Thu; 7.5 mg all other days   Weekly warfarin total:   55 mg   No change documented:   Salud Bosch RN   Plan last modified:   Thierry Garcia RN (2019)   Next INR check:   2019   Priority:   INR   Target end date:   Indefinite    Indications    Pulmonary embolism with infarction (H) [I26.99] [I26.99]  Long-term (current) use of anticoagulants [Z79.01] [Z79.01]             Anticoagulation Episode Summary     INR check location:       Preferred lab:       [facility]    Comments:   Okay to go 4 weeks in between INR's per Semaj 18  HIPPA INFO OK to speak with Mother Janae Mittal [phone]  May leave messages on cell phone  pt prefers to be closer to 3 than 2  Contact Ph [phone]      Anticoagulation Care Providers     Provider Role Specialty Phone number    Sinan Lagunas MD Responsible Hematology [phone]            See the Encounter Report to view Anticoagulation Flowsheet and Dosing Calendar (Go to Encounters tab in chart review, and find the Anticoagulation Therapy Visit)      Attempted to call patient, mailbox is full.  Left message on his mom, Janae's phone, asking her to ask Hayden to call the [facility].    aSlud Bosch RN     19 Addendum:  Hayden called back.  He has not had any changes in health, diet, medications.  He will continue on his  maintenance dose of warfarin and recheck INR in 4 weeks.  MAS

## 2019-08-29 ENCOUNTER — ANTICOAGULATION THERAPY VISIT (OUTPATIENT)
Dept: ANTICOAGULATION | Facility: CLINIC | Age: 34
End: 2019-08-29

## 2019-08-29 DIAGNOSIS — I26.99 PULMONARY EMBOLISM WITH INFARCTION (H): ICD-10-CM

## 2019-08-29 DIAGNOSIS — Z79.01 LONG TERM CURRENT USE OF ANTICOAGULANT THERAPY: ICD-10-CM

## 2019-08-29 LAB — INR PPP: 2.3

## 2019-08-29 NOTE — PROGRESS NOTES
ANTICOAGULATION FOLLOW-UP CLINIC VISIT    Patient Name:  Hayden Ramires  Date:  2019  Contact Type:  Telephone    SUBJECTIVE:         OBJECTIVE    INR   Date Value Ref Range Status   2019 2.3  Final       ASSESSMENT / PLAN  INR assessment THER    Recheck INR In: 4 WEEKS    INR Location Home INR      Anticoagulation Summary  As of 2019    INR goal:   2.0-3.0   TTR:   88.4 % (3.2 y)   INR used for dosin.3 (2019)   Warfarin maintenance plan:   10 mg (5 mg x 2) every Thu; 7.5 mg (5 mg x 1.5) all other days   Full warfarin instructions:   10 mg every Thu; 7.5 mg all other days   Weekly warfarin total:   55 mg   No change documented:   Sol Sotomayor RN   Plan last modified:   Thierry Garcia RN (2019)   Next INR check:   2019   Priority:   INR   Target end date:   Indefinite    Indications    Pulmonary embolism with infarction (H) [I26.99] [I26.99]  Long-term (current) use of anticoagulants [Z79.01] [Z79.01]             Anticoagulation Episode Summary     INR check location:       Preferred lab:       [facility]ers [facility]    Comments:   Okay to go 4 weeks in between INR's per Semaj 18  HIPPA INFO OK to speak with Mother Janae or Deanne Mittal [phone]  May leave messages on cell phone  pt prefers to be closer to 3 than 2  Contact Ph [phone]      Anticoagulation Care Providers     Provider Role Specialty Phone number    Sinan Lagunas MD Responsible Hematology [phone]            See the Encounter Report to view Anticoagulation Flowsheet and Dosing Calendar (Go to Encounters tab in chart review, and find the Anticoagulation Therapy Visit)    Spoke with patient. Gave them their lab results and new warfarin recommendation.  No changes in health, medication, or diet. No missed doses, no falls. No signs or symptoms of bleed or clotting.      Sol Sotomayor, RN

## 2019-09-29 ENCOUNTER — HEALTH MAINTENANCE LETTER (OUTPATIENT)
Age: 34
End: 2019-09-29

## 2019-10-03 ENCOUNTER — ANTICOAGULATION THERAPY VISIT (OUTPATIENT)
Dept: ANTICOAGULATION | Facility: CLINIC | Age: 34
End: 2019-10-03

## 2019-10-03 DIAGNOSIS — Z79.01 LONG TERM CURRENT USE OF ANTICOAGULANT THERAPY: ICD-10-CM

## 2019-10-03 DIAGNOSIS — I26.99 PULMONARY EMBOLISM WITH INFARCTION (H): ICD-10-CM

## 2019-10-03 LAB — INR PPP: 2.5

## 2019-10-03 RX ORDER — WARFARIN SODIUM 7.5 MG/1
TABLET ORAL
Qty: 30 TABLET | Refills: 0 | Status: SHIPPED | OUTPATIENT
Start: 2019-10-03 | End: 2019-11-05

## 2019-10-03 NOTE — PROGRESS NOTES
ANTICOAGULATION FOLLOW-UP CLINIC VISIT    Patient Name:  Hayden Ramires  Date:  10/3/2019  Contact Type:  Telephone    SUBJECTIVE:         OBJECTIVE    INR   Date Value Ref Range Status   10/03/2019 2.5  Final       ASSESSMENT / PLAN  INR assessment THER    Recheck INR In: 4 WEEKS    INR Location Home INR      Anticoagulation Summary  As of 10/3/2019    INR goal:   2.0-3.0   TTR:   88.7 % (3.3 y)   INR used for dosin.5 (10/3/2019)   Warfarin maintenance plan:   10 mg (5 mg x 2) every Thu; 7.5 mg (5 mg x 1.5) all other days   Full warfarin instructions:   10 mg every Thu; 7.5 mg all other days   Weekly warfarin total:   55 mg   Plan last modified:   Thierry Garcia RN (2019)   Next INR check:   10/31/2019   Priority:   INR   Target end date:   Indefinite    Indications    Pulmonary embolism with infarction (H) [I26.99] [I26.99]  Long-term (current) use of anticoagulants [Z79.01] [Z79.01]             Anticoagulation Episode Summary     INR check location:       Preferred lab:       Send [facility]ers [facility]:   [facility]    Comments:   Okay to go 4 weeks in between INR's per Semaj 18  HIPPA INFO OK to speak with Mother Janae or Deanne Mittal [phone]  May leave messages on cell phone  pt prefers to be closer to 3 than 2  Contact Ph [phone]      Anticoagulation Care Providers     Provider Role Specialty Phone number    Sinan Lagunas MD Responsible Hematology [phone]            See the Encounter Report to view Anticoagulation Flowsheet and Dosing Calendar (Go to Encounters tab in chart review, and find the Anticoagulation Therapy Visit)    Left message for patient with results and dosing recommendations. Asked patient to call back to report any missed doses, falls, signs and symptoms of bleeding or clotting, any changes in health, medication, or diet. Asked patient to call back with any questions or concerns.      Adam Lo [facility]

## 2019-10-06 NOTE — PROGRESS NOTES
[facility]  [facility]  [facility] [address], [place] [address]  Main: [phone], Fax: [phone]        NAME: Hayden Ramires ANABEL: Oct 7, 2019   MRN: [id number]      Hematology History (copied and pasted from the previous clinic note)  Mr. Hayden Ramires is a 34 year old male with a personal history of antithrombin deficiency and PE in 2012 and a family history of antithrombin deficiency.     He reports that his brother was first diagnosed with clots at age 21 with DVT/PE.  Then his dad was diagnosed with a PE at age 47 while he was bed-bound with a pneumonia.  His dad was found to have AT deficiency with a level of 53%.  His dad reportedly saw Dr. Harry (Vascular Surgery) at the [facility] and saw a genetic counselor in 2000.  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  He developed some respiratory symptoms, but mostly severe back pain.  He went to the hospital and was found to have a pulmonary embolism in 4/2012.  On 4/5/12, LE Dopplers of lower extremities showed no DVTs.  He was also found to have AT deficiency with a level of 52%.  He was started on warfarin at that time and has remained on it.  However, he has had issues with a very labile INR (ranging from 1.5-6.1).  He is concerned about the inconsistency. He had tried to arrange for home INR monitoring, but apparently that could not be done via his INR clinic.        Interval history:  Mr. Ramires is known AT deficiency and hx of PE in 2012, currently on warfarin and returns to the clinic for routine follow up. He was seen last in the clinic in 12/2015. At that time, he decided to stay on warfarin with home INR monitoring (once a month).  Overall, his INR has been very well controlled.  He did have a single episode in April 2019 where he became significantly supratherapeutic with an INR of 8.  This was in conjunction with an upper respiratory  "infection.  Otherwise his INR has remained very stable in the 2-3 range.    Today, he is doing well.  Denies any specific concerns.  He had no bleeding issues.    ROS: Negative other than as stated in above interval history.      Current Outpatient Medications   Medication     cetirizine (ZYRTEC) 10 MG tablet     multivitamin, therapeutic (THERA-VIT) TABS     Omega-3 Fatty Acids (OMEGA-3 FISH OIL PO)     SERTRALINE HCL PO     warfarin (COUMADIN) 10 MG tablet     warfarin (COUMADIN) 5 MG tablet     warfarin (COUMADIN) 7.5 MG tablet     warfarin ANTICOAGULANT (COUMADIN) 7.5 MG tablet     warfarin ANTICOAGULANT (COUMADIN) 7.5 MG tablet     WARFARIN SODIUM PO     No current facility-administered medications for this visit.         Physical Exam:  /79 (BP Location: Right arm, Patient Position: Sitting, Cuff Size: Adult Regular)   Pulse 68   Temp 98  F (36.7  C) (Oral)   Resp 12   Ht 1.867 m (6' 1.5\")   Wt 83.9 kg (185 lb)   SpO2 98%   BMI 24.08 kg/m      General:  no acute distress. Comfortably sitting in the chair  HEENT: NCAT. anicteric sclera.   Respiratory: Non-labored breathing, good air exchange, lungs clear to auscultation bilaterally.  Cardiovascular: Regular rate and rhythm. No murmur or rub.   GI: Normoactive bowel sounds. Abdomen soft, non-distended, and non-tender.   Extremities: no edema. Good strength and ROM.  Neuro: AAOx3, grossly non-focal   Psych: appropriated mood    Laboratory findings:     Ref. Range 1/21/2019 00:00 1/25/2019 00:00 2/26/2019 00:00 4/2/2019 00:00 4/3/2019 00:00 4/5/2019 00:00 4/8/2019 00:00 4/12/2019 00:00 4/22/2019 00:00 5/8/2019 00:00 6/18/2019 00:00 7/24/2019 00:00 8/29/2019 00:00 10/3/2019 00:00   INR Unknown 2.2 2.1 2.4 6.2 (A) 7.1 8.0 (A) 2.1 3.5 3.5 (A) 2.10 2.4 2.3 (A) 2.3 2.5       Assessment / Plan:   # Unprovoked PE w/ antithrombin deficiency, on warfarin  # Family history of antithrombin deficiency    Hayden Ramires is a 34 year old male with a personal " history of antithrombin deficiency and PE in 2012 and a family history of antithrombin deficiency who returns to the clinic for follow up of long term anticoagulation with warfarin. He has been doing fine without any bleeding or recurrent clots. His INR has been well controlled with a one episode of supra-therapeutic INR but no bleeding.    Given a good tolerance and stable INR, we recommended to continue warfarin at this point. Previously, we discussed use of DOACs but decided not to pursue them particularly due to a lack of evidence of DOACs in the setting of antithrombin deficiency.     Regarding his son, his family history suggested a strong penetration of AT deficiency, which is an autosomal dominant condition. Earliest family member affected was his brother at the age of 21. Therefore, we recommended to have him tested in his early teenage years. Despite absence of data to support prophylaxis, we may consider therapeutic warfarin in that case based on strong family history.     Patient was seen, care plan discussed, and staffed with Dr. Lagunas.    Se ajay Huston MD  [facility]  Hematology Oncology and Transplantation Fellow  Pager: [phone]         HEMATOLOGY STAFF:  Seen with fellow, whose note reflects our joint evaluation, assessment, and plan.    Hayden is a 34-year-old male with:  1.  Unprovoked pulmonary embolism in 2012.  2.  Antithrombin deficiency.  3.  Family history of Antithrombin deficiency.    He is currently maintained on long-term anticoagulantion with warfarin which is going well.  He is using a home INR monitor.  In general, his INR has been very well controlled and he has had no bleeding issues.    We discussed continuing with warfarin versus considering a direct oral anticoagulant.  After discussing the pros and cons of each option, we decided together that staying with warfarin with the best fit for him.  We should continue to see him back annually to review his  anticoagulation plan.    Total time 25 minutes, all in counseling and coordination of care.      Sinan Lagunas MD  Associate Professor of Medicine  Division of Hematology, Oncology, and Transplantation  Director, [facility]

## 2019-10-07 ENCOUNTER — OFFICE VISIT (OUTPATIENT)
Dept: HEMATOLOGY | Facility: CLINIC | Age: 34
End: 2019-10-07
Attending: INTERNAL MEDICINE
Payer: COMMERCIAL

## 2019-10-07 VITALS
TEMPERATURE: 98 F | DIASTOLIC BLOOD PRESSURE: 79 MMHG | HEART RATE: 68 BPM | RESPIRATION RATE: 12 BRPM | HEIGHT: 74 IN | SYSTOLIC BLOOD PRESSURE: 128 MMHG | BODY MASS INDEX: 23.74 KG/M2 | OXYGEN SATURATION: 98 % | WEIGHT: 185 LBS

## 2019-10-07 DIAGNOSIS — Z79.01 LONG TERM CURRENT USE OF ANTICOAGULANT THERAPY: ICD-10-CM

## 2019-10-07 DIAGNOSIS — Z86.711 HISTORY OF PULMONARY EMBOLISM: Primary | ICD-10-CM

## 2019-10-07 DIAGNOSIS — D68.59 ANTITHROMBIN DEFICIENCY (H): ICD-10-CM

## 2019-10-07 PROCEDURE — G0463 HOSPITAL OUTPT CLINIC VISIT: HCPCS

## 2019-10-07 PROCEDURE — 99214 OFFICE O/P EST MOD 30 MIN: CPT | Mod: GC | Performed by: INTERNAL MEDICINE

## 2019-10-07 ASSESSMENT — PAIN SCALES - GENERAL: PAINLEVEL: NO PAIN (0)

## 2019-10-07 ASSESSMENT — MIFFLIN-ST. JEOR: SCORE: 1835.96

## 2019-10-07 NOTE — PATIENT INSTRUCTIONS
1. Please continue on your warfarin as advised by the medication monitoring clinic.   2. Please call us with any planned surgeries or procedures so we can help guide your anticoagulation.   3. The two drugs that Dr. Lagunas mentioned are Thrombate (derived from plasma) and Atryn (synthetic).  4. Please let us know if you have any additional questions, comments or concerns.    Main: [phone]    For emergency treatment recommendations:  Outside providers call [facility] at:  [phone] (ask for hematologist on call)  : Dr. Sinan Tinoco, RN - Nurse Clinician - [facility] - [phone]

## 2019-10-08 NOTE — PROGRESS NOTES
Hayden Ramires is here for an annual visit with Dr. Lagunas. Hayden is being seen for antithrombin deficiency and PE in 2012.    After patient was seen by Dr. Lagunas, we reviewed the plan that he would continue to take his warfarin as directed by the medication monitoring clinic, we will have him return in 1 year.     The AVS instructions were then updated and given to the patient. RN then thanked Hayden for coming and encouraged him to call should he have any questions or concerns.    Tomasa Tinoco RN - Nurse Clinician - [facility] - [phone]

## 2019-11-05 RX ORDER — WARFARIN SODIUM 7.5 MG/1
7.5 TABLET ORAL DAILY
Qty: 30 TABLET | Refills: 5 | Status: SHIPPED | OUTPATIENT
Start: 2019-11-05 | End: 2020-08-06

## 2019-11-05 NOTE — PROGRESS NOTES
Addendum 11/5/19 Refill for 7.5mg tablets sent to [facility] in [place], [place]. Ana Wong RN

## 2019-11-06 ENCOUNTER — ANTICOAGULATION THERAPY VISIT (OUTPATIENT)
Dept: ANTICOAGULATION | Facility: CLINIC | Age: 34
End: 2019-11-06

## 2019-11-06 DIAGNOSIS — I26.99 PULMONARY EMBOLISM WITH INFARCTION (H): ICD-10-CM

## 2019-11-06 DIAGNOSIS — Z79.01 LONG TERM CURRENT USE OF ANTICOAGULANT THERAPY: ICD-10-CM

## 2019-11-06 LAB — INR PPP: 2.1

## 2019-11-06 NOTE — PROGRESS NOTES
ANTICOAGULATION FOLLOW-UP CLINIC VISIT    Patient Name:  Hayden Ramires  Date:  2019  Contact Type:  Telephone    SUBJECTIVE:  Patient Findings     Comments:   Patient requests 3month supply of Warfarin tablets.  This writer spoke to [employer], 90 tablets are issued to patient today.        Clinical Outcomes     Comments:   Patient requests 3month supply of Warfarin tablets.  This writer spoke to [employer], 90 tablets are issued to patient today.           OBJECTIVE    INR   Date Value Ref Range Status   2019 2.1  Final     Comment:     Home monitor       ASSESSMENT / PLAN  INR assessment THER    Recheck INR In: 4 WEEKS    INR Location Home INR      Anticoagulation Summary  As of 2019    INR goal:   2.0-3.0   TTR:   89.0 % (3.4 y)   INR used for dosin.1 (2019)   Warfarin maintenance plan:   10 mg (5 mg x 2) every Thu; 7.5 mg (5 mg x 1.5) all other days   Full warfarin instructions:   10 mg every Thu; 7.5 mg all other days   Weekly warfarin total:   55 mg   No change documented:   Thierry Garcia RN   Plan last modified:   Thierry Garcia RN (2019)   Next INR check:   2019   Priority:   INR   Target end date:   Indefinite    Indications    Pulmonary embolism with infarction (H) [I26.99] [I26.99]  Long-term (current) use of anticoagulants [Z79.01] [Z79.01]             Anticoagulation Episode Summary     INR check location:       Preferred lab:       Send INR reminders to:   [facility]    Comments:   Okay to go 4 weeks in between INR's per Semaj 18  HIPPA INFO OK to speak with Mother Janae or Deanne Daxa [phone]  May leave messages on cell phone  pt prefers to be closer to 3 than 2  Contact Ph [phone]      Anticoagulation Care Providers     Provider Role Specialty Phone number    Sinan Lagunas MD Responsible Hematology [phone]            See the Encounter Report to view Anticoagulation Flowsheet and Dosing Calendar (Go to Encounters tab in chart  review, and find the Anticoagulation Therapy Visit)    INR/CFX/F2 RESULT: INR is 2.1 per home monitor    ASSESSMENT:Spoke with patient. Gave them their lab results and new warfarin recommendation.  No changes in health, medication, or diet. No missed doses, no falls. No signs or symptoms of bleed or clotting.    DOSING ADJUSTMENT: continue current maintenance dose    NEXT INR/FACTOR X OR FACTOR II: 12/4    PROTOCOL FOLLOWED:goal 2-3    Thierry Garcia RN

## 2019-12-13 ENCOUNTER — ANTICOAGULATION THERAPY VISIT (OUTPATIENT)
Dept: ANTICOAGULATION | Facility: CLINIC | Age: 34
End: 2019-12-13

## 2019-12-13 DIAGNOSIS — I26.99 PULMONARY EMBOLISM WITH INFARCTION (H): ICD-10-CM

## 2019-12-13 DIAGNOSIS — Z79.01 LONG TERM CURRENT USE OF ANTICOAGULANT THERAPY: ICD-10-CM

## 2019-12-13 LAB — INR PPP: 2.1 (ref 0.9–1.1)

## 2019-12-13 NOTE — PROGRESS NOTES
ANTICOAGULATION FOLLOW-UP CLINIC VISIT    Patient Name:  Hayden Ramires  Date:  2019  Contact Type:  Telephone    SUBJECTIVE:  Patient Findings         Clinical Outcomes     Negatives:   Major bleeding event, Thromboembolic event, Anticoagulation-related hospital admission, Anticoagulation-related ED visit, Anticoagulation-related fatality           OBJECTIVE    INR   Date Value Ref Range Status   2019 2.1 (A) 0.9 - 1.1 Final     Comment:     Home Monitoring Machine        ASSESSMENT / PLAN  INR assessment THER    Recheck INR In: 4 WEEKS    INR Location Home INR      Anticoagulation Summary  As of 2019    INR goal:   2.0-3.0   TTR:   85.7 % (1 y)   INR used for dosin.1 (2019)   Warfarin maintenance plan:   10 mg (5 mg x 2) every Thu; 7.5 mg (5 mg x 1.5) all other days   Full warfarin instructions:   10 mg every Thu; 7.5 mg all other days   Weekly warfarin total:   55 mg   Plan last modified:   Thierry Garcia RN (2019)   Next INR check:   1/10/2020   Priority:   Maintenance   Target end date:   Indefinite    Indications    Pulmonary embolism with infarction (H) [I26.99] [I26.99]  Long-term (current) use of anticoagulants [Z79.01] [Z79.01]             Anticoagulation Episode Summary     INR check location:       Preferred lab:       Send INR reminders to:   [facility]    Comments:   Okay to go 4 weeks in between INR's per Semaj 18  HIPPA INFO OK to speak with Mother Janae or Deanne Daxa [phone]  May leave messages on cell phone  pt prefers to be closer to 3 than 2  Contact  [phone]      Anticoagulation Care Providers     Provider Role Specialty Phone number    Sinan Lagunas MD Responsible Hematology [phone]            See the Encounter Report to view Anticoagulation Flowsheet and Dosing Calendar (Go to Encounters tab in chart review, and find the Anticoagulation Therapy Visit)    Spoke with patient. Gave them their lab results and new  warfarin recommendation.  No changes in health, medication, or diet. No missed doses, no falls. No signs or symptoms of bleed or clotting.     Ana Wong RN

## 2020-01-20 ENCOUNTER — ANTICOAGULATION THERAPY VISIT (OUTPATIENT)
Dept: ANTICOAGULATION | Facility: CLINIC | Age: 35
End: 2020-01-20

## 2020-01-20 DIAGNOSIS — I26.99 PULMONARY EMBOLISM WITH INFARCTION (H): ICD-10-CM

## 2020-01-20 DIAGNOSIS — Z79.01 LONG TERM CURRENT USE OF ANTICOAGULANT THERAPY: ICD-10-CM

## 2020-01-20 LAB — INR PPP: 2.3 (ref 0.9–1.1)

## 2020-01-20 NOTE — PROGRESS NOTES
ANTICOAGULATION FOLLOW-UP CLINIC VISIT    Patient Name:  Hayden Ramires  Date:  2020  Contact Type:  Telephone    SUBJECTIVE:         OBJECTIVE    INR   Date Value Ref Range Status   2020 2.3 (A) 0.90 - 1.10 Final       ASSESSMENT / PLAN  INR assessment THER    Recheck INR In: 4 WEEKS    INR Location Home INR      Anticoagulation Summary  As of 2020    INR goal:   2.0-3.0   TTR:   85.7 % (1 y)   INR used for dosin.3 (2020)   Warfarin maintenance plan:   10 mg (5 mg x 2) every Thu; 7.5 mg (5 mg x 1.5) all other days   Full warfarin instructions:   10 mg every Thu; 7.5 mg all other days   Weekly warfarin total:   55 mg   No change documented:   Sol Sotomayor RN   Plan last modified:   Thierry Garcia RN (2019)   Next INR check:   2020   Priority:   Maintenance   Target end date:   Indefinite    Indications    Pulmonary embolism with infarction (H) [I26.99] [I26.99]  Long-term (current) use of anticoagulants [Z79.01] [Z79.01]             Anticoagulation Episode Summary     INR check location:       Preferred lab:       Send INR reminders to:   [facility]    Comments:   Okay to go 4 weeks in between INR's per Semaj 18  HIPPA INFO OK to speak with Mother Janae Mittal [phone]  May leave messages on cell phone  pt prefers to be closer to 3 than 2  Contact Ph [phone]      Anticoagulation Care Providers     Provider Role Specialty Phone number    Sinan Lagunas MD Responsible Hematology [phone]            See the Encounter Report to view Anticoagulation Flowsheet and Dosing Calendar (Go to Encounters tab in chart review, and find the Anticoagulation Therapy Visit)    Spoke with patient. Gave them their lab results and new warfarin recommendation.  No changes in health, medication, or diet. No missed doses, no falls. No signs or symptoms of bleed or clotting.      Sol Sotomayor, RN

## 2020-02-24 ENCOUNTER — ANTICOAGULATION THERAPY VISIT (OUTPATIENT)
Dept: ANTICOAGULATION | Facility: CLINIC | Age: 35
End: 2020-02-24

## 2020-02-24 DIAGNOSIS — Z79.01 LONG TERM CURRENT USE OF ANTICOAGULANT THERAPY: ICD-10-CM

## 2020-02-24 DIAGNOSIS — I26.99 PULMONARY EMBOLISM WITH INFARCTION (H): ICD-10-CM

## 2020-02-24 LAB — INR PPP: 2.4 (ref 0.9–1.1)

## 2020-02-24 NOTE — PROGRESS NOTES
ANTICOAGULATION FOLLOW-UP CLINIC VISIT    Patient Name:  Hayden Ramires  Date:  2020  Contact Type:  Telephone    SUBJECTIVE:  Patient Findings         Clinical Outcomes     Negatives:   Major bleeding event, Thromboembolic event, Anticoagulation-related hospital admission, Anticoagulation-related ED visit, Anticoagulation-related fatality           OBJECTIVE    INR   Date Value Ref Range Status   2020 2.4 (A) 0.90 - 1.10 Final     Comment:     Home Monitoring Machine        ASSESSMENT / PLAN  INR assessment THER    Recheck INR In: 4 WEEKS    INR Location Home INR      Anticoagulation Summary  As of 2020    INR goal:   2.0-3.0   TTR:   85.7 % (1 y)   INR used for dosin.4 (2020)   Warfarin maintenance plan:   10 mg (5 mg x 2) every Thu; 7.5 mg (5 mg x 1.5) all other days   Full warfarin instructions:   10 mg every Thu; 7.5 mg all other days   Weekly warfarin total:   55 mg   Plan last modified:   Thierry Garcia RN (2019)   Next INR check:   3/23/2020   Priority:   Maintenance   Target end date:   Indefinite    Indications    Pulmonary embolism with infarction (H) [I26.99] [I26.99]  Long-term (current) use of anticoagulants [Z79.01] [Z79.01]             Anticoagulation Episode Summary     INR check location:       Preferred lab:       Send INR reminders to:   [facility]    Comments:   Okay to go 4 weeks in between INR's per Semaj 18  HIPPA INFO OK to speak with Mother Janae or Deanne Daxa [phone]  May leave messages on cell phone  pt prefers to be closer to 3 than 2  Contact  [phone]      Anticoagulation Care Providers     Provider Role Specialty Phone number    Sinan Lagunas MD Referring Hematology [phone]            See the Encounter Report to view Anticoagulation Flowsheet and Dosing Calendar (Go to Encounters tab in chart review, and find the Anticoagulation Therapy Visit)    Spoke with patient. Gave them their lab results and new  warfarin recommendation.  No changes in health, medication, or diet. No missed doses, no falls. No signs or symptoms of bleed or clotting.     Ana Wong RN

## 2020-04-13 ENCOUNTER — ANTICOAGULATION THERAPY VISIT (OUTPATIENT)
Dept: ANTICOAGULATION | Facility: CLINIC | Age: 35
End: 2020-04-13

## 2020-04-13 DIAGNOSIS — I26.99 PULMONARY EMBOLISM WITH INFARCTION (H): ICD-10-CM

## 2020-04-13 DIAGNOSIS — Z79.01 LONG TERM CURRENT USE OF ANTICOAGULANT THERAPY: ICD-10-CM

## 2020-04-13 LAB — INR PPP: 2.5 (ref 0.9–1.1)

## 2020-04-13 NOTE — PROGRESS NOTES
ANTICOAGULATION FOLLOW-UP CLINIC VISIT    Patient Name:  Hayden Ramires  Date:  2020  Contact Type:  Telephone    SUBJECTIVE:  Patient Findings         Clinical Outcomes     Negatives:   Major bleeding event, Thromboembolic event, Anticoagulation-related hospital admission, Anticoagulation-related ED visit, Anticoagulation-related fatality           OBJECTIVE    INR   Date Value Ref Range Status   2020 2.5 (A) 0.90 - 1.10 Final     Comment:     Home Monitoring Machine        ASSESSMENT / PLAN  INR assessment THER    Recheck INR In: 4 WEEKS    INR Location Home INR      Anticoagulation Summary  As of 2020    INR goal:   2.0-3.0   TTR:   96.2 % (1 y)   INR used for dosin.5 (2020)   Warfarin maintenance plan:   10 mg (5 mg x 2) every Thu; 7.5 mg (5 mg x 1.5) all other days   Full warfarin instructions:   10 mg every Thu; 7.5 mg all other days   Weekly warfarin total:   55 mg   No change documented:   Ana Wong RN   Plan last modified:   Thierry Garcia RN (2019)   Next INR check:   2020   Priority:   Maintenance   Target end date:   Indefinite    Indications    Pulmonary embolism with infarction (H) [I26.99] [I26.99]  Long-term (current) use of anticoagulants [Z79.01] [Z79.01]             Anticoagulation Episode Summary     INR check location:       Preferred lab:       Send [facility]R reminders to:   [facility]    Comments:   Okay to go 4 weeks in between INR's per Semaj 18  HIPPA INFO OK to speak with Mother Janae or Deanne Daxa [phone]  May leave messages on cell phone  pt prefers to be closer to 3 than 2  Contact  [phone]      Anticoagulation Care Providers     Provider Role Specialty Phone number    Sinan Lagunas MD Referring Hematology [phone]            See the Encounter Report to view Anticoagulation Flowsheet and Dosing Calendar (Go to Encounters tab in chart review, and find the Anticoagulation Therapy Visit)    Spoke with  patient. Gave them their lab results and new warfarin recommendation.  No changes in health, medication, or diet. No missed doses, no falls. No signs or symptoms of bleed or clotting.     Ana Wong RN

## 2020-05-21 ENCOUNTER — ANTICOAGULATION THERAPY VISIT (OUTPATIENT)
Dept: ANTICOAGULATION | Facility: CLINIC | Age: 35
End: 2020-05-21

## 2020-05-21 DIAGNOSIS — Z79.01 LONG TERM CURRENT USE OF ANTICOAGULANT THERAPY: ICD-10-CM

## 2020-05-21 DIAGNOSIS — I26.99 PULMONARY EMBOLISM WITH INFARCTION (H): ICD-10-CM

## 2020-05-21 LAB — INR PPP: 2.6 (ref 0.9–1.1)

## 2020-05-21 NOTE — PROGRESS NOTES
ANTICOAGULATION FOLLOW-UP CLINIC VISIT    Patient Name:  Hayden Ramires  Date:  2020  Contact Type:  Telephone    SUBJECTIVE:         OBJECTIVE    Recent labs: (last 7 days)     20   INR 2.6*       ASSESSMENT / PLAN  No question data found.  Anticoagulation Summary  As of 2020    INR goal:   2.0-3.0   TTR:   100.0 % (1 y)   INR used for dosin.6 (2020)   Warfarin maintenance plan:   10 mg (5 mg x 2) every Thu; 7.5 mg (5 mg x 1.5) all other days   Full warfarin instructions:   10 mg every Thu; 7.5 mg all other days   Weekly warfarin total:   55 mg   No change documented:   Soledad Resendiz RN   Plan last modified:   Thierry Garcia RN (2019)   Next INR check:   2020   Priority:   Maintenance   Target end date:   Indefinite    Indications    Pulmonary embolism with infarction (H) [I26.99] [I26.99]  Long-term (current) use of anticoagulants [Z79.01] [Z79.01]             Anticoagulation Episode Summary     INR check location:       Preferred lab:       Send INR reminders to:   [facility]    Comments:   Okay to go 4 weeks in between INR's per Semaj 18  HIPPA INFO OK to speak with Mother Janae Mittal [phone]  May leave messages on cell phone  pt prefers to be closer to 3 than 2  Contact Ph [phone]      Anticoagulation Care Providers     Provider Role Specialty Phone number    Sinan Lagunas MD Referring Hematology [phone]            See the Encounter Report to view Anticoagulation Flowsheet and Dosing Calendar (Go to Encounters tab in chart review, and find the Anticoagulation Therapy Visit)    Spoke with patient.     Soledad Resendiz, RN

## 2020-06-26 LAB — INR PPP: 2.2 (ref 0.9–1.1)

## 2020-06-29 ENCOUNTER — ANTICOAGULATION THERAPY VISIT (OUTPATIENT)
Dept: ANTICOAGULATION | Facility: CLINIC | Age: 35
End: 2020-06-29

## 2020-06-29 DIAGNOSIS — I26.99 PULMONARY EMBOLISM WITH INFARCTION (H): ICD-10-CM

## 2020-06-29 DIAGNOSIS — Z79.01 LONG TERM CURRENT USE OF ANTICOAGULANT THERAPY: ICD-10-CM

## 2020-06-29 NOTE — PROGRESS NOTES
ANTICOAGULATION FOLLOW-UP CLINIC VISIT    Patient Name:  Hayden Ramires  Date:  2020  Contact Type:  Telephone    SUBJECTIVE:         OBJECTIVE    Recent labs: (last 7 days)     20   INR 2.2*       ASSESSMENT / PLAN  INR assessment THER    Recheck INR In: 4 WEEKS    INR Location Home INR      Anticoagulation Summary  As of 2020    INR goal:   2.0-3.0   TTR:   100.0 % (1 y)   INR used for dosin.2 (2020)   Warfarin maintenance plan:   10 mg (5 mg x 2) every Thu; 7.5 mg (5 mg x 1.5) all other days   Full warfarin instructions:   10 mg every Thu; 7.5 mg all other days   Weekly warfarin total:   55 mg   No change documented:   Ana Wong, RN   Plan last modified:   Thierry Garcia RN (2019)   Next INR check:   2020   Priority:   Maintenance   Target end date:   Indefinite    Indications    Pulmonary embolism with infarction (H) [I26.99] [I26.99]  Long-term (current) use of anticoagulants [Z79.01] [Z79.01]             Anticoagulation Episode Summary     INR check location:       Preferred lab:       Send INR reminders to:   [facility]    Comments:   Okay to go 4 weeks in between INR's per Semaj 18  HIPPA INFO OK to speak with Mother Janae or Deanne Mittal [phone]  May leave messages on cell phone  pt prefers to be closer to 3 than 2  Contact Ph [phone]      Anticoagulation Care Providers     Provider Role Specialty Phone number    Sinan Lagunas MD Referring Hematology [phone]            See the Encounter Report to view Anticoagulation Flowsheet and Dosing Calendar (Go to Encounters tab in chart review, and find the Anticoagulation Therapy Visit)    Left message for patient with results and dosing recommendations. Asked patient to call back to report any missed doses, falls, signs and symptoms of bleeding or clotting, any changes in health, medication, or diet. Asked patient to call back with any questions or concerns.     Ana Wong  RN

## 2020-07-09 ENCOUNTER — TELEPHONE (OUTPATIENT)
Dept: HEMATOLOGY | Facility: CLINIC | Age: 35
End: 2020-07-09

## 2020-07-09 NOTE — TELEPHONE ENCOUNTER
Hayden Ramires  [id number]  1985    Hayden Ramires called today.  He had a fall from standing out in his pole barn on Monday.  He fell on his left hip. There is a small bump on his hip area but no bruising/swelling.  He then developed some calf pain.  It is better with walking and rates the pain at a level of 3 out of 10.  He is on long-term Warfarin and his last INR 6/26 was 2.2  He has a history of DVT & PE.  He has no symptoms of PE and no swelling or redness or bruising in left leg.    Discussed with Dr. Lagunas who would like him to go the the ER to be evaluated with US as well as in INR.  He thinks with his recent fall that his d.dimer will be positive.  He lives up in [place] and plans to go to [place] [facility] later this afternoon.  He was told to go urgently if any symptoms of PE.    He will follow up with me when results are available.  RAFAEL Rios, RN, PHN -Nurse Clinician, [facility] [phone]      7/10/20 Update:  Patient had US done yesterday which was negative for DVT.  His INR was 2.5.  He is still having slight symptoms, but thinks now it may be muscular.  He was told to return to the ER if symptoms should worsen.  Beena Silva, MSN, RN, PHN -Nurse Clinician, [facility] [phone]

## 2020-08-04 ENCOUNTER — ANTICOAGULATION THERAPY VISIT (OUTPATIENT)
Dept: ANTICOAGULATION | Facility: CLINIC | Age: 35
End: 2020-08-04

## 2020-08-04 DIAGNOSIS — I26.99 PULMONARY EMBOLISM WITH INFARCTION (H): ICD-10-CM

## 2020-08-04 DIAGNOSIS — Z86.711 HISTORY OF PULMONARY EMBOLISM: Primary | ICD-10-CM

## 2020-08-04 DIAGNOSIS — Z79.01 LONG TERM CURRENT USE OF ANTICOAGULANT THERAPY: ICD-10-CM

## 2020-08-04 LAB
INR PPP: 2 (ref 0.9–1.1)
INR PPP: 2.5 (ref 0.9–1.1)

## 2020-08-04 RX ORDER — WARFARIN SODIUM 5 MG/1
TABLET ORAL
Qty: 60 TABLET | Refills: 5 | Status: SHIPPED | OUTPATIENT
Start: 2020-08-04 | End: 2020-10-23

## 2020-08-04 NOTE — PROGRESS NOTES
ANTICOAGULATION FOLLOW-UP CLINIC VISIT    Patient Name:  Hayden Ramires  Date:  2020  Contact Type:  Telephone    SUBJECTIVE:  Patient Findings         Clinical Outcomes     Negatives:   Major bleeding event, Thromboembolic event, Anticoagulation-related hospital admission, Anticoagulation-related ED visit, Anticoagulation-related fatality           OBJECTIVE    Recent labs: (last 7 days)     20   INR 2.0*       ASSESSMENT / PLAN  INR assessment THER    Recheck INR In: 4 WEEKS    INR Location Clinic      Anticoagulation Summary  As of 2020    INR goal:   2.0-3.0   TTR:   100.0 % (1 y)   INR used for dosin.0 (2020)   Warfarin maintenance plan:   10 mg (5 mg x 2) every Thu; 7.5 mg (5 mg x 1.5) all other days   Full warfarin instructions:   10 mg every Thu; 7.5 mg all other days   Weekly warfarin total:   55 mg   Plan last modified:   Thierry Garcia RN (2019)   Next INR check:   2020   Priority:   Maintenance   Target end date:   Indefinite    Indications    Pulmonary embolism with infarction (H) [I26.99] [I26.99]  Long-term (current) use of anticoagulants [Z79.01] [Z79.01]             Anticoagulation Episode Summary     INR check location:       Preferred lab:       Send INR reminders to:   [facility]    Comments:   Okay to go 4 weeks in between INR's per Semaj 18  HIPPA INFO OK to speak with Mother Janae Mittal [phone]  May leave messages on cell phone  pt prefers to be closer to 3 than 2  Contact Ph [phone]      Anticoagulation Care Providers     Provider Role Specialty Phone number    Sinan Lagunas MD Referring Hematology [phone]            See the Encounter Report to view Anticoagulation Flowsheet and Dosing Calendar (Go to Encounters tab in chart review, and find the Anticoagulation Therapy Visit)    Spoke with patient.  No changes in health, medication, or diet. No missed doses, no falls. No signs or symptoms of bleed or  clotting.    Spoke with patient about his fall and visit to the ED on 7/9/20      Adam Lo [facility]

## 2020-08-06 DIAGNOSIS — Z79.01 LONG TERM CURRENT USE OF ANTICOAGULANT THERAPY: ICD-10-CM

## 2020-08-06 DIAGNOSIS — Z79.01 CHRONIC ANTICOAGULATION: ICD-10-CM

## 2020-08-06 RX ORDER — WARFARIN SODIUM 10 MG/1
TABLET ORAL
Qty: 12 TABLET | Refills: 0 | Status: SHIPPED | OUTPATIENT
Start: 2020-08-06 | End: 2020-11-19

## 2020-08-06 RX ORDER — WARFARIN SODIUM 7.5 MG/1
7.5 TABLET ORAL DAILY
Qty: 30 TABLET | Refills: 2 | Status: SHIPPED | OUTPATIENT
Start: 2020-08-06 | End: 2020-10-27

## 2020-09-15 ENCOUNTER — ANTICOAGULATION THERAPY VISIT (OUTPATIENT)
Dept: ANTICOAGULATION | Facility: CLINIC | Age: 35
End: 2020-09-15

## 2020-09-15 DIAGNOSIS — Z79.01 LONG TERM CURRENT USE OF ANTICOAGULANT THERAPY: ICD-10-CM

## 2020-09-15 DIAGNOSIS — I26.99 PULMONARY EMBOLISM WITH INFARCTION (H): ICD-10-CM

## 2020-09-15 LAB — INR PPP: 3 (ref 0.9–1.1)

## 2020-09-15 NOTE — PROGRESS NOTES
ANTICOAGULATION FOLLOW-UP CLINIC VISIT    Patient Name:  Hayden Ramires  Date:  9/15/2020  Contact Type:  Telephone    SUBJECTIVE:  Patient Findings     Positives:   Change in health    Comments:   Pt reports 3 weeks ago he had a syncope episode and was seen in his local ED that couldn't find anything. Pt then saw his PCP who ordered a MRI and this came back WNL. Pt does have an appointment on Thursday 9/17 with his local Cardiologist. Pt is not getting any answers of why he had this syncope episode and he is trying to change his diet and hydration with this. Also getting more sleep too.          Clinical Outcomes     Comments:   Pt reports 3 weeks ago he had a syncope episode and was seen in his local ED that couldn't find anything. Pt then saw his PCP who ordered a MRI and this came back WNL. Pt does have an appointment on Thursday 9/17 with his local Cardiologist. Pt is not getting any answers of why he had this syncope episode and he is trying to change his diet and hydration with this. Also getting more sleep too.             OBJECTIVE    Recent labs: (last 7 days)     09/15/20   INR 3.0*       ASSESSMENT / PLAN  INR assessment THER    Recheck INR In: 4 WEEKS    INR Location Home INR      Anticoagulation Summary  As of 9/15/2020    INR goal:   2.0-3.0   TTR:   100.0 % (1 y)   INR used for dosing:   3.0 (9/15/2020)   Warfarin maintenance plan:   10 mg (5 mg x 2) every Thu; 7.5 mg (5 mg x 1.5) all other days   Full warfarin instructions:   10 mg every Thu; 7.5 mg all other days   Weekly warfarin total:   55 mg   No change documented:   Ana Wong, RN   Plan last modified:   Thierry Garcia RN (5/8/2019)   Next INR check:   10/13/2020   Priority:   Maintenance   Target end date:   Indefinite    Indications    Pulmonary embolism with infarction (H) [I26.99] [I26.99]  Long-term (current) use of anticoagulants [Z79.01] [Z79.01]             Anticoagulation Episode Summary     INR check location:        Preferred lab:       Send INR reminders to:   [facility]    Comments:   Okay to go 4 weeks in between INR's per Semaj 6/1/18  HIPPA INFO OK to speak with Mother Janae Mittal [phone]  May leave messages on cell phone  pt prefers to be closer to 3 than 2  Contact Ph [phone]      Anticoagulation Care Providers     Provider Role Specialty Phone number    Sinan Lagunas MD Referring Hematology [phone]            See the Encounter Report to view Anticoagulation Flowsheet and Dosing Calendar (Go to Encounters tab in chart review, and find the Anticoagulation Therapy Visit)    Spoke with patient. Gave them their lab results and new warfarin recommendation.  No changes in health, medication, or diet. No missed doses, no falls. No signs or symptoms of bleed or clotting.     Ana Wong RN

## 2020-10-21 ENCOUNTER — ANTICOAGULATION THERAPY VISIT (OUTPATIENT)
Dept: ANTICOAGULATION | Facility: CLINIC | Age: 35
End: 2020-10-21

## 2020-10-21 DIAGNOSIS — Z79.01 LONG TERM CURRENT USE OF ANTICOAGULANT THERAPY: ICD-10-CM

## 2020-10-21 DIAGNOSIS — I26.99 PULMONARY EMBOLISM WITH INFARCTION (H): ICD-10-CM

## 2020-10-21 LAB — INR PPP: 2.4 (ref 0.9–1.1)

## 2020-10-21 NOTE — PROGRESS NOTES
ANTICOAGULATION FOLLOW-UP CLINIC VISIT    Patient Name:  Hayden Ramires  Date:  10/21/2020  Contact Type:  Telephone    SUBJECTIVE:  Patient Findings         Clinical Outcomes     Negatives:  Major bleeding event, Thromboembolic event, Anticoagulation-related hospital admission, Anticoagulation-related ED visit, Anticoagulation-related fatality           OBJECTIVE    Recent labs: (last 7 days)     10/21/20   INR 2.4*       ASSESSMENT / PLAN  INR assessment THER    Recheck INR In: 4 WEEKS    INR Location Home INR      Anticoagulation Summary  As of 10/21/2020    INR goal:  2.0-3.0   TTR:  100.0 % (1 y)   INR used for dosin.4 (10/21/2020)   Warfarin maintenance plan:  10 mg (5 mg x 2) every Thu; 7.5 mg (5 mg x 1.5) all other days   Full warfarin instructions:  10 mg every Thu; 7.5 mg all other days   Weekly warfarin total:  55 mg   No change documented:  Ana Wong, RN   Plan last modified:  Thierry Garcia RN (2019)   Next INR check:  2020   Priority:  Maintenance   Target end date:  Indefinite    Indications    Pulmonary embolism with infarction (H) [I26.99] [I26.99]  Long-term (current) use of anticoagulants [Z79.01] [Z79.01]             Anticoagulation Episode Summary     INR check location:      Preferred lab:      Send INR reminders to:   [facility]    Comments:  Okay to go 4 weeks in between INR's per Semaj 18  HIPPA INFO OK to speak with Mother Janae or Deanne Daxa [phone]  May leave messages on cell phone  pt prefers to be closer to 3 than 2  Contact  [phone]      Anticoagulation Care Providers     Provider Role Specialty Phone number    Sinan Lagunas MD Referring Hematology [phone]            See the Encounter Report to view Anticoagulation Flowsheet and Dosing Calendar (Go to Encounters tab in chart review, and find the Anticoagulation Therapy Visit)    Spoke with patient. Gave them their lab results and new warfarin recommendation.  No changes  in health, medication, or diet. No missed doses, no falls. No signs or symptoms of bleed or clotting.     Ana Wong RN

## 2020-10-23 DIAGNOSIS — Z86.711 HISTORY OF PULMONARY EMBOLISM: ICD-10-CM

## 2020-10-23 RX ORDER — WARFARIN SODIUM 5 MG/1
TABLET ORAL
Qty: 60 TABLET | Refills: 1 | Status: SHIPPED | OUTPATIENT
Start: 2020-10-23 | End: 2020-11-19

## 2020-10-27 DIAGNOSIS — Z79.01 LONG TERM CURRENT USE OF ANTICOAGULANT THERAPY: ICD-10-CM

## 2020-10-27 RX ORDER — WARFARIN SODIUM 7.5 MG/1
7.5 TABLET ORAL DAILY
Qty: 30 TABLET | Refills: 1 | Status: SHIPPED | OUTPATIENT
Start: 2020-10-27 | End: 2020-11-16

## 2020-11-16 DIAGNOSIS — Z79.01 LONG TERM CURRENT USE OF ANTICOAGULANT THERAPY: ICD-10-CM

## 2020-11-16 RX ORDER — WARFARIN SODIUM 7.5 MG/1
7.5 TABLET ORAL DAILY
Qty: 30 TABLET | Refills: 0 | Status: SHIPPED | OUTPATIENT
Start: 2020-11-16 | End: 2020-11-19

## 2020-11-16 NOTE — TELEPHONE ENCOUNTER
[facility]  [facility]  [facility], [address], [place] [address]  Main: [phone], Fax: [phone]    Documentation Note:    Patient: Hayden Ramires  MRN: [id number]  : 1985  Date of this note written: 2020    Patient was last seen by Dr. Lagunas back on 10/7/2019. He is due for his routine annual follow up visit.    Patient requested refill of warfarin back in Oct 2020. Our  had reached out to schedule the patient for return follow up visit but never heard back from the patient. No follow up clinic appointment is scheduled at this time.    Request for warfarin at 7.5 mg tablet for 90 tablets received at this clinic on 2020. Will authorize 30 days supply with no refill. Will have our  reach out again to schedule patient for follow up.      Amador Wright PA-C, MPAS  Physician Assistant  [facility].

## 2020-11-19 ENCOUNTER — VIRTUAL VISIT (OUTPATIENT)
Dept: HEMATOLOGY | Facility: CLINIC | Age: 35
End: 2020-11-19
Attending: PHYSICIAN ASSISTANT
Payer: COMMERCIAL

## 2020-11-19 VITALS — BODY MASS INDEX: 24.08 KG/M2 | WEIGHT: 185 LBS

## 2020-11-19 DIAGNOSIS — I26.99 PULMONARY EMBOLISM WITH INFARCTION (H): ICD-10-CM

## 2020-11-19 DIAGNOSIS — Z79.01 LONG TERM CURRENT USE OF ANTICOAGULANT THERAPY: Primary | ICD-10-CM

## 2020-11-19 DIAGNOSIS — Z79.01 CHRONIC ANTICOAGULATION: ICD-10-CM

## 2020-11-19 PROCEDURE — 99207 PR CDG-EXAM COMPONENT: MEETS COMPREHENSIVE - UP CODED: CPT | Performed by: PHYSICIAN ASSISTANT

## 2020-11-19 PROCEDURE — 99213 OFFICE O/P EST LOW 20 MIN: CPT | Mod: GT | Performed by: PHYSICIAN ASSISTANT

## 2020-11-19 RX ORDER — WARFARIN SODIUM 7.5 MG/1
7.5 TABLET ORAL DAILY
Qty: 90 TABLET | Refills: 2 | Status: SHIPPED | OUTPATIENT
Start: 2020-11-19 | End: 2021-11-19

## 2020-11-19 RX ORDER — WARFARIN SODIUM 10 MG/1
TABLET ORAL
Qty: 30 TABLET | Refills: 2 | Status: SHIPPED | OUTPATIENT
Start: 2020-11-19 | End: 2021-12-08

## 2020-11-19 NOTE — PROGRESS NOTES
[facility]  [address] [place] [address]  Main: [phone], Fax: [phone]    Patient seen at: [facility] at [address] [place] [address]    Video Virtual Visit Note:    Patient: Hayden Ramires  MRN: [id number]  : 1985  ANABEL: 2020    Due to the ongoing COVID-19 outbreak, this visit was conducted by video, with the patient's approval.    Physician has received verbal consent for a Video Visit from the patient? Yes  Patient logged in via [employer]  Video Start Time: 12:06pm    Reason of today's visit:  History of AT III deficiency and pulmonary embolism in . Strong family history of AT III deficiency and venous thromboembolism. On long term anticoagulation therapy. Here for routine follow up.    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 He was last seen by Dr. Sinan Lagunas, staff hematologist of this clinic back in Oct 2019, please refer to his previous note for this patient's detail clinical history.     In summary, Hayden's brother was first diagnosed with DVT/PE at age 21.  Then his dad was diagnosed with a PE at age 47 while he was bed-bound with a pneumonia.  His dad was found to have antithrombin III deficiency with a level of 53%.  His dad reportedly saw Dr. Harry (Vascular Surgery) at the [facility] and saw a genetic counselor in .  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  He developed some respiratory symptoms, but mostly severe back pain.  He went to the hospital and was found to have a pulmonary embolism in 2012.  On  4/5/12, LE Dopplers of lower extremities showed no DVTs.  He was also found to have AT III deficiency as well at the time with a level of 52%.  He was started on warfarin at that time and has remained on it since.      Interim History:  During his last visit with Dr. Sinan Lagunas, staff hematologist, back in Oct 2019, the decision was made to have him continue on indefinite anticoagulation therapy with coumadin. He has been using a home monitor machine to perform his INR and report this via an marla on his phone to the INR clinic monitoring staffs here at the [facility]. He reports that he has done very well with his INR and most of his INR are within therapeutic range. In fact, he reports that he has not have to change his coumadin dosing for the past 2 years.     He reports that he fell earlier the year and had some thigh pain that eventually radiates down to his calf. He did go in and had an ultrasound done at the time and it was negative for any recurrent DVT. He recalls that he only suffered very minimal amount of bruising from this fall.     He also apparently had 3 syncopal episodes within a 15 minutes period back in Aug 2020. He was found to be hypotensive with a faint pulse at the time. He eventually underwent an extensive cardiology workup that did not revealed any abnormality. He did not injured himself with the syncopal episode.     ROS:  As above. Denies any abdominal pain. Denies any fever. Denies any shortness of breath. No chest pain.      Medication, Allergies and PmHx:  All have been reviewed by this writer in the electronic medical records.    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 He also is a   as well as a supervisor of the [employer] team. He reports that all his colleagues knows about his health condition and knows that he is on anticoagulation therapy. He told me today that he carries a tourniquet with him at all times while he is  on-duty.     Family History:  Deferred.    Objective:  Pleasant in no acute distress.  Visual Examination via Video:  Complete exam is not performed today.    Labs:  Component      Latest Ref Rng & Units 1/20/2020 2/24/2020 4/13/2020 5/21/2020   INR      0.90 - 1.10 2.3 (A) 2.4 (A) 2.5 (A) 2.6 (A)     Component      Latest Ref Rng & Units 6/26/2020 7/9/2020 8/4/2020 9/15/2020   INR      0.90 - 1.10 2.2 (A) 2.5 (A) 2.0 (A) 3.0 (A)     Component      Latest Ref Rng & Units 10/21/2020   INR      0.90 - 1.10 2.4 (A)       Assessment:  In summary, Hayden is a 35 year old male with a history of Antithrombin III deficiency and personal as well as family history of venous thromboembolism, currently on long term anticoagulation therapy with coumadin, participates in today's scheduled video visit for his routine annual follow up.     He has done well with coumadin for the past year. His INR has been mostly within the therapeutic range. The home INR monitoring machine seems to be working well for him. He has no issues with bleeding complications and more importantly has no issues with recurrent thrombosis.     Diagnosis:  1. History of pulmonary embolism in 2012.  2. Antithrombin III deficiency.  3. Chronic anticoagulation therapy with coumadin.  4. Family history of DVT/PE.    Plan:  No change in regard to his anticoagulation therapy. He will continue with coumadin as his mainstay of secondary pharmacological DVT/PE prophylaxis with his goal INR at 2.0-3.0.     He is instructed to call our center if he should need any invasive or surgical procedures or if he should have any unusual bleeding issues.    We will plan to see him back in one year for follow up.    Video-Visit Details:  Type of service:  Video Visit  Video End Time (time video stopped): 12:18pm  Originating Location (pt. Location): Home  Distant Location (provider location):  [facility] HEMOPHIL[facility] OTHER SERVICES   Mode of Communication:  Video Conference  via [facility]      Amador Wright PA-C, MPAS  Physician Assistant  [facility].

## 2020-11-19 NOTE — PROGRESS NOTES
Patient was contacted to complete the pre-visit call prior to their video visit with the provider.  The following statement was read:       This visit will be billed to your insurance the same as an in-person visit. Because of Coronavirus we are instituting video visits when possible to keep everyone safe. This video visit will be conducted between you and the provider.  This service lets us provide the care you need with a video conversation.  If a prescription is necessary, we can send it directly to your pharmacy.If lab work or other testing is needed, we can help arrange a place/time for that to be done at a later date.If during the course of the call the provider feels a video visit is not appropriate, then your insurance company will not be billed.       Allergies and medications were reviewed and travel screening complete.     A test connection was Completed with Pt? No    I thanked them for their time to cover this information     Rigoberto Mancera CMA

## 2020-11-19 NOTE — PATIENT INSTRUCTIONS
Hayden,    It was nice to meet you via video visit today.    Below is what we have discussed:  1. Continue coumadin with your INR goal at 2.0-3.0.  2. Please call our clinic at [phone] if you should need any invasive procedures or if you experience any unusual bleeding issues.  3. One of our administrative assistants will contact you to schedule your return visit with us in one year.    Thank you once again in choosing our clinic as part of your healthcare team.      Amador Wright PA-C, MPAS  Physician Assistant  [facility].

## 2020-12-11 ENCOUNTER — ANTICOAGULATION THERAPY VISIT (OUTPATIENT)
Dept: ANTICOAGULATION | Facility: CLINIC | Age: 35
End: 2020-12-11

## 2020-12-11 DIAGNOSIS — Z79.01 LONG TERM CURRENT USE OF ANTICOAGULANT THERAPY: ICD-10-CM

## 2020-12-11 DIAGNOSIS — I26.99 PULMONARY EMBOLISM WITH INFARCTION (H): ICD-10-CM

## 2020-12-11 LAB — INR PPP: 2.5 (ref 0.9–1.1)

## 2020-12-11 NOTE — PROGRESS NOTES
ANTICOAGULATION FOLLOW-UP CLINIC VISIT    Patient Name:  Hayden Ramires  Date:  2020  Contact Type:  Telephone    SUBJECTIVE:  Patient Findings         Clinical Outcomes     Negatives:  Major bleeding event, Thromboembolic event, Anticoagulation-related hospital admission, Anticoagulation-related ED visit, Anticoagulation-related fatality           OBJECTIVE    Recent labs: (last 7 days)     20   INR 2.5*       ASSESSMENT / PLAN  INR assessment THER    Recheck INR In: 4 WEEKS    INR Location Home INR      Anticoagulation Summary  As of 2020    INR goal:  2.0-3.0   TTR:  100.0 % (1 y)   INR used for dosin.5 (2020)   Warfarin maintenance plan:  10 mg (5 mg x 2) every Thu; 7.5 mg (5 mg x 1.5) all other days   Full warfarin instructions:  10 mg every Thu; 7.5 mg all other days   Weekly warfarin total:  55 mg   No change documented:  Ana Wong, RN   Plan last modified:  Thierry Garcia RN (2019)   Next INR check:  2021   Priority:  Maintenance   Target end date:  Indefinite    Indications    Pulmonary embolism with infarction (H) [I26.99] [I26.99]  Long-term (current) use of anticoagulants [Z79.01] [Z79.01]             Anticoagulation Episode Summary     INR check location:      Preferred lab:      Send INR reminders to:   [facility]    Comments:  Okay to go 4 weeks in between INR's per Semaj 18  HIPPA INFO OK to speak with Mother Janae or Deanne Daxa [phone]  May leave messages on cell phone  pt prefers to be closer to 3 than 2  Contact  [phone]      Anticoagulation Care Providers     Provider Role Specialty Phone number    Sinan Lagunas MD Referring Hematology [phone]            See the Encounter Report to view Anticoagulation Flowsheet and Dosing Calendar (Go to Encounters tab in chart review, and find the Anticoagulation Therapy Visit)    Spoke with patient. Gave them their lab results and new warfarin recommendation.  No changes in  health, medication, or diet. No missed doses, no falls. No signs or symptoms of bleed or clotting.     Ana Wong RN

## 2021-01-14 ENCOUNTER — HEALTH MAINTENANCE LETTER (OUTPATIENT)
Age: 36
End: 2021-01-14

## 2021-01-18 ENCOUNTER — ANTICOAGULATION THERAPY VISIT (OUTPATIENT)
Dept: ANTICOAGULATION | Facility: CLINIC | Age: 36
End: 2021-01-18

## 2021-01-18 DIAGNOSIS — Z79.01 LONG TERM CURRENT USE OF ANTICOAGULANT THERAPY: ICD-10-CM

## 2021-01-18 DIAGNOSIS — I26.99 PULMONARY EMBOLISM WITH INFARCTION (H): ICD-10-CM

## 2021-01-18 LAB — INR PPP: 3 (ref 0.9–1.1)

## 2021-01-18 NOTE — PROGRESS NOTES
ANTICOAGULATION FOLLOW-UP CLINIC VISIT    Patient Name:  Hayden Ramires  Date:  1/18/2021  Contact Type:  Telephone    SUBJECTIVE:  Patient Findings     Positives:  Change in health (recent cold, intertmitent use of cold medicine )        Clinical Outcomes     Negatives:  Major bleeding event, Thromboembolic event, Anticoagulation-related hospital admission, Anticoagulation-related ED visit, Anticoagulation-related fatality           OBJECTIVE    Recent labs: (last 7 days)     01/18/21   INR 3.0*       ASSESSMENT / PLAN  INR assessment THER    Recheck INR In: 4 WEEKS    INR Location Home INR      Anticoagulation Summary  As of 1/18/2021    INR goal:  2.0-3.0   TTR:  100.0 % (1 y)   INR used for dosing:  3.0 (1/18/2021)   Warfarin maintenance plan:  10 mg (5 mg x 2) every Thu; 7.5 mg (5 mg x 1.5) all other days   Full warfarin instructions:  10 mg every Thu; 7.5 mg all other days   Weekly warfarin total:  55 mg   Plan last modified:  Thierry Garcia RN (5/8/2019)   Next INR check:  2/15/2021   Priority:  Maintenance   Target end date:  Indefinite    Indications    Pulmonary embolism with infarction (H) [I26.99] [I26.99]  Long-term (current) use of anticoagulants [Z79.01] [Z79.01]             Anticoagulation Episode Summary     INR check location:      Preferred lab:      Send INR reminders to:  [facility]    Comments:  Okay to go 4 weeks in between INR's per Semaj 6/1/18  HIPPA INFO OK to speak with Mother Janae or Deanne Daxa [phone]  May leave messages on cell phone  pt prefers to be closer to 3 than 2  Contact  [phone]      Anticoagulation Care Providers     Provider Role Specialty Phone number    Sinan Lagunas MD Referring Hematology [phone]            See the Encounter Report to view Anticoagulation Flowsheet and Dosing Calendar (Go to Encounters tab in chart review, and find the Anticoagulation Therapy Visit)    See patient findings for details    PATRICIA MENDEZ RN

## 2021-02-23 ENCOUNTER — DOCUMENTATION ONLY (OUTPATIENT)
Dept: ANTICOAGULATION | Facility: CLINIC | Age: 36
End: 2021-02-23

## 2021-02-23 ENCOUNTER — ANTICOAGULATION THERAPY VISIT (OUTPATIENT)
Dept: ANTICOAGULATION | Facility: CLINIC | Age: 36
End: 2021-02-23

## 2021-02-23 DIAGNOSIS — Z79.01 LONG TERM CURRENT USE OF ANTICOAGULANT THERAPY: ICD-10-CM

## 2021-02-23 DIAGNOSIS — I26.99 PULMONARY EMBOLISM WITH INFARCTION (H): Primary | ICD-10-CM

## 2021-02-23 DIAGNOSIS — I26.99 PULMONARY EMBOLISM WITH INFARCTION (H): ICD-10-CM

## 2021-02-23 LAB — INR PPP: 2.2 (ref 0.9–1.1)

## 2021-02-23 NOTE — PROGRESS NOTES
ANTICOAGULATION MANAGEMENT      Hayden Ramires due for annual renewal of referral to anticoagulation monitoring. Order pended for your review and signature.      ANTICOAGULATION SUMMARY      Warfarin indication(s)     PE    Heart valve present?  NO       Current goal range   INR: 2.0-3.0     Goal appropriate for indication? Yes, INR 2-3 appropriate for hx of DVT, PE, hypercoagulable state, Afib, LVAD, or bileaflet AVR without risk factors     Current duration of therapy Indefinite/long term therapy   Time in Therapeutic Range (TTR)  (Goal > 60%) 100 %       Office visit with referring provider's group within last year yes on 11/19/20       Thierry Garcia, RN

## 2021-02-23 NOTE — PROGRESS NOTES
ANTICOAGULATION FOLLOW-UP CLINIC VISIT    Patient Name:  Hayden Ramires  Date:  2021  Contact Type:  Telephone    SUBJECTIVE:         OBJECTIVE    Recent labs: (last 7 days)     21   INR 2.2*       ASSESSMENT / PLAN  INR assessment THER    Recheck INR In: 4 WEEKS    INR Location Home INR      Anticoagulation Summary  As of 2021    INR goal:  2.0-3.0   TTR:  100.0 % (1 y)   INR used for dosin.2 (2021)   Warfarin maintenance plan:  10 mg (5 mg x 2) every Thu; 7.5 mg (5 mg x 1.5) all other days   Full warfarin instructions:  10 mg every Thu; 7.5 mg all other days   Weekly warfarin total:  55 mg   No change documented:  Thierry Garcia RN   Plan last modified:  Thierry Garcia RN (2019)   Next INR check:  3/23/2021   Priority:  Maintenance   Target end date:  Indefinite    Indications    Pulmonary embolism with infarction (H) [I26.99] [I26.99]  Long-term (current) use of anticoagulants [Z79.01] [Z79.01]             Anticoagulation Episode Summary     INR check location:      Preferred lab:      Send INR reminders to:  [facility]    Comments:  Okay to go 4 weeks in between INR's per Semaj 18  HIPPA INFO OK to speak with Mother Janae Mittal [phone]  May leave messages on cell phone  pt prefers to be closer to 3 than 2  Contact Ph [phone]      Anticoagulation Care Providers     Provider Role Specialty Phone number    Sinan Lagunas MD Referring Hematology [phone]            See the Encounter Report to view Anticoagulation Flowsheet and Dosing Calendar (Go to Encounters tab in chart review, and find the Anticoagulation Therapy Visit)    INR/CFX/F2 RESULT:INR is 2.2 per home monitor    ASSESSMENT:Spoke with patient. Gave them their lab results and new warfarin recommendation.  No changes in health, medication, or diet. No missed doses, no falls. No signs or symptoms of bleed or clotting.    DOSING ADJUSTMENT:continue pattern of dosing    NEXT  INR/FACTOR X OR FACTOR II:3/23    PROTOCOL FOLLOWED:goal 2-3    Thierry Garcia, RN

## 2021-04-01 ENCOUNTER — TELEPHONE (OUTPATIENT)
Dept: ANTICOAGULATION | Facility: CLINIC | Age: 36
End: 2021-04-01

## 2021-04-01 ENCOUNTER — ANTICOAGULATION THERAPY VISIT (OUTPATIENT)
Dept: ANTICOAGULATION | Facility: CLINIC | Age: 36
End: 2021-04-01

## 2021-04-01 DIAGNOSIS — I26.99 PULMONARY EMBOLISM WITH INFARCTION (H): ICD-10-CM

## 2021-04-01 DIAGNOSIS — Z79.01 LONG TERM CURRENT USE OF ANTICOAGULANT THERAPY: ICD-10-CM

## 2021-04-01 LAB — INR PPP: 2.7 (ref 0.9–1.1)

## 2021-04-01 NOTE — PROGRESS NOTES
ANTICOAGULATION FOLLOW-UP CLINIC VISIT    Patient Name:  Hayden Ramires  Date:  2021  Contact Type:  Telephone    SUBJECTIVE:         OBJECTIVE    Recent labs: (last 7 days)     21   INR 2.7*       ASSESSMENT / PLAN  No question data found.  Anticoagulation Summary  As of 2021    INR goal:  2.0-3.0   TTR:  100.0 % (1 y)   INR used for dosin.7 (2021)   Warfarin maintenance plan:  10 mg (5 mg x 2) every Thu; 7.5 mg (5 mg x 1.5) all other days   Full warfarin instructions:  10 mg every Thu; 7.5 mg all other days   Weekly warfarin total:  55 mg   No change documented:  Soledad Resendiz, RN   Plan last modified:  Thierry Garcia RN (2019)   Next INR check:  2021   Priority:  Maintenance   Target end date:  Indefinite    Indications    Pulmonary embolism with infarction (H) [I26.99] [I26.99]  Long-term (current) use of anticoagulants [Z79.01] [Z79.01]             Anticoagulation Episode Summary     INR check location:      Preferred lab:      Send INR reminders to:  [facility]    Comments:  Okay to go 4 weeks in between INR's per Semaj 18  HIPPA INFO OK to speak with Mother Janae or Deanne Mittal [phone]  May leave messages on cell phone  pt prefers to be closer to 3 than 2  Contact Ph [phone]      Anticoagulation Care Providers     Provider Role Specialty Phone number    Sinan Lagunas MD Referring Hematology [phone]            See the Encounter Report to view Anticoagulation Flowsheet and Dosing Calendar (Go to Encounters tab in chart review, and find the Anticoagulation Therapy Visit)    Spoke with patient.     Soledad Resendiz, RN

## 2021-04-01 NOTE — TELEPHONE ENCOUNTER
ANTICOAGULATION     Hayden Ramires is overdue for INR check.      Spoke with Hayden and scheduled lab appointment on he states he will check today on his home INR monitor    Salud Bosch RN

## 2021-05-04 ENCOUNTER — ANTICOAGULATION THERAPY VISIT (OUTPATIENT)
Dept: ANTICOAGULATION | Facility: CLINIC | Age: 36
End: 2021-05-04

## 2021-05-04 ENCOUNTER — TELEPHONE (OUTPATIENT)
Dept: ANTICOAGULATION | Facility: CLINIC | Age: 36
End: 2021-05-04

## 2021-05-04 DIAGNOSIS — Z79.01 LONG TERM CURRENT USE OF ANTICOAGULANT THERAPY: ICD-10-CM

## 2021-05-04 DIAGNOSIS — I26.99 PULMONARY EMBOLISM WITH INFARCTION (H): ICD-10-CM

## 2021-05-04 NOTE — TELEPHONE ENCOUNTER
ANTICOAGULATION     Hayden Ramires is overdue for INR check.      Spoke with Hayden instructed to test INR with home meter and call results to home monitoring company as soon as possible.     He stated he will check at the end of the this week.    PATRICIA MENDEZ RN

## 2021-05-04 NOTE — PROGRESS NOTES
Opened in error,    PATRICIA MENDEZ RN-BC, [facility]  Anticoagulation Clinic  [phone]

## 2021-05-10 ENCOUNTER — ANTICOAGULATION THERAPY VISIT (OUTPATIENT)
Dept: ANTICOAGULATION | Facility: CLINIC | Age: 36
End: 2021-05-10

## 2021-05-10 DIAGNOSIS — Z79.01 LONG TERM CURRENT USE OF ANTICOAGULANT THERAPY: ICD-10-CM

## 2021-05-10 DIAGNOSIS — I26.99 PULMONARY EMBOLISM WITH INFARCTION (H): ICD-10-CM

## 2021-05-10 LAB — INR PPP: 2.9 (ref 0.9–1.1)

## 2021-05-10 NOTE — PROGRESS NOTES
ANTICOAGULATION FOLLOW-UP CLINIC VISIT    Patient Name:  Hayden Ramires  Date:  5/10/2021  Contact Type:  Telephone    SUBJECTIVE:  Patient Findings     Comments:  Spoke with Hayden.  He reports he has not had changes in health, diet, medications.        Clinical Outcomes     Comments:  Spoke with Hayden.  He reports he has not had changes in health, diet, medications.           OBJECTIVE    Recent labs: (last 7 days)     05/10/21   INR 2.9*       ASSESSMENT / PLAN  INR assessment THER    Recheck INR In: 4 WEEKS    INR Location Home INR      Anticoagulation Summary  As of 5/10/2021    INR goal:  2.0-3.0   TTR:  100.0 % (1 y)   INR used for dosin.9 (5/10/2021)   Warfarin maintenance plan:  10 mg (5 mg x 2) every Thu; 7.5 mg (5 mg x 1.5) all other days   Full warfarin instructions:  10 mg every Thu; 7.5 mg all other days   Weekly warfarin total:  55 mg   No change documented:  Salud Bosch RN   Plan last modified:  Thierry Garcia RN (2019)   Next INR check:  2021   Priority:  Maintenance   Target end date:  Indefinite    Indications    Pulmonary embolism with infarction (H) [I26.99] [I26.99]  Long-term (current) use of anticoagulants [Z79.01] [Z79.01]             Anticoagulation Episode Summary     INR check location:      Preferred lab:      Send INR reminders to:  [facility]    Comments:  Okay to go 4 weeks in between INR's per Semaj 18  HIPPA INFO OK to speak with Mother Janae or Deanne Mittal [phone]  May leave messages on cell phone  pt prefers to be closer to 3 than 2  Contact Ph [phone]      Anticoagulation Care Providers     Provider Role Specialty Phone number    Sinan Lagunas MD Referring Hematology [phone]            See the Encounter Report to view Anticoagulation Flowsheet and Dosing Calendar (Go to Encounters tab in chart review, and find the Anticoagulation Therapy Visit)    Spoke with Hayden.    Salud Bosch, RN

## 2021-06-16 ENCOUNTER — TELEPHONE (OUTPATIENT)
Dept: ANTICOAGULATION | Facility: CLINIC | Age: 36
End: 2021-06-16

## 2021-06-16 NOTE — TELEPHONE ENCOUNTER
ANTICOAGULATION     Hayden Ramires is overdue for INR check.      Spoke with Hayden instructed to test INR with home meter and call results to home monitoring company as soon as possible.    PATRICIA MENDEZ RN

## 2021-06-18 ENCOUNTER — ANTICOAGULATION THERAPY VISIT (OUTPATIENT)
Dept: ANTICOAGULATION | Facility: CLINIC | Age: 36
End: 2021-06-18

## 2021-06-18 DIAGNOSIS — Z79.01 LONG TERM CURRENT USE OF ANTICOAGULANT THERAPY: ICD-10-CM

## 2021-06-18 DIAGNOSIS — I26.99 PULMONARY EMBOLISM WITH INFARCTION (H): ICD-10-CM

## 2021-06-18 LAB — INR PPP: 2.7 (ref 0.9–1.1)

## 2021-06-18 NOTE — PROGRESS NOTES
ANTICOAGULATION FOLLOW-UP CLINIC VISIT    Patient Name:  Hayden Ramires  Date:  2021  Contact Type:  Telephone    SUBJECTIVE:  Patient Findings     Comments:  Spoke with Hayden.  He reports he has not had changes in health, diet, medications.        Clinical Outcomes     Comments:  Spoke with Hayden.  He reports he has not had changes in health, diet, medications.           OBJECTIVE    Recent labs: (last 7 days)     21   INR 2.7*       ASSESSMENT / PLAN  INR assessment THER    Recheck INR In: 4 WEEKS    INR Location Home INR      Anticoagulation Summary  As of 2021    INR goal:  2.0-3.0   TTR:  100.0 % (1 y)   INR used for dosin.7 (2021)   Warfarin maintenance plan:  10 mg (5 mg x 2) every Thu; 7.5 mg (5 mg x 1.5) all other days   Full warfarin instructions:  10 mg every Thu; 7.5 mg all other days   Weekly warfarin total:  55 mg   No change documented:  Salud Bosch RN   Plan last modified:  Thierry Martinez RN (2019)   Next INR check:  2021   Priority:  Maintenance   Target end date:  Indefinite    Indications    Pulmonary embolism with infarction (H) [I26.99] [I26.99]  Long-term (current) use of anticoagulants [Z79.01] [Z79.01]             Anticoagulation Episode Summary     INR check location:      Preferred lab:      Send INR reminders to:  [facility]    Comments:  Okay to go 4 weeks in between INR's per Semaj 18  HIPPA INFO OK to speak with Mother Janae or Deanne Daxa [phone]  May leave messages on cell phone  pt prefers to be closer to 3 than 2  Contact Ph [phone]      Anticoagulation Care Providers     Provider Role Specialty Phone number    Sinan Lagunas MD Referring Hematology [phone]            See the Encounter Report to view Anticoagulation Flowsheet and Dosing Calendar (Go to Encounters tab in chart review, and find the Anticoagulation Therapy Visit)    Spoke with Hayden.    Salud Bosch, RN

## 2021-07-29 ENCOUNTER — ANTICOAGULATION THERAPY VISIT (OUTPATIENT)
Dept: ANTICOAGULATION | Facility: CLINIC | Age: 36
End: 2021-07-29

## 2021-07-29 DIAGNOSIS — I26.99 PULMONARY EMBOLISM WITH INFARCTION (H): Primary | ICD-10-CM

## 2021-07-29 DIAGNOSIS — Z79.01 LONG TERM CURRENT USE OF ANTICOAGULANT THERAPY: ICD-10-CM

## 2021-07-29 LAB — INR (EXTERNAL): 2.1 (ref 0.9–1.1)

## 2021-07-29 NOTE — PROGRESS NOTES
ANTICOAGULATION MANAGEMENT     Hayden Nguyent 35 year old male is on warfarin with therapeutic INR result. (Goal INR 2.0-3.0)    Recent labs: (last 7 days)     07/29/21  0000   INR 2.1*       ASSESSMENT     Source(s): Chart Review and Patient/Caregiver Call       Warfarin doses taken: Warfarin taken as instructed    Diet: No new diet changes identified    New illness, injury, or hospitalization: No    Medication/supplement changes: None noted    Signs or symptoms of bleeding or clotting: No    Previous INR: Therapeutic last 2(+) visits    Additional findings: None     PLAN     Recommended plan for no diet, medication or health factor changes affecting INR     Dosing Instructions: Continue your current warfarin dose with next INR in 4 weeks       Summary  As of 7/29/2021    Full warfarin instructions:  10 mg every Thu; 7.5 mg all other days   Next INR check:               Telephone call with Hayden who verbalizes understanding and agrees to plan and who agrees to plan and repeated back plan correctly    Patient to recheck with home meter    Education provided: None required    Plan made per [facility] anticoagulation protocol    Ana Wong, RN  Anticoagulation Clinic  7/29/2021    _______________________________________________________________________     Anticoagulation Episode Summary     Current INR goal:  2.0-3.0   TTR:  100.0 % (1 y)   Target end date:  Indefinite   Send INR reminders to:  [facility]    Indications    Pulmonary embolism with infarction (H) [I26.99] [I26.99]  Long-term (current) use of anticoagulants [Z79.01] [Z79.01]           Comments:  Okay to go 4 weeks in between INR's per Semaj 6/1/18  HIPPA INFO OK to speak with Mother Janae or Deanne Daxa [phone]  May leave messages on cell phone  pt prefers to be closer to 3 than 2  Contact Ph [phone]         Anticoagulation Care Providers     Provider Role Specialty Phone number    Sinan Lagunas MD Referring Hematology  [phone]

## 2021-09-10 ENCOUNTER — ANTICOAGULATION THERAPY VISIT (OUTPATIENT)
Dept: ANTICOAGULATION | Facility: CLINIC | Age: 36
End: 2021-09-10

## 2021-09-10 DIAGNOSIS — Z79.01 LONG TERM CURRENT USE OF ANTICOAGULANT THERAPY: ICD-10-CM

## 2021-09-10 DIAGNOSIS — I26.99 PULMONARY EMBOLISM WITH INFARCTION (H): Primary | ICD-10-CM

## 2021-09-10 LAB — INR (EXTERNAL): 2.1 (ref 2–3)

## 2021-09-10 NOTE — PROGRESS NOTES
ANTICOAGULATION MANAGEMENT     Hayden HARVEY Desmet 35 year old male is on warfarin with therapeutic INR result. (Goal INR 2.0-3.0)    Recent labs: (last 7 days)     09/10/21  1638   INR 2.1       ASSESSMENT     Source(s): Chart Review and Patient/Caregiver Call       Warfarin doses taken: Warfarin taken as instructed    Diet: No new diet changes identified    New illness, injury, or hospitalization: No    Medication/supplement changes: None noted    Signs or symptoms of bleeding or clotting: No    Previous INR: Therapeutic last 2(+) visits    Additional findings: None     PLAN     Recommended plan for no diet, medication or health factor changes affecting INR     Dosing Instructions: Continue your current warfarin dose with next INR in 4 weeks       Summary  As of 9/10/2021    Full warfarin instructions:  10 mg every Thu; 7.5 mg all other days   Next INR check:               Telephone call with Hayden who verbalizes understanding and agrees to plan    Patient to recheck with home meter    Education provided: None required    Plan made per [facility] anticoagulation protocol    Antonina Lowe, RN  Anticoagulation Clinic  9/10/2021    _______________________________________________________________________     Anticoagulation Episode Summary     Current INR goal:  2.0-3.0   TTR:  100.0 % (1 y)   Target end date:  Indefinite   Send INR reminders to:  [facility]    Indications    Pulmonary embolism with infarction (H) [I26.99] [I26.99]  Long-term (current) use of anticoagulants [Z79.01] [Z79.01]           Comments:  Okay to go 4 weeks in between INR's per Semaj 6/1/18  HIPPA INFO OK to speak with Mother Janae or Deanne Mittal [phone]  May leave messages on cell phone  pt prefers to be closer to 3 than 2  Contact Ph [phone]         Anticoagulation Care Providers     Provider Role Specialty Phone number    Sinan Lagunas MD Referring Hematology [phone]

## 2021-10-19 ENCOUNTER — ANTICOAGULATION THERAPY VISIT (OUTPATIENT)
Dept: ANTICOAGULATION | Facility: CLINIC | Age: 36
End: 2021-10-19

## 2021-10-19 DIAGNOSIS — I26.99 PULMONARY EMBOLISM WITH INFARCTION (H): Primary | ICD-10-CM

## 2021-10-19 DIAGNOSIS — Z79.01 LONG TERM CURRENT USE OF ANTICOAGULANT THERAPY: ICD-10-CM

## 2021-10-19 LAB — INR (EXTERNAL): 2.4 (ref 0.9–1.1)

## 2021-10-19 NOTE — PROGRESS NOTES
ANTICOAGULATION MANAGEMENT     Hayden HARVEY Clyman 36 year old male is on warfarin with therapeutic INR result. (Goal INR 2.0-3.0)    Recent labs: (last 7 days)     10/19/21  1119   INR 2.4*       ASSESSMENT     Source(s): Chart Review and Patient/Caregiver Call       Warfarin doses taken: Warfarin taken as instructed    Diet: No new diet changes identified    New illness, injury, or hospitalization: No    Medication/supplement changes: None noted    Signs or symptoms of bleeding or clotting: No    Previous INR: Therapeutic last 2(+) visits    Additional findings: None     PLAN     Recommended plan for no diet, medication or health factor changes affecting INR     Dosing Instructions: Continue your current warfarin dose with next INR in 4 weeks       Summary  As of 10/19/2021    Full warfarin instructions:  10 mg every Thu; 7.5 mg all other days   Next INR check:  11/16/2021             Telephone call with Hayden who verbalizes understanding and agrees to plan and who agrees to plan and repeated back plan correctly    Patient to recheck with home meter    Education provided: Goal range and significance of current result, Importance of notifying clinic for changes in medications; a sooner lab recheck maybe needed., Importance of notifying clinic for diarrhea, nausea/vomiting, reduced intake, and/or illness; a sooner lab recheck maybe needed., Importance of notifying clinic of upcoming surgeries and procedures 2 weeks in advance and Contact [phone] with any changes, questions or concerns.     Plan made per [facility] anticoagulation protocol    PATRICIA MENDEZ RN  Anticoagulation Clinic  10/19/2021    _______________________________________________________________________     Anticoagulation Episode Summary     Current INR goal:  2.0-3.0   TTR:  100.0 % (1 y)   Target end date:  Indefinite   Send INR reminders to:  [facility]    Indications    Pulmonary embolism with infarction (H) [I26.99] [I26.99]  Long-term  (current) use of anticoagulants [Z79.01] [Z79.01]           Comments:  Okay to go 4 weeks in between INR's per Semaj 6/1/18  HIPPA INFO OK to speak with Mother Janae Mittal [phone]  May leave messages on cell phone  pt prefers to be closer to 3 than 2  Contact Ph [phone]         Anticoagulation Care Providers     Provider Role Specialty Phone number    Sinan Lagunas MD Referring Hematology [phone]

## 2021-11-03 DIAGNOSIS — Z79.01 LONG TERM CURRENT USE OF ANTICOAGULANT THERAPY: Primary | ICD-10-CM

## 2021-11-03 DIAGNOSIS — I26.99 PULMONARY EMBOLISM WITH INFARCTION (H): ICD-10-CM

## 2021-11-19 DIAGNOSIS — Z79.01 LONG TERM CURRENT USE OF ANTICOAGULANT THERAPY: ICD-10-CM

## 2021-11-19 RX ORDER — WARFARIN SODIUM 7.5 MG/1
7.5 TABLET ORAL DAILY
Qty: 90 TABLET | Refills: 2 | Status: SHIPPED | OUTPATIENT
Start: 2021-11-19 | End: 2022-06-02

## 2021-11-22 ENCOUNTER — ANTICOAGULATION THERAPY VISIT (OUTPATIENT)
Dept: ANTICOAGULATION | Facility: CLINIC | Age: 36
End: 2021-11-22
Payer: COMMERCIAL

## 2021-11-22 ENCOUNTER — TRANSFERRED RECORDS (OUTPATIENT)
Dept: HEALTH INFORMATION MANAGEMENT | Facility: CLINIC | Age: 36
End: 2021-11-22

## 2021-11-22 ENCOUNTER — VIRTUAL VISIT (OUTPATIENT)
Dept: HEMATOLOGY | Facility: CLINIC | Age: 36
End: 2021-11-22
Attending: PHYSICIAN ASSISTANT
Payer: COMMERCIAL

## 2021-11-22 VITALS — WEIGHT: 180 LBS | BODY MASS INDEX: 23.43 KG/M2

## 2021-11-22 DIAGNOSIS — I26.99 PULMONARY EMBOLISM WITH INFARCTION (H): Primary | ICD-10-CM

## 2021-11-22 DIAGNOSIS — Z79.01 LONG TERM CURRENT USE OF ANTICOAGULANT THERAPY: ICD-10-CM

## 2021-11-22 LAB — INR (EXTERNAL): 2.1 (ref 9–1.1)

## 2021-11-22 PROCEDURE — 99212 OFFICE O/P EST SF 10 MIN: CPT | Mod: GT | Performed by: PHYSICIAN ASSISTANT

## 2021-11-22 NOTE — PROGRESS NOTES
[facility]  [address] [place] [id number]  Main: [phone], Fax: [phone]    Patient seen at: [facility] at [address] [place] [address]    Video Virtual Visit Note:    Patient: Hayden Ramires  MRN: [id number]  : 1985  NAABEL: 2021    Due to the ongoing COVID-19 outbreak, this visit was conducted by video, with the patient's approval.      Reason of today's visit:  History of AT III deficiency and pulmonary embolism in . Strong family history of AT III deficiency and venous thromboembolism. On long term anticoagulation therapy. Here for routine follow up.     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 He was last seen by Dr. Sinan Lagunas, staff hematologist of this clinic back in Oct 2019, and by this writer back on 2020, please refer to his previous note for this patient's detail clinical history.      In summary, Hayden's brother was first diagnosed with DVT/PE at age 21.  Then his dad was diagnosed with a PE at age 47 while he was bed-bound with pneumonia.  His dad was found to have antithrombin III deficiency with a level of 53%.  His dad reportedly saw Dr. Harry (Vascular Surgery) at the [facility] and saw a genetic counselor in .  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 He developed some respiratory symptoms, but mostly severe back pain.  He went to the hospital and was found to have a pulmonary embolism in 2012.  On 12, LE Dopplers of lower extremities showed no DVTs.  He was also found to have AT III deficiency  as well at the time with a level of 52%.  He was started on warfarin at that time and has remained on it since.       Interim History:  During his last visit with Dr. Sinan Lagunas, staff hematologist, back in Oct 2019, the decision was made to have him continue on indefinite anticoagulation therapy with coumadin. He has been using a home monitor machine to perform his INR and report this via an marla on his phone to the INR clinic monitoring staffs here at the [facility]. He reports that he has done very well with his INR and most of his INR are within therapeutic range. In fact, he reports that he has not have to change his coumadin dosing for the past 2 years.      ROS:  He denies any bleeding issues. No epistaxis. No oral mucosal bleeding. Denies any hematuria or blood in stools.     He is planning to undergo vasectomy early next year. He has an urology appointment in Dec 2021 to discuss this.     Medication, Allergies and PmHx:  All have been reviewed by this writer in the electronic medical records.    Social History and Family History:  Deferred.    Objective:  Pleasant in no acute distress.  Visual Examination via Video:  Complete exam is not performed today.     Labs:  Component      Latest Ref Rng & Units 12/11/2020 1/18/2021 2/23/2021 4/1/2021   INR      0.90 - 1.10 2.5 (A) 3.0 (A) 2.2 (A) 2.7 (A)   INR (External)      9 - 1.1         Component      Latest Ref Rng & Units 5/10/2021 6/18/2021 7/29/2021 9/10/2021   INR      0.90 - 1.10 2.9 (A) 2.7 (A)     INR (External)      9 - 1.1   2.1 (A) 2.1     Component      Latest Ref Rng & Units 10/19/2021 11/22/2021   INR      0.90 - 1.10     INR (External)      9 - 1.1 2.4 (A) 2.1 (A)       Assessment:  In summary, Hayden is a 36 year old male with a history of Antithrombin III deficiency and personal as well as family history of venous thromboembolism, currently on long term anticoagulation therapy with coumadin, participates in today's scheduled video  visit for his routine annual follow up.      He has done well with coumadin for the past year. His INR has been mostly within the therapeutic range. The home INR monitoring machine seems to be working well for him. He has no issues with bleeding complications and more importantly has no issues with recurrent thrombosis.      Diagnosis:  1. History of pulmonary embolism in 2012.  2. Antithrombin III deficiency.  3. Chronic anticoagulation therapy with coumadin.  4. Family history of DVT/PE.    Plan:  No change in regard to his anticoagulation therapy. He will continue with coumadin as his mainstay of secondary pharmacological DVT/PE prophylaxis with his goal INR at 2.0-3.0.     I have asked him to call us once he has a set date for his vasectomy procedure in Jan 2022. At which time, we will provide him with anticoagulation therapy management plan surrounding the procedure. Preliminarily, I will likely have him stop coumadin 5 days prior to the procedure. No enoxaparin bridging prior to procedure needed and then start him on full therapeutic dose of enoxaparin post procedure along with warfarin and continue both until his INR reaches therapeutic range, then stop enoxaparin.     I have bring up the possibility of switching to one of the DOACs, but the patient is not interested.     Will plan to see him back annually, sooner if he should have any bleeding complications or any recurrent thromboembolic events.     12 minutes spent on the date of the encounter doing chart review, history and exam, documentation and further activities per the note     Video-Visit Details:  Type of service:  Video Visit  Video Start Time: 14:48  Video End Time (time video stopped): 14:57  Originating Location (pt. Location): Home  Distant Location (provider location):  [facility]   Mode of Communication:  Video Conference via Lonnie Wright PA-C, MPAS  Physician  Assistant  [facility].

## 2021-11-22 NOTE — PATIENT INSTRUCTIONS
Hayden,    Nice to see you via video visit today.     Below is a summary of our plan:  1. Continue with warafrin as your mainstay of anticoagulation therapy. INR goal remains at 2.0-3.0.   2. Please call us at [phone] and ask to speak to a nursing staff once you have found out the date for your vasectomy procedure or if you should need any other procedures or surgery in the future. Also call us if you should have any unusual bleeding issues.   3. One of our administrative assistants will contact you to schedule you to return to clinic for return visit with me in one year.     Thank you once again in choosing our clinic as part of your healthcare team.      Amador Wright PA-C, MPAS  Physician Assistant  [facility].

## 2021-11-22 NOTE — PROGRESS NOTES
ANTICOAGULATION MANAGEMENT     Hayden HARVEY Davenport 36 year old male is on warfarin with therapeutic INR result. (Goal INR 2.0-3.0)    Recent labs: (last 7 days)     11/22/21  1556   INR 2.1*       ASSESSMENT     Source(s): Chart Review       Warfarin doses taken: Reviewed in chart    Diet: No new diet changes identified    New illness, injury, or hospitalization: No    Medication/supplement changes: None noted    Signs or symptoms of bleeding or clotting: No    Previous INR: Therapeutic last 2(+) visits    Additional findings: None     PLAN     Recommended plan for no diet, medication or health factor changes affecting INR     Dosing Instructions: Continue your current warfarin dose with next INR in 4 weeks       Summary  As of 11/22/2021    Full warfarin instructions:  10 mg every Thu; 7.5 mg all other days   Next INR check:  12/20/2021             Detailed voice message left for Hayden with dosing instructions and follow up date.     Patient to recheck with home meter    Education provided: None required    Plan made per [facility] anticoagulation protocol    Thierry Martinez, RN  Anticoagulation Clinic  11/22/2021    _______________________________________________________________________     Anticoagulation Episode Summary     Current INR goal:  2.0-3.0   TTR:  100.0 % (1 y)   Target end date:  Indefinite   Send INR reminders to:  [facility]    Indications    Pulmonary embolism with infarction (H) [I26.99] [I26.99]  Long-term (current) use of anticoagulants [Z79.01] [Z79.01]           Comments:  Okay to go 4 weeks in between INR's per Semaj 6/1/18  HIPPA INFO OK to speak with Mother Janae or Deanne Mittal [phone]  May leave messages on cell phone  pt prefers to be closer to 3 than 2  Contact Ph [phone]         Anticoagulation Care Providers     Provider Role Specialty Phone number    Sinan Lagunas MD Referring Hematology [phone]

## 2021-11-22 NOTE — PROGRESS NOTES
Patient was contacted to complete the pre-visit call prior to their video visit with the provider.      Allergies and medications were reviewed.    I thanked them for their time to cover this information     Rigoberto Mancera CMA

## 2021-12-08 DIAGNOSIS — Z79.01 CHRONIC ANTICOAGULATION: ICD-10-CM

## 2021-12-08 RX ORDER — WARFARIN SODIUM 10 MG/1
TABLET ORAL
Qty: 12 TABLET | Refills: 7 | Status: SHIPPED | OUTPATIENT
Start: 2021-12-08 | End: 2022-01-07

## 2021-12-22 ENCOUNTER — VIRTUAL VISIT (OUTPATIENT)
Dept: UROLOGY | Facility: CLINIC | Age: 36
End: 2021-12-22
Payer: COMMERCIAL

## 2021-12-22 DIAGNOSIS — Z30.09 ENCOUNTER FOR VASECTOMY COUNSELING: Primary | ICD-10-CM

## 2021-12-22 PROCEDURE — 99203 OFFICE O/P NEW LOW 30 MIN: CPT | Mod: GT | Performed by: UROLOGY

## 2021-12-22 NOTE — PROGRESS NOTES
Hayden Ramires  who is being evaluated via a billable video visit.      How would you like to obtain your AVS? MyChart  If the video visit is dropped, the invitation should be resent by: Send to e-mail at: [email or web address]  Will anyone else be joining your video visit? No    Video-Visit Details    Type of service:  Video Visit    Video Start Time: 3:13 PM    Video End Time:3:27 PM    Originating Location (pt. Location): Home    Distant Location (provider location):  [facility]     Platform used for Video Visit: Chon      CC: Desires sterilization, consult for vasectomy.    HPI: Hayden Ramires is a 36 year old male intersted in getting a vasectomy for sterilization.   They have one      No voiding problems.  No history of  trauma.  No hematuria  Normal sexual function.  No history of bleeding problems.    PMH:   Past Medical History:   Diagnosis Date     Allergic rhinitis      Antithrombin III deficiency (H)      Anxiety      Asthma      Depression      Pulmonary embolism (H)      No history of surgery. Other than 3rd molar extractions.  History of multiple clots in 2012.    FAMILY HX:   Family history of AT3 deficiency.    ALLERGIES:    No Known Allergies    MEDS:   Current Outpatient Medications   Medication Sig     warfarin (COUMADIN) 5 MG tablet Take 1 tablet (5 mg) by mouth daily 1.5 tabs on Thursday, then 2 tabs (10 mg) Daily ROW and as directed by medication monitoring     warfarin ANTICOAGULANT (COUMADIN) 10 MG tablet TAKE 1 TAB BY MOUTH ON THURS,AND 7.5MG ALL OTHER DAYS OR AS DIRECTED BY INR CLINIC     warfarin ANTICOAGULANT (COUMADIN) 7.5 MG tablet TAKE 1 TABLET (7.5 MG) BY MOUTH DAILY OR AS DIRECTED BY THE COUMADIN CLINIC     WARFARIN SODIUM PO Take 7.5 mg by mouth daily     cetirizine (ZYRTEC) 10 MG tablet Take 10 mg by mouth daily (Patient not taking: Reported on 12/22/2021)     multivitamin, therapeutic (THERA-VIT) TABS Take 1 tablet by mouth daily (Patient not  taking: Reported on 12/22/2021)     Omega-3 Fatty Acids (OMEGA-3 FISH OIL PO) Take 1 g by mouth daily (Patient not taking: Reported on 12/22/2021)     SERTRALINE HCL PO Take 100 mg by mouth daily (Patient not taking: Reported on 12/22/2021)     No current facility-administered medications for this visit.       SOCIAL HISTORY:  Social History     Tobacco Use     Smoking status: Former Smoker     Packs/day: 0.30     Years: 10.00     Pack years: 3.00     Types: Cigarettes     Smokeless tobacco: Former User     Quit date: 1/15/2013   Substance Use Topics     Alcohol use: Yes     Alcohol/week: 0.0 standard drinks     Drug use: No        REVIEW OF SYMPTOMS:   Denies testicular pain, ED, ejaculatory problems, rashes in the groin, easy bruising or bleeding.   No constitutional, eye, ENT, heart, lung, GI, musculoskeletal, skin, neurologic, psychiatric, endocrine or hematologic complaints.       GENERAL PHYSICAL EXAM:   Exam  General- Alert, oriented, nad.  Pleasant and conversant.  Eyes- anicteric, EOMI.  Resps- normal, non-labored.  No cough  Abdomen-  nondistended.   exam- deferred.   Neurological - no tremors  Skin - no discoloration/ lesions noted  Psychiatric - no anxiety, alert & oriented.       The rest of a comprehensive physical examination is deferred due to video visit restrictions.       I discussed with him at length the risks and benefits of the procedure. He understands that this is a sterilization procedure, and not reversible contraception. He understands that reversals, while possible, are not guaranteed to work and fairly complex. I discussed with him the option of sperm cryopreservation.     I stressed that he continues to be fertile in the post-operative period, and that he should continue using other contraceptive methods, such as a condom, until he obtains a semen analysis and we review the results to confirm success of the procedure and infertility. I also stressed to him that recanalization and  pregnancy can occur in about 1 per thousand cases, possibly more even after we clear him with a semen analysis showing no motile sperm. I counseled him on the rakesh-operative risks of bleeding, infection, pain.  I described to him post-vasectomy pain syndrome that can occur in about 1 to 2% of men undergoing vasectomy.     We also discussed recovery times (typically days if no complications) and post-operative care including use of ice packs, pain medication and wound care.      He will call when he'd like to schedule the vasectomy.  He will bridge with Lovenox per Dr. Lagunas/ coumadin clinic so he's off Warfarin for the procedure.  He should be OK to restart Lovenox pretty immediately after the procedure.    Additional Coding Information:    Problems:  3 -- one acute uncomplicated illness or injury    Data Reviewed  N/A     Level of risk:  4 -- minor surgery with patient or procedure risks ( risk of bleed/clot due to genetic hypocoagulable state)    Time spent:  21 minutes spent on the date of the encounter doing chart review, history and exam, documentation and further activities per the note

## 2022-01-07 DIAGNOSIS — Z79.01 CHRONIC ANTICOAGULATION: ICD-10-CM

## 2022-01-07 RX ORDER — WARFARIN SODIUM 10 MG/1
TABLET ORAL
Qty: 12 TABLET | Refills: 7 | Status: SHIPPED | OUTPATIENT
Start: 2022-01-07 | End: 2022-06-02

## 2022-01-11 ENCOUNTER — TRANSFERRED RECORDS (OUTPATIENT)
Dept: HEALTH INFORMATION MANAGEMENT | Facility: CLINIC | Age: 37
End: 2022-01-11
Payer: COMMERCIAL

## 2022-01-12 ENCOUNTER — TELEPHONE (OUTPATIENT)
Dept: ANTICOAGULATION | Facility: CLINIC | Age: 37
End: 2022-01-12
Payer: COMMERCIAL

## 2022-01-12 ENCOUNTER — ANTICOAGULATION THERAPY VISIT (OUTPATIENT)
Dept: ANTICOAGULATION | Facility: CLINIC | Age: 37
End: 2022-01-12
Payer: COMMERCIAL

## 2022-01-12 ENCOUNTER — TELEPHONE (OUTPATIENT)
Dept: HEMATOLOGY | Facility: CLINIC | Age: 37
End: 2022-01-12
Payer: COMMERCIAL

## 2022-01-12 DIAGNOSIS — Z79.01 LONG TERM CURRENT USE OF ANTICOAGULANT THERAPY: ICD-10-CM

## 2022-01-12 DIAGNOSIS — D68.59 ANTITHROMBIN DEFICIENCY (H): ICD-10-CM

## 2022-01-12 DIAGNOSIS — Z86.711 HISTORY OF PULMONARY EMBOLISM: Primary | ICD-10-CM

## 2022-01-12 LAB — INR HOME MONITORING: 2.1 (ref 2–3)

## 2022-01-12 NOTE — PROGRESS NOTES
ANTICOAGULATION MANAGEMENT     Hayden Ramires 36 year old male is on warfarin with therapeutic INR result. (Goal INR 2.0-3.0)    Recent labs: (last 7 days)     01/11/22  0000   INR 2.10       ASSESSMENT     Source(s): Chart Review and Patient/Caregiver Call       Warfarin doses taken: Warfarin taken as instructed    Diet: No new diet changes identified    New illness, injury, or hospitalization: No    Medication/supplement changes: None noted    Signs or symptoms of bleeding or clotting: No    Previous INR: Therapeutic last 2(+) visits    Additional findings: Pt met with urology on 12/22/21 to discuss a vasectomy. Pt will need to hold warfarin before procedure. Pt has not scheduled yet but will update [facility] clinic when scheduled.    Sent separate note to [facility]     PLAN     Recommended plan for no diet, medication or health factor changes affecting INR     Dosing Instructions: Continue your current warfarin dose with next INR in 4 weeks       Summary  As of 1/12/2022    Full warfarin instructions:  10 mg every Thu; 7.5 mg all other days   Next INR check:  2/8/2022             Telephone call with Hayden who verbalizes understanding and agrees to plan    Patient to recheck with home meter    Education provided: Importance of notifying clinic for changes in medications; a sooner lab recheck maybe needed. and Contact [phone] with any changes, questions or concerns.     Plan made per [facility] anticoagulation protocol    Amanda Noland RN  Anticoagulation Clinic  1/12/2022    _______________________________________________________________________     Anticoagulation Episode Summary     Current INR goal:  2.0-3.0   TTR:  100.0 % (1 y)   Target end date:  Indefinite   Send INR reminders to:  [facility]    Indications    Pulmonary embolism with infarction (H) [I26.99] [I26.99]  Long-term (current) use of anticoagulants [Z79.01] [Z79.01]           Comments:  Okay to go 4 weeks in between INR's per Semaj 6/1/18  DEANNA  INFO OK to speak with Mother Janae Mittal [phone]  May leave messages on cell phone  pt prefers to be closer to 3 than 2  Contact Ph [phone]         Anticoagulation Care Providers     Provider Role Specialty Phone number    Sinan Lagunas MD Referring Hematology [phone]

## 2022-01-12 NOTE — TELEPHONE ENCOUNTER
ANTICOAGULATION MANAGEMENT      Hayden Ramires due for annual renewal of referral to anticoagulation monitoring. Order pended for your review and signature.      ANTICOAGULATION SUMMARY      Warfarin indication(s)     Pulmonary embolism with infarction (H) [I26.99] [I26.99]  Long-term (current) use of anticoagulants [Z79.01] [Z79.01]       Heart valve present?  NO       Current goal range   INR: 2.0-3.0     Goal appropriate for indication? Yes, INR 2-3 appropriate for hx of DVT, PE, hypercoagulable state, Afib, LVAD, or bileaflet AVR without risk factors     Current duration of therapy Indefinite/long term therapy   Time in Therapeutic Range (TTR)  (Goal > 60%) 100%       Office visit with referring provider's group within last year yes on 11/22/21       Amanda Noland RN      
Other

## 2022-01-12 NOTE — TELEPHONE ENCOUNTER
Pt had a consult for vasectomy on 12/22/21. Pt as not scheduled yet but will need to hold warfarin for procedure and possibly need to bridge as well. Pt will recheck INR on or around 2/8/22. Pt reports that the procedure will not be scheduled before then.    Will send to [facility] for review and warfarin interruption plan

## 2022-02-12 ENCOUNTER — HEALTH MAINTENANCE LETTER (OUTPATIENT)
Age: 37
End: 2022-02-12

## 2022-02-18 ENCOUNTER — TELEPHONE (OUTPATIENT)
Dept: ANTICOAGULATION | Facility: CLINIC | Age: 37
End: 2022-02-18
Payer: COMMERCIAL

## 2022-02-18 NOTE — TELEPHONE ENCOUNTER
ANTICOAGULATION     Hayden Nguyent is overdue for INR check.      Left message for patient to call and schedule lab appointment as soon as possible. If returning call, please schedule.     Perla Barton RN

## 2022-02-24 ENCOUNTER — DOCUMENTATION ONLY (OUTPATIENT)
Dept: HEMATOLOGY | Facility: CLINIC | Age: 37
End: 2022-02-24
Payer: COMMERCIAL

## 2022-02-24 ENCOUNTER — MYC MEDICAL ADVICE (OUTPATIENT)
Dept: HEMATOLOGY | Facility: CLINIC | Age: 37
End: 2022-02-24
Payer: COMMERCIAL

## 2022-02-24 ENCOUNTER — ANTICOAGULATION THERAPY VISIT (OUTPATIENT)
Dept: ANTICOAGULATION | Facility: CLINIC | Age: 37
End: 2022-02-24
Payer: COMMERCIAL

## 2022-02-24 DIAGNOSIS — D68.59 ANTITHROMBIN DEFICIENCY (H): ICD-10-CM

## 2022-02-24 DIAGNOSIS — Z79.01 LONG TERM CURRENT USE OF ANTICOAGULANT THERAPY: ICD-10-CM

## 2022-02-24 DIAGNOSIS — Z86.711 HISTORY OF PULMONARY EMBOLISM: ICD-10-CM

## 2022-02-24 DIAGNOSIS — I26.99 PULMONARY EMBOLISM WITH INFARCTION (H): Primary | ICD-10-CM

## 2022-02-24 LAB — INR HOME MONITORING: 2.3 (ref 2–3)

## 2022-02-24 NOTE — PROGRESS NOTES
[facility]  Center for Bleeding [facility] Clotting Disorders  [facility] [address], [address], [place] [address]  Main: [phone], Fax: [phone]    Documentation Note:    Patient: Hayden Ramires  MRN: [id number]  : 1985  Date of this note written: 2022    Hayden sent a [employer] message inquiring about anticoagulation management plan for his upcoming vasectomy procedure by Dr. Henley, urologist at [place] which is yet to be scheduled.     Anticoagulation therapy management plan for this upcoming vasectomy surgery is as follow:    Stop coumadin 5 days prior to procedure.     No need for enoxaparin bridging prior to procedure.     Start enoxaparin at 80 mg SubQ Q 12 hours along with his usual dose of coumadin the day following his procedure.     Continue both enoxaparin and coumadin until his INR reaches therapeutic goal range of 2.0-3.0 for 2 consecutive days, then discontinue enoxaparin.     I will forward this plan to his coumadin monitoring clinic.      Amador Wright PA-C, MPAS  Physician Assistant  [facility] for Bleeding and Clotting Disorders.

## 2022-02-24 NOTE — PROGRESS NOTES
"2/24/22  See plan for 5 day hold with POST procedure only plan in Amador's note on 2/24.  TN              Estimated body mass index is 23.43 kg/m  as calculated from the following:    Height as of 10/7/19: 1.867 m (6' 1.5\").    Weight as of 11/22/21: 81.6 kg (180 lb).      2/24/22    Writer requested Hayden get a Creatinine level drawn before making pre/post procedure plan.  Patient has not bridged before, stated he \"knows his dad used Lovenox\".  See My chart messages today from patient to Amador.  Going forward, patient is on a waiting list for an earlier procedure.  At time of encounter it is scheduled for 5/11/22.  Discouraged patient from stopping Warfarin without a plan in place.  Hayden verbalized understanding.  TN      ANTICOAGULATION MANAGEMENT     Hayden HARVEY Hereford 36 year old male is on warfarin with therapeutic INR result. (Goal INR 2.0-3.0)    Recent labs: (last 7 days)     02/24/22  0000   INR 2.30       ASSESSMENT     Source(s): Chart Review and Patient/Caregiver Call       Warfarin doses taken: Warfarin taken as instructed    Diet: No new diet changes identified    New illness, injury, or hospitalization: No    Medication/supplement changes: None noted    Signs or symptoms of bleeding or clotting: No    Previous INR: Therapeutic last 2(+) visits    Additional findings: See above.  Interruption of Warfarin plan needs to be in place prior to patients procedure.     PLAN     Recommended plan for no diet, medication or health factor changes affecting INR     Dosing Instructions: Continue your current warfarin dose with next INR in 4 weeks       Summary  As of 2/24/2022    Full warfarin instructions:  10 mg every Thu; 7.5 mg all other days   Next INR check:  3/24/2022             Telephone call with Hayden who agrees to plan and repeated back plan correctly    Patient to recheck with home meter    Education provided: Lovenox/Heparin education provided: role of enoxaparin/heparin in bridge therapy, prescribed " dose and frequency and proper technique for administration of subcutaneous injection  and Importance of notifying clinic of upcoming surgeries and procedures 2 weeks in advance    Plan made per [facility] anticoagulation protocol    Thierry Martinez, RN  Anticoagulation Clinic  2/24/2022    _______________________________________________________________________     Anticoagulation Episode Summary     Current INR goal:  2.0-3.0   TTR:  100.0 % (1 y)   Target end date:  Indefinite   Send INR reminders to:   [facility]    Indications    Pulmonary embolism with infarction (H) [I26.99] [I26.99]  Long-term (current) use of anticoagulants [Z79.01] [Z79.01]  Antithrombin deficiency (H) [D68.59]  History of pulmonary embolism [Z86.711]           Comments:  Okay to go 4 weeks in between INR's per Semaj 6/1/18  HIPPA INFO OK to speak with Mother Janae or Deanne Mittal [phone]  May leave messages on cell phone  pt prefers to be closer to 3 than 2  Contact Ph [phone]         Anticoagulation Care Providers     Provider Role Specialty Phone number    Sinan Lagunas MD Referring Hematology [phone]

## 2022-04-04 ENCOUNTER — ANTICOAGULATION THERAPY VISIT (OUTPATIENT)
Dept: ANTICOAGULATION | Facility: CLINIC | Age: 37
End: 2022-04-04
Payer: COMMERCIAL

## 2022-04-04 DIAGNOSIS — D68.59 ANTITHROMBIN DEFICIENCY (H): ICD-10-CM

## 2022-04-04 DIAGNOSIS — Z79.01 LONG TERM CURRENT USE OF ANTICOAGULANT THERAPY: ICD-10-CM

## 2022-04-04 DIAGNOSIS — I26.99 PULMONARY EMBOLISM WITH INFARCTION (H): Primary | ICD-10-CM

## 2022-04-04 DIAGNOSIS — Z86.711 HISTORY OF PULMONARY EMBOLISM: ICD-10-CM

## 2022-04-04 LAB — INR HOME MONITORING: 1.8 (ref 2–3)

## 2022-04-04 NOTE — PROGRESS NOTES
ANTICOAGULATION MANAGEMENT     Hayden HARVEY O'Brien 36 year old male is on warfarin with subtherapeutic INR result. (Goal INR 2.0-3.0)    Recent labs: (last 7 days)     04/04/22  0000   INR 1.80*       ASSESSMENT       Source(s): Patient/Caregiver Call       Warfarin doses taken: Warfarin taken as instructed    Diet: Decreased greens/vitamin K in diet; plans to resume previous intake    New illness, injury, or hospitalization: Yes: Pt is fighting a cold and has been having bouts of emesis     Medication/supplement changes: None noted    Signs or symptoms of bleeding or clotting: No    Previous INR: Therapeutic last 2(+) visits    Additional findings: 3/10/22 Creatinine 0.82 done at [facility]     Recommended plan for temporary change(s) affecting INR     Dosing Instructions: booster dose then continue your current warfarin dose with next INR in 2 weeks       Summary  As of 4/4/2022    Full warfarin instructions:  4/4: 10 mg; Otherwise 10 mg every Thu; 7.5 mg all other days   Next INR check:  4/18/2022             Telephone call with Hayden who verbalizes understanding and agrees to plan and who agrees to plan and repeated back plan correctly    Patient to recheck with home meter    Education provided: None required    Plan made per [facility] anticoagulation protocol    Ana Wong, RN  Anticoagulation Clinic  4/4/2022    _______________________________________________________________________     Anticoagulation Episode Summary     Current INR goal:  2.0-3.0   TTR:  95.7 % (1 y)   Target end date:  Indefinite   Send INR reminders to:  [facility]    Indications    Pulmonary embolism with infarction (H) [I26.99] [I26.99]  Long-term (current) use of anticoagulants [Z79.01] [Z79.01]  Antithrombin deficiency (H) [D68.59]  History of pulmonary embolism [Z86.711]           Comments:  Okay to go 4 weeks in between INR's per Semaj 6/1/18  HIPPA INFO OK to speak with Mother Janae or Deanne Daxa [phone]   May leave messages on cell phone  pt prefers to be closer to 3 than 2  Contact Ph [phone]         Anticoagulation Care Providers     Provider Role Specialty Phone number    Sinan Lagunas MD Referring Hematology [phone]

## 2022-05-06 ENCOUNTER — TELEPHONE (OUTPATIENT)
Dept: ANTICOAGULATION | Facility: CLINIC | Age: 37
End: 2022-05-06
Payer: COMMERCIAL

## 2022-05-06 ENCOUNTER — TELEPHONE (OUTPATIENT)
Dept: HEMATOLOGY | Facility: CLINIC | Age: 37
End: 2022-05-06
Payer: COMMERCIAL

## 2022-05-06 DIAGNOSIS — I26.99 PULMONARY EMBOLISM WITH INFARCTION (H): Primary | ICD-10-CM

## 2022-05-06 DIAGNOSIS — D68.59 ANTITHROMBIN DEFICIENCY (H): ICD-10-CM

## 2022-05-06 DIAGNOSIS — Z79.01 LONG TERM CURRENT USE OF ANTICOAGULANT THERAPY: ICD-10-CM

## 2022-05-06 DIAGNOSIS — Z86.711 HISTORY OF PULMONARY EMBOLISM: ICD-10-CM

## 2022-05-06 RX ORDER — ENOXAPARIN SODIUM 100 MG/ML
80 INJECTION SUBCUTANEOUS EVERY 12 HOURS
Qty: 16 ML | Refills: 1 | Status: SHIPPED | OUTPATIENT
Start: 2022-05-06

## 2022-05-06 NOTE — TELEPHONE ENCOUNTER
[facility]  Center for Bleeding and Clotting Disorders  [facility] [address], [address], [place] [address]  Main: [phone], Fax: [phone]    Telephone Note:    Patient: Hayden Ramires  MRN: [id number]  : 1985  Date of this note written: May 6, 2022    Patient sent a [employer] message informing this writer that he is currently scheduled to undergo vasectomy surgery on 2022 and is inquiring about his anticoagulation management plan.    This writer called the patient on 2022 at 09:30 and reviewed the plan that was as outlined in my documentation on 2022. Please refer to that note for details.    I have asked the patient to call the INR monitoring clinic to obtain specifics details in regard to warfarin management around the time of this upcoming vasectomy surgery.       Amador Wright PA-C, MPAS  Physician Assistant  [facility] for Bleeding and Clotting Disorders.

## 2022-05-06 NOTE — TELEPHONE ENCOUNTER
22  Spoke to Hayden.  Plan outlined below per Amador.  Hayden will start holding Warfarin tonight.  No pre-procedure bridging.  Sent 80mg Enoxaparin syringes to [facility].  Hayden repeated back instructions on restarting Warfarin and Enoxaparin post procedure.  Will test an INR on home monitor on 22 (changed priority to High).  He is to have 2 therapeutic INR results documented before discontinuing Lovenox.  Sent My chart message to patient.  Answered questions on Lovenox administration.    Thierry Martinez RN        Patient: Hayden Ramires  MRN: [id number]  : 1985  Date of this note written: 2022     Hayden sent a [employer] message inquiring about anticoagulation management plan for his upcoming vasectomy procedure by Dr. Henley, urologist at [place] which is yet to be scheduled.      Anticoagulation therapy management plan for this upcoming vasectomy surgery is as follow:    Stop coumadin 5 days prior to procedure.       No need for enoxaparin bridging prior to procedure.       Start enoxaparin at 80 mg SubQ Q 12 hours along with his usual dose of coumadin the day following his procedure.       Continue both enoxaparin and coumadin until his INR reaches therapeutic goal range of 2.0-3.0 for 2 consecutive days, then discontinue enoxaparin.      I will forward this plan to his coumadin monitoring clinic.       Amador Wright PA-C, MPAS  Physician Assistant  [facility] for Bleeding and Clotting Disorders.

## 2022-05-11 ENCOUNTER — OFFICE VISIT (OUTPATIENT)
Dept: UROLOGY | Facility: CLINIC | Age: 37
End: 2022-05-11
Payer: COMMERCIAL

## 2022-05-11 VITALS — DIASTOLIC BLOOD PRESSURE: 73 MMHG | SYSTOLIC BLOOD PRESSURE: 116 MMHG | HEART RATE: 66 BPM | OXYGEN SATURATION: 99 %

## 2022-05-11 DIAGNOSIS — Z30.2 ENCOUNTER FOR VASECTOMY: Primary | ICD-10-CM

## 2022-05-11 PROCEDURE — 55250 REMOVAL OF SPERM DUCT(S): CPT | Performed by: UROLOGY

## 2022-05-11 NOTE — LETTER
May 11, 2022      RE: Hayden Ramires  [address] [place] [id number]       To whom it may concern:    Hayden Ramires was seen in our clinic today. He  may return to work with the following: No working or lifting restrictions on or about 5/17/2022.    Sincerely,      Umair Henley MD

## 2022-05-11 NOTE — PROGRESS NOTES
Procedure: Vasectomy bilateral.   Anesthesia: Local lidocaine injection by surgeon.  Surgeon: KAREN Henley M.D.   Assistant:  Anali Farmer    Description of procedure: After the patient received education material regarding vasectomy, including risks and benefits, we proceeded with obtaining consent and proceeded with the surgery. He understands that there is a risk of late failure from recanalization. He understands that he is fertile until he has completed one or more semen analyses and he is given clearance from us for unprotected intercourse.  He understands that we will contact regarding the results but it is his responsibility to make sure he is cleared before having unprotected intercourse.  I have discussed with him other risks including bleeding, infection, acute and possible long-term pain.  He has bridged off Warfarin for the vasectomy procedure.    The right vas was ligated first.  This was done using the standard technique by grasping it with a three-finger  and lifting it up to the skin.  A small amount of lidocaine was infiltrated into the skin and vasal sheath.  The skin was punctured and dilated bluntly using the scalpel-less dissector.  The sheath was identified and a rigid clamp was passed around the vas which was then lifted out of the incision.  The vas was cleaned off from the deferential vessels and the sheath, and cautery was used to divide the vas between mosquitos.  A small segment was removed and discarded.  The lumen of the vas was cannulated to confirm its identity, and the lumens of both ends were cauterized.  Pen cautery was used sparingly/as needed for minor bleeders.  A facial interposition was then performed with a single suture of 4-0 Monocryl. The skin defect was closed with a 4-0 chromic interrupted suture after confirming adequate hemostasis.       We then turned our attention to the left side and a similar technique was performed. This involved division, excision of a  segment, cautery of the lumens, and fascial interposition.    There were no hematomas noted at the end of the procedure.  The patient tolerated the procedure well.    He was advised to return for a post vasectomy semen check in 2-3 months, and that he is not sterile and must continue to use contraception until we tell him otherwise (based on follow-up semen specimen(s)).    Plan:  -Post vasectomy semen analysis in 2-3 months   -ice packs to scrotum X 24h  -shower ok tomorrow  -OTC analgesics recommended     Rosalio NORRIS

## 2022-05-11 NOTE — NURSING NOTE
Hayden Ramires's goals for this visit include:   Chief Complaint   Patient presents with     Sterilization       He requests these members of his care team be copied on today's visit information:     PCP: Yonis Kaiser    Referring Provider:  No referring provider defined for this encounter.    /73 (BP Location: Right arm, Patient Position: Sitting, Cuff Size: Adult Large)   Pulse 66   SpO2 99%     Do you need any medication refills at today's visit?     Anali Farmer LPN on 5/11/2022 at 3:24 PM

## 2022-05-11 NOTE — PATIENT INSTRUCTIONS
Vasectomy Post-Op Care Instructions     You may go home after the procedure is completed. There may be some pain in your groin for 3 or 4 days after the operation. Some blood or yellow liquid may ooze from the cuts on the outside. The area around the cuts may swell and bruise. The sutures will dissolve on their own and do not require removal by the physician.    The first 48 hours after the procedure are crucial to healing. Generally, you may feel very good the day after the procedure, but that does not mean it is time to go back to normal activities. Resuming normal activities too soon is likely to cause internal bleeding and lots of pain.      Your provider may advise the following ways to care for yourself after the procedure:    Put an ice bag or package of frozen peas covered by a thin towel over the scrotum after the procedure. Leave the ice on the area for 30 minutes and then take it off for 30 minutes. Do this off and on for at least 24 hours.      Avoid all heavy lifting (greater than 10 pounds) for at least one week.    Wear a jockstrap or tight-fitting underwear for approximately one week to support the scrotum (testicles) and help reduce discomfort.    Take a pain reliever, such as Acetaminophen (Tylenol) or Ibuprofen (Advil), for any pain after the procedure. Your provider may prescribe a stronger pain medicine if it is needed.    You should be able to return to work in 48 hours, but strenuous activity should be avoided for two weeks.    Do not submerge the incision for 1 week following the procedure.    Ejaculation should be avoided for one week to allow the area to heal.    Follow-Up    You will need to have a negative post vasectomy analyses (no sperm seen) before you can discontinue birth control.    Semen Analysis   Schedule the post-vasectomy semen analysis three months after your vasectomy. You will need to ejaculate at least 20 times between your surgery and the first sample. Clinic staff will  contact your regarding your results via phone.    Please call the number below to set up your post vasectomy semen analysis:    [facility]  [facility]  [address]  [address] [place] [address]  [phone]    Call [phone] with questions. For concerns or questions after hours or on weekends, please page the Urology Resident on-call: [phone].

## 2022-05-18 ENCOUNTER — ANTICOAGULATION THERAPY VISIT (OUTPATIENT)
Dept: ANTICOAGULATION | Facility: CLINIC | Age: 37
End: 2022-05-18
Payer: COMMERCIAL

## 2022-05-18 DIAGNOSIS — Z86.711 HISTORY OF PULMONARY EMBOLISM: ICD-10-CM

## 2022-05-18 DIAGNOSIS — Z79.01 LONG TERM CURRENT USE OF ANTICOAGULANT THERAPY: ICD-10-CM

## 2022-05-18 DIAGNOSIS — D68.59 ANTITHROMBIN DEFICIENCY (H): ICD-10-CM

## 2022-05-18 DIAGNOSIS — I26.99 PULMONARY EMBOLISM WITH INFARCTION (H): Primary | ICD-10-CM

## 2022-05-18 LAB — INR HOME MONITORING: 2.8 (ref 2–3)

## 2022-05-18 NOTE — PROGRESS NOTES
ANTICOAGULATION MANAGEMENT     Hayden HARVEY [place] 36 year old male is on warfarin with therapeutic INR result. (Goal INR 2.0-3.0)    Recent labs: (last 7 days)     05/18/22  0000   INR 2.80       ASSESSMENT       Source(s): Chart Review and Patient/Caregiver Call       Warfarin doses taken: Warfarin taken as instructed    Diet: No new diet changes identified    New illness, injury, or hospitalization: Yes: Patient had a vasectomy on 5/11/22    Medication/supplement changes: None noted    Signs or symptoms of bleeding or clotting: No    Previous INR: Subtherapeutic    Additional findings: Patient preference is to stop Lovenox today being that INR is 2.8 today.  Patient agreeable to rechecking an INR in 1 week.        PLAN     Recommended plan for temporary change(s) affecting INR     Dosing Instructions: continue your current warfarin dose with next INR in 1 week       Summary  As of 5/18/2022    Full warfarin instructions:  10 mg every Thu; 7.5 mg all other days   Next INR check:  5/25/2022             Telephone call with Hayden who verbalizes understanding and agrees to plan and who agrees to plan and repeated back plan correctly    Patient to recheck with home meter    Education provided: Goal range and significance of current result, Importance of therapeutic range and Lovenox/Heparin education provided: prescribed dose and frequency     Plan made per [facility] anticoagulation protocol    Soledad Resendiz RN  Anticoagulation Clinic  5/18/2022    _______________________________________________________________________     Anticoagulation Episode Summary     Current INR goal:  2.0-3.0   TTR:  93.3 % (1 y)   Target end date:  Indefinite   Send INR reminders to:   [facility]    Indications    Pulmonary embolism with infarction (H) [I26.99] [I26.99]  Long-term (current) use of anticoagulants [Z79.01] [Z79.01]  Antithrombin deficiency (H) [D68.59]  History of pulmonary embolism [Z86.711]           Comments:  Okay to go 4  weeks in between INR's per Semaj 6/1/18  HIPPA INFO OK to speak with Mother Janae Mittal [phone]  May leave messages on cell phone  pt prefers to be closer to 3 than 2  Contact Ph [phone]         Anticoagulation Care Providers     Provider Role Specialty Phone number    Sinan Lagunas MD Referring Hematology [phone]

## 2022-05-27 ENCOUNTER — MYC MEDICAL ADVICE (OUTPATIENT)
Dept: ANTICOAGULATION | Facility: CLINIC | Age: 37
End: 2022-05-27
Payer: COMMERCIAL

## 2022-05-27 NOTE — TELEPHONE ENCOUNTER
ANTICOAGULATION     Hayden Ramires is overdue for INR check.      My chart message sent.    Medhat Howard RN

## 2022-06-02 ENCOUNTER — ANTICOAGULATION THERAPY VISIT (OUTPATIENT)
Dept: ANTICOAGULATION | Facility: CLINIC | Age: 37
End: 2022-06-02
Payer: COMMERCIAL

## 2022-06-02 DIAGNOSIS — Z86.711 HISTORY OF PULMONARY EMBOLISM: ICD-10-CM

## 2022-06-02 DIAGNOSIS — D68.59 ANTITHROMBIN DEFICIENCY (H): ICD-10-CM

## 2022-06-02 DIAGNOSIS — Z79.01 LONG TERM CURRENT USE OF ANTICOAGULANT THERAPY: ICD-10-CM

## 2022-06-02 DIAGNOSIS — I26.99 PULMONARY EMBOLISM WITH INFARCTION (H): Primary | ICD-10-CM

## 2022-06-02 DIAGNOSIS — Z79.01 CHRONIC ANTICOAGULATION: ICD-10-CM

## 2022-06-02 LAB — INR HOME MONITORING: 2.9 (ref 2–3)

## 2022-06-02 RX ORDER — WARFARIN SODIUM 7.5 MG/1
7.5 TABLET ORAL DAILY
Qty: 90 TABLET | Refills: 1 | Status: SHIPPED | OUTPATIENT
Start: 2022-06-02 | End: 2022-11-28

## 2022-06-02 RX ORDER — WARFARIN SODIUM 10 MG/1
TABLET ORAL
Qty: 15 TABLET | Refills: 1 | Status: SHIPPED | OUTPATIENT
Start: 2022-06-02 | End: 2022-11-28

## 2022-06-02 NOTE — PROGRESS NOTES
ANTICOAGULATION MANAGEMENT     Hayden HARVEY [place] 36 year old male is on warfarin with therapeutic INR result. (Goal INR 2.0-3.0)    Recent labs: (last 7 days)     06/02/22  0000   INR 2.90       ASSESSMENT       Source(s): Chart Review and Patient/Caregiver Call       Warfarin doses taken: Warfarin taken as instructed    Diet: No new diet changes identified    New illness, injury, or hospitalization: No    Medication/supplement changes: None noted    Signs or symptoms of bleeding or clotting: No    Previous INR: Therapeutic last visit; previously outside of goal range    Additional findings: Refill needed today. Hayden meets all criteria for refill (current [facility] referral, office visit with referring provider/group in last year, lab monitoring up to date or not exceeding 2 weeks overdue). Rx instructions and quantity supplied updated to match patient's current dosing plan. Warfarin 90 day supply with 1 refill granted per [facility] protocol        PLAN     Recommended plan for no diet, medication or health factor changes affecting INR     Dosing Instructions: continue your current warfarin dose with next INR in 4 weeks       Summary  As of 6/2/2022    Full warfarin instructions:  10 mg every Thu; 7.5 mg all other days   Next INR check:  6/30/2022             Telephone call with Hayden who verbalizes understanding and agrees to plan    Patient to recheck with home meter    Education provided: Goal range and significance of current result and Contact [phone] with any changes, questions or concerns.     Plan made per [facility] anticoagulation protocol    Perla Barton RN  Anticoagulation Clinic  6/2/2022    _______________________________________________________________________     Anticoagulation Episode Summary     Current INR goal:  2.0-3.0   TTR:  93.3 % (1 y)   Target end date:  Indefinite   Send INR reminders to:  [facility]    Indications    Pulmonary embolism with infarction (H) [I26.99] [I26.99]  Long-term  (current) use of anticoagulants [Z79.01] [Z79.01]  Antithrombin deficiency (H) [D68.59]  History of pulmonary embolism [Z86.711]           Comments:  Okay to go 4 weeks in between INR's per Semaj 6/1/18         Anticoagulation Care Providers     Provider Role Specialty Phone number    Sinan Lagunas MD Referring Hematology [phone]

## 2022-07-18 ENCOUNTER — ANTICOAGULATION THERAPY VISIT (OUTPATIENT)
Dept: ANTICOAGULATION | Facility: CLINIC | Age: 37
End: 2022-07-18

## 2022-07-18 DIAGNOSIS — I26.99 PULMONARY EMBOLISM WITH INFARCTION (H): Primary | ICD-10-CM

## 2022-07-18 DIAGNOSIS — Z86.711 HISTORY OF PULMONARY EMBOLISM: ICD-10-CM

## 2022-07-18 DIAGNOSIS — D68.59 ANTITHROMBIN DEFICIENCY (H): ICD-10-CM

## 2022-07-18 DIAGNOSIS — Z79.01 LONG TERM CURRENT USE OF ANTICOAGULANT THERAPY: ICD-10-CM

## 2022-07-18 LAB — INR HOME MONITORING: 2.2 (ref 2–3)

## 2022-07-18 NOTE — PROGRESS NOTES
Addendum 7/20/22 Patient calls today to report that he is starting a course of doxycycline today.  Writer requests that patient recheck an INR in 5-7 days. [facility]              ANTICOAGULATION MANAGEMENT     Hayden Ramires 36 year old male is on warfarin with therapeutic INR result. (Goal INR 2.0-3.0)    Recent labs: (last 7 days)     07/18/22  0000   INR 2.2       ASSESSMENT       Source(s): Chart Review and Patient/Caregiver Call       Warfarin doses taken: Warfarin taken as instructed    Diet: No new diet changes identified    New illness, injury, or hospitalization: No    Medication/supplement changes: None noted    Signs or symptoms of bleeding or clotting: No    Previous INR: Therapeutic last 2(+) visits    Additional findings: None       PLAN     Recommended plan for no diet, medication or health factor changes affecting INR     Dosing Instructions: continue your current warfarin dose with next INR in 4 weeks       Summary  As of 7/18/2022    Full warfarin instructions:  10 mg every Thu; 7.5 mg all other days   Next INR check:  8/15/2022             Telephone call with Hayden who verbalizes understanding and agrees to plan    Patient to recheck with home meter    Education provided: Goal range and significance of current result    Plan made per [facility] anticoagulation protocol    Rebecca Mccord RN  Anticoagulation Clinic  7/18/2022    _______________________________________________________________________     Anticoagulation Episode Summary     Current INR goal:  2.0-3.0   TTR:  93.3 % (1 y)   Target end date:  Indefinite   Send INR reminders to:  [facility]    Indications    Pulmonary embolism with infarction (H) [I26.99] [I26.99]  Long-term (current) use of anticoagulants [Z79.01] [Z79.01]  Antithrombin deficiency (H) [D68.59]  History of pulmonary embolism [Z86.711]           Comments:  Okay to go 4 weeks in between INR's per Semaj 6/1/18         Anticoagulation Care Providers     Provider Role  Specialty Phone number    Sinan Lagunas MD Referring Hematology [phone]

## 2022-08-22 ENCOUNTER — ANTICOAGULATION THERAPY VISIT (OUTPATIENT)
Dept: ANTICOAGULATION | Facility: CLINIC | Age: 37
End: 2022-08-22

## 2022-08-22 DIAGNOSIS — I26.99 PULMONARY EMBOLISM WITH INFARCTION (H): Primary | ICD-10-CM

## 2022-08-22 DIAGNOSIS — D68.59 ANTITHROMBIN DEFICIENCY (H): ICD-10-CM

## 2022-08-22 DIAGNOSIS — Z79.01 LONG TERM CURRENT USE OF ANTICOAGULANT THERAPY: ICD-10-CM

## 2022-08-22 DIAGNOSIS — Z86.711 HISTORY OF PULMONARY EMBOLISM: ICD-10-CM

## 2022-08-22 LAB — INR HOME MONITORING: 2.8 (ref 2–3)

## 2022-08-22 NOTE — PROGRESS NOTES
ANTICOAGULATION MANAGEMENT     Hayden HARVEY [place] 36 year old male is on warfarin with therapeutic INR result. (Goal INR 2.0-3.0)    Recent labs: (last 7 days)     08/22/22  0000   INR 2.8       ASSESSMENT       Source(s): Chart Review and Patient/Caregiver Call       Warfarin doses taken: Warfarin taken as instructed    Diet: No new diet changes identified    New illness, injury, or hospitalization: No    Medication/supplement changes: Pt has been taking doxycycline daily since end July for rash.     Signs or symptoms of bleeding or clotting: No    Previous INR: Therapeutic last 2(+) visits    Additional findings: None       PLAN     Recommended plan for no diet, medication or health factor changes affecting INR     Dosing Instructions: Continue your current warfarin dose with next INR in 4 weeks       Summary  As of 8/22/2022    Full warfarin instructions:  10 mg every Thu; 7.5 mg all other days   Next INR check:  9/19/2022             Telephone call with Hayden who verbalizes understanding and agrees to plan    Patient to recheck with home meter    Education provided: Goal range and significance of current result, Importance of therapeutic range, Monitoring for bleeding signs and symptoms and Importance of notifying clinic for changes in medications; a sooner lab recheck maybe needed.    Plan made per [facility] anticoagulation protocol    Jonas Sexton RN  Anticoagulation Clinic  8/22/2022    _______________________________________________________________________     Anticoagulation Episode Summary     Current INR goal:  2.0-3.0   TTR:  93.3 % (1 y)   Target end date:  Indefinite   Send INR reminders to:  [facility]    Indications    Pulmonary embolism with infarction (H) [I26.99] [I26.99]  Long-term (current) use of anticoagulants [Z79.01] [Z79.01]  Antithrombin deficiency (H) [D68.59]  History of pulmonary embolism [Z86.711]           Comments:  Okay to go 4 weeks in between INR's per Semaj 6/1/18          Anticoagulation Care Providers     Provider Role Specialty Phone number    Sinan Lagunas MD Referring Hematology [phone]

## 2022-09-02 ENCOUNTER — TELEPHONE (OUTPATIENT)
Dept: HEMATOLOGY | Facility: CLINIC | Age: 37
End: 2022-09-02

## 2022-09-02 NOTE — TELEPHONE ENCOUNTER
Called and left voicemail for patient about scheduling follow up in November 2022. Gave [phone] as call back number.

## 2022-09-21 ENCOUNTER — MYC MEDICAL ADVICE (OUTPATIENT)
Dept: ANTICOAGULATION | Facility: CLINIC | Age: 37
End: 2022-09-21

## 2022-09-21 NOTE — TELEPHONE ENCOUNTER
Hayden,     Our records show you were due to check your INR on 9/19/22.    Please check INR with your home meter and report results to the home monitoring company as soon as possible.       PATRICIA MENDEZ RN  [facility] Anticoagulation Management Program

## 2022-09-29 ENCOUNTER — MYC MEDICAL ADVICE (OUTPATIENT)
Dept: ANTICOAGULATION | Facility: CLINIC | Age: 37
End: 2022-09-29

## 2022-09-29 ENCOUNTER — ANTICOAGULATION THERAPY VISIT (OUTPATIENT)
Dept: ANTICOAGULATION | Facility: CLINIC | Age: 37
End: 2022-09-29

## 2022-09-29 DIAGNOSIS — Z79.01 LONG TERM CURRENT USE OF ANTICOAGULANT THERAPY: ICD-10-CM

## 2022-09-29 DIAGNOSIS — Z86.711 HISTORY OF PULMONARY EMBOLISM: ICD-10-CM

## 2022-09-29 DIAGNOSIS — D68.59 ANTITHROMBIN DEFICIENCY (H): ICD-10-CM

## 2022-09-29 DIAGNOSIS — I26.99 PULMONARY EMBOLISM WITH INFARCTION (H): Primary | ICD-10-CM

## 2022-09-29 LAB — INR HOME MONITORING: 2.2 (ref 2–3)

## 2022-09-29 NOTE — TELEPHONE ENCOUNTER
Hayden,     Our records show you were due to check your INR on 9/19/22.    Please check INR with your home meter and report results to the home monitoring company as soon as possible.       PATRICIA MENDEZ RN  [facility] Anticoagulation Management Program

## 2022-09-29 NOTE — PROGRESS NOTES
ANTICOAGULATION MANAGEMENT     Hayden Nguyent 37 year old male is on warfarin with therapeutic INR result. (Goal INR 2.0-3.0)    Recent labs: (last 7 days)     09/29/22  0000   INR 2.2       ASSESSMENT       Source(s): Chart Review and Patient/Caregiver Call       Warfarin doses taken: Warfarin taken as instructed    Diet: No new diet changes identified    New illness, injury, or hospitalization: No    Medication/supplement changes: None noted    Signs or symptoms of bleeding or clotting: No    Previous INR: Therapeutic last 2(+) visits    Additional findings: None       PLAN     Recommended plan for no diet, medication or health factor changes affecting INR     Dosing Instructions: Continue your current warfarin dose with next INR in 4 weeks       Summary  As of 9/29/2022    Full warfarin instructions:  10 mg every Thu; 7.5 mg all other days   Next INR check:  10/27/2022             Telephone call with Hayden who verbalizes understanding and agrees to plan and who agrees to plan and repeated back plan correctly    Patient to recheck with home meter    Education provided: None required    Plan made per [facility] anticoagulation protocol    Thierry Martinez, RN  Anticoagulation Clinic  9/29/2022    _______________________________________________________________________     Anticoagulation Episode Summary     Current INR goal:  2.0-3.0   TTR:  93.3 % (1 y)   Target end date:  Indefinite   Send INR reminders to:  [facility]    Indications    Pulmonary embolism with infarction (H) [I26.99] [I26.99]  Long-term (current) use of anticoagulants [Z79.01] [Z79.01]  Antithrombin deficiency (H) [D68.59]  History of pulmonary embolism [Z86.711]           Comments:  Okay to go 4 weeks in between INR's per Semaj 6/1/18         Anticoagulation Care Providers     Provider Role Specialty Phone number    Sinan Lagunas MD Referring Hematology [phone]

## 2022-10-10 ENCOUNTER — HEALTH MAINTENANCE LETTER (OUTPATIENT)
Age: 37
End: 2022-10-10

## 2022-11-02 ENCOUNTER — ANTICOAGULATION THERAPY VISIT (OUTPATIENT)
Dept: ANTICOAGULATION | Facility: CLINIC | Age: 37
End: 2022-11-02

## 2022-11-02 DIAGNOSIS — Z79.01 LONG TERM CURRENT USE OF ANTICOAGULANT THERAPY: ICD-10-CM

## 2022-11-02 DIAGNOSIS — Z86.711 HISTORY OF PULMONARY EMBOLISM: ICD-10-CM

## 2022-11-02 DIAGNOSIS — I26.99 PULMONARY EMBOLISM WITH INFARCTION (H): Primary | ICD-10-CM

## 2022-11-02 DIAGNOSIS — D68.59 ANTITHROMBIN DEFICIENCY (H): ICD-10-CM

## 2022-11-02 LAB — INR HOME MONITORING: 2 (ref 2–3)

## 2022-11-02 NOTE — PROGRESS NOTES
"ANTICOAGULATION MANAGEMENT     Hayden Nguyent 37 year old male is on warfarin with therapeutic INR result. (Goal INR 2.0-3.0)    Recent labs: (last 7 days)     11/02/22  0000   INR 2.0       ASSESSMENT       Source(s): Chart Review and Patient/Caregiver Call       Warfarin doses taken: Warfarin taken as instructed    Diet: No new diet changes identified    New illness, injury, or hospitalization: Yes: ongoing \"cold\"-cough is improving    Medication/supplement changes: Tessalon pearls for cough-no drug drug interaction with Warfarin found    Signs or symptoms of bleeding or clotting: No    Previous INR: Therapeutic last 2(+) visits    Additional findings: None       PLAN     Recommended plan for no diet, medication or health factor changes affecting INR     Dosing Instructions: Continue your current warfarin dose with next INR in 4 weeks       Summary  As of 11/2/2022    Full warfarin instructions:  10 mg every Thu; 7.5 mg all other days; Starting 11/2/2022   Next INR check:  11/30/2022             Telephone call with Hayden who verbalizes understanding and agrees to plan and who agrees to plan and repeated back plan correctly    Patient to recheck with home meter    Education provided:     Contact [phone] with any changes, questions or concerns.     Plan made per [facility] anticoagulation protocol    PATRICIA MENDEZ RN  Anticoagulation Clinic  11/2/2022    _______________________________________________________________________     Anticoagulation Episode Summary     Current INR goal:  2.0-3.0   TTR:  93.3 % (1 y)   Target end date:  Indefinite   Send INR reminders to:  [facility]    Indications    Pulmonary embolism with infarction (H) [I26.99] [I26.99]  Long-term (current) use of anticoagulants [Z79.01] [Z79.01]  Antithrombin deficiency (H) [D68.59]  History of pulmonary embolism [Z86.711]           Comments:  Okay to go 4 weeks in between INR's per Semaj 6/1/18         Anticoagulation Care Providers     " Provider Role Specialty Phone number    Sinan Lagunas MD Referring Hematology [phone]

## 2022-11-28 ENCOUNTER — DOCUMENTATION ONLY (OUTPATIENT)
Dept: ANTICOAGULATION | Facility: CLINIC | Age: 37
End: 2022-11-28

## 2022-11-28 ENCOUNTER — VIRTUAL VISIT (OUTPATIENT)
Dept: HEMATOLOGY | Facility: CLINIC | Age: 37
End: 2022-11-28
Attending: PHYSICIAN ASSISTANT
Payer: COMMERCIAL

## 2022-11-28 DIAGNOSIS — Z79.01 CHRONIC ANTICOAGULATION: ICD-10-CM

## 2022-11-28 DIAGNOSIS — Z86.711 HISTORY OF PULMONARY EMBOLISM: ICD-10-CM

## 2022-11-28 DIAGNOSIS — Z79.01 LONG TERM CURRENT USE OF ANTICOAGULANT THERAPY: ICD-10-CM

## 2022-11-28 DIAGNOSIS — D68.59 ANTITHROMBIN DEFICIENCY (H): ICD-10-CM

## 2022-11-28 DIAGNOSIS — I26.99 PULMONARY EMBOLISM WITH INFARCTION (H): Primary | ICD-10-CM

## 2022-11-28 PROCEDURE — 99213 OFFICE O/P EST LOW 20 MIN: CPT | Mod: GT | Performed by: PHYSICIAN ASSISTANT

## 2022-11-28 RX ORDER — WARFARIN SODIUM 7.5 MG/1
7.5 TABLET ORAL DAILY
Qty: 90 TABLET | Refills: 4 | Status: SHIPPED | OUTPATIENT
Start: 2022-11-28 | End: 2023-10-03

## 2022-11-28 RX ORDER — WARFARIN SODIUM 10 MG/1
TABLET ORAL
Qty: 30 TABLET | Refills: 1 | Status: SHIPPED | OUTPATIENT
Start: 2022-11-28 | End: 2023-10-03

## 2022-11-28 NOTE — PROGRESS NOTES
Received the following in basket staff message:    Sofya May PA-C Jax, Ina LOCK RN  Hi InaHayden is a mutual patient that is on long term anticoagulation with warfarin.     I saw him for a virtual hematology follow-up visit today. Over the past year, his INRs have been pretty stable in his goal range of 2-3.     He mentioned to me today that he has much more peace of mind when his INR is in the 2.5-3 range and was wanting to adjust his dosing a bit. I have no problem with him shooting for the higher end of range but told him to discuss any recommended dosing adjustments to achieve that with your team, so I just wanted to give you a heads up.     I did renew his prescription for the year.      Hayden's next anticipated INR recheck is 11/30/22-dosing can be adjusted at that time.    INA MENDEZ RN-BC, [facility]  Anticoagulation Clinic  [phone]

## 2022-11-28 NOTE — PROGRESS NOTES
[facility]  [facility]  [address], [address], [place] [address]  Main: [phone], Fax: [phone]      Outpatient Video Visit Note:    Patient: Hayden Ramires  MRN: [id number]  : 1985  ANABEL: 2022    History of Present Illness  Hayden Ramires is a 37 year old male that is well known to our clinic for his history of antithrombin deficiency and unprovoked PE in . His initial [facility] with the [facility] was with Dr. Lagunas on 2015. Please refer to this consultation note and subsequent follow-up notes for additional details regarding this patient's personal/family history and initial presentation. He is maintained on long term anticoagulation with warfarin and is here today for routine follow-up.     Interval History:  Continues on warfarin with home INR monitoring. INRs have been largely in range over the past year. Denies bleeding--major or nusiance. No recurrent venous thromboembolism.    ROS:  Denies epistaxis, oral/mucosal bleeding, excessive bruising/ecchymosis, hematuria, hematochezia, and melena.  Denies LE pain/swelling. Denies chest pain. Denies shortness of breath.     Family History:   Strong family history of antithrombin deficiency and venous thromboembolism. This is detailed in previous clinic notes.    Exam via Televideo:  Exam:   Constitutional: Appears well, no distress.  Respiratory: Normal work of breathing.  Neuro: AOx3.    Labs:  Component      Latest Ref Rng & Units 2021   INR HOME MONITORING      2.000 - 3.000 2.1 (A) 2.30 1.80 (L) 2.80     Component      Latest Ref Rng & Units 2022   INR HOME MONITORING      2.000 - 3.000 2.90 2.2 2.8 2.2     Component      Latest Ref Rng & Units 2022   INR HOME MONITORING      2.000 - 3.000 2.0       Assessment/Plan:  In summary, Hayden Ramires is a 37  year old male with a history of antithrombin deficiency and unprovoked PE in 2012 that is maintained on indefinite anticoagulation with warfarin and is here today for routine follow-up. Over the past year, Hayden has done well on warfarin. His INRs have been largely stable in goal range and he has had no bleeding complications or recurrent venous thromboembolism. We discussed the possibility of transitioning to a direct oral anticoagulant, but Hayden prefers to remain on warfarin at this time. He remains and appropriate candidate for chronic anticoagulation with warfarin, so we will continue that at this time. He should follow-up in 1 year.    The patient understands and agrees with the above recommendations. The patient was given our center's contact information and was instructed to call if any further questions/concerns arise.    25 minutes spent on the date of the encounter doing chart review, history and exam, documentation and further activities per the note.           Sofya May PA-C, [facility]  [facility]  [facility], [address], [place] [address]  Main: [phone], Fax: [phone]  Joined the call at 11/28/2022, 1:28:37 pm.  Left the call at 11/28/2022, 1:39:24 pm.  You were on the call for 10 minutes 46 seconds .-

## 2022-11-28 NOTE — PROGRESS NOTES
Patient was contacted to complete the pre-visit call prior to their video visit with the provider.      Allergies and medications were reviewed.    I thanked them for their time to cover this information     Natty Bella CMA

## 2022-12-08 ENCOUNTER — MYC MEDICAL ADVICE (OUTPATIENT)
Dept: ANTICOAGULATION | Facility: CLINIC | Age: 37
End: 2022-12-08

## 2022-12-08 NOTE — TELEPHONE ENCOUNTER
Hayden,     Our records show you were due to check your INR on 11/30/22.    Please call [phone] as soon as possible to schedule an appointment.  If there has been a change in your care or other concerns, please reply to let us know so we can help or update our records.       PATRICIA MENDEZ RN  [facility] Anticoagulation Management Program

## 2022-12-19 ENCOUNTER — MYC MEDICAL ADVICE (OUTPATIENT)
Dept: ANTICOAGULATION | Facility: CLINIC | Age: 37
End: 2022-12-19

## 2022-12-19 NOTE — TELEPHONE ENCOUNTER
Hayden,     Our records show you were due to check your INR on 11/30/22.    Please call [phone] as soon as possible to schedule an appointment.  If there has been a change in your care or other concerns, please reply to let us know so we can help or update our records.       PATRICIA MENDEZ RN  [facility] Anticoagulation Management Program

## 2022-12-23 ENCOUNTER — ANTICOAGULATION THERAPY VISIT (OUTPATIENT)
Dept: ANTICOAGULATION | Facility: CLINIC | Age: 37
End: 2022-12-23

## 2022-12-23 DIAGNOSIS — D68.59 ANTITHROMBIN DEFICIENCY (H): ICD-10-CM

## 2022-12-23 DIAGNOSIS — I26.99 PULMONARY EMBOLISM WITH INFARCTION (H): Primary | ICD-10-CM

## 2022-12-23 DIAGNOSIS — Z86.711 HISTORY OF PULMONARY EMBOLISM: ICD-10-CM

## 2022-12-23 DIAGNOSIS — Z79.01 LONG TERM CURRENT USE OF ANTICOAGULANT THERAPY: ICD-10-CM

## 2022-12-23 LAB — INR HOME MONITORING: 2 (ref 2–3)

## 2022-12-23 NOTE — PROGRESS NOTES
ANTICOAGULATION MANAGEMENT     Hayden Nguyent 37 year old male is on warfarin with therapeutic INR result. (Goal INR 2.0-3.0)    Recent labs: (last 7 days)     12/23/22  0000   INR 2.0       ASSESSMENT       Source(s): Chart Review and Patient/Caregiver Call       Warfarin doses taken: Warfarin taken as instructed    Diet: No new diet changes identified    New illness, injury, or hospitalization: No    Medication/supplement changes: None noted    Signs or symptoms of bleeding or clotting: No    Previous INR: Therapeutic last 2(+) visits    Additional findings: Patient preference is to keep INR in the 2.5-3 range.  Dose adjustment today and patient is agreeable to testing an INR in 2 weeks.        PLAN     Recommended plan for no diet, medication or health factor changes affecting INR     Dosing Instructions: Increase your warfarin dose (4.5% change) with next INR in 2 weeks       Summary  As of 12/23/2022    Full warfarin instructions:  10 mg every Mon, Thu; 7.5 mg all other days   Next INR check:  1/6/2023             Telephone call with Hayden who verbalizes understanding and agrees to plan and who agrees to plan and repeated back plan correctly    Patient to recheck with home meter    Education provided:     Taking warfarin: Importance of taking warfarin as instructed    Goal range and lab monitoring: goal range and significance of current result and Importance of therapeutic range    Plan made per [facility] anticoagulation protocol    Soledad Resendiz RN  Anticoagulation Clinic  12/23/2022    _______________________________________________________________________     Anticoagulation Episode Summary     Current INR goal:  2.0-3.0   TTR:  93.3 % (1 y)   Target end date:  Indefinite   Send INR reminders to:   [facility]    Indications    Pulmonary embolism with infarction (H) [I26.99] [I26.99]  Long-term (current) use of anticoagulants [Z79.01] [Z79.01]  Antithrombin deficiency (H) [D68.59]  History of pulmonary  embolism [Z86.711]           Comments:  Prefers INR 2.5-3.0 goal range-ok with Hematology to aim for preferred goal range.  Okay to go 4 weeks in between INR's per Semaj 6/1/18         Anticoagulation Care Providers     Provider Role Specialty Phone number    Sinan Lagunas MD Referring Hematology [phone]

## 2022-12-27 ENCOUNTER — DOCUMENTATION ONLY (OUTPATIENT)
Dept: ANTICOAGULATION | Facility: CLINIC | Age: 37
End: 2022-12-27

## 2022-12-27 DIAGNOSIS — D68.59 ANTITHROMBIN DEFICIENCY (H): ICD-10-CM

## 2022-12-27 DIAGNOSIS — Z79.01 LONG TERM CURRENT USE OF ANTICOAGULANT THERAPY: Primary | ICD-10-CM

## 2022-12-27 DIAGNOSIS — I26.99 PULMONARY EMBOLISM WITH INFARCTION (H): ICD-10-CM

## 2022-12-27 NOTE — PROGRESS NOTES
ANTICOAGULATION CLINIC REFERRAL RENEWAL REQUEST       An annual renewal order is required for all patients referred to [facility]ticoagulation [facility].?  Please review and sign the pended referral order for Hayden HARVEY [place].       ANTICOAGULATION SUMMARY      Warfarin indication(s) PE      Mechanical heart valve present?  NO       Current goal range   INR: 2.0-3.0     Goal appropriate for indication? Goal INR 2-3, standard for indication(s) above     Time in Therapeutic Range (TTR)  (Goal > 60%) 93.3%       Office visit with referring provider's group within last year yes on 11/30/22       Soledad Resendiz RN  [facility]ticoagulation Clinic

## 2023-01-18 ENCOUNTER — MYC MEDICAL ADVICE (OUTPATIENT)
Dept: ANTICOAGULATION | Facility: CLINIC | Age: 38
End: 2023-01-18
Payer: COMMERCIAL

## 2023-01-18 NOTE — TELEPHONE ENCOUNTER
Hayden,     Our records show you were due to check your INR on 1/6/23.    Please check INR with your home meter and report results to the home monitoring company as soon as possible.       PATRICIA MENDEZ RN  [facility] Anticoagulation Management Program

## 2023-02-01 ENCOUNTER — ANTICOAGULATION THERAPY VISIT (OUTPATIENT)
Dept: ANTICOAGULATION | Facility: CLINIC | Age: 38
End: 2023-02-01
Payer: COMMERCIAL

## 2023-02-01 DIAGNOSIS — Z79.01 LONG TERM CURRENT USE OF ANTICOAGULANT THERAPY: ICD-10-CM

## 2023-02-01 DIAGNOSIS — I26.99 PULMONARY EMBOLISM WITH INFARCTION (H): Primary | ICD-10-CM

## 2023-02-01 DIAGNOSIS — D68.59 ANTITHROMBIN DEFICIENCY (H): ICD-10-CM

## 2023-02-01 DIAGNOSIS — Z86.711 HISTORY OF PULMONARY EMBOLISM: ICD-10-CM

## 2023-02-01 LAB — INR HOME MONITORING: 2.2 (ref 2–3)

## 2023-02-01 NOTE — PROGRESS NOTES
ANTICOAGULATION MANAGEMENT     Hayden HARVEY [place] 37 year old male is on warfarin with therapeutic INR result. (Goal INR 2.0-3.0)    Recent labs: (last 7 days)     02/01/23  0000   INR 2.2       ASSESSMENT       Source(s): Chart Review and Patient/Caregiver Call       Warfarin doses taken: patient reports he missed some warfarin in the past 2 weeks, but does now know when--he is adjusting to a new work schedule    Diet: No new diet changes identified    New illness, injury, or hospitalization: No    Medication/supplement changes: None noted    Signs or symptoms of bleeding or clotting: No    Previous INR: Therapeutic last 2(+) visits    Additional findings: None       PLAN     Recommended plan for no diet, medication or health factor changes affecting INR     Dosing Instructions: Continue your current warfarin dose with next INR in 2 weeks       Summary  As of 2/1/2023    Full warfarin instructions:  10 mg every Mon, Thu; 7.5 mg all other days   Next INR check:  2/15/2023             Telephone call with Hayden who agrees to plan and repeated back plan correctly    Patient to recheck with home meter    Education provided:     Contact [phone] with any changes, questions or concerns.     Plan made per [facility] anticoagulation protocol    Salud Bosch RN  Anticoagulation Clinic  2/1/2023    _______________________________________________________________________     Anticoagulation Episode Summary     Current INR goal:  2.0-3.0   TTR:  93.3 % (1 y)   Target end date:  Indefinite   Send INR reminders to:  [facility]    Indications    Pulmonary embolism with infarction (H) [I26.99] [I26.99]  Long-term (current) use of anticoagulants [Z79.01] [Z79.01]  Antithrombin deficiency (H) [D68.59]  History of pulmonary embolism [Z86.711]           Comments:  Prefers INR 2.5-3.0 goal range-ok with Hematology to aim for preferred goal range.  Okay to go 4 weeks in between INR's per Semaj 6/1/18         Anticoagulation Care  Providers     Provider Role Specialty Phone number    Sinan Lagunas MD Referring Hematology [phone]

## 2023-02-09 NOTE — PROGRESS NOTES
ANTICOAGULATION FOLLOW-UP CLINIC VISIT    Patient Name:  Hayden Ramires  Date:  2018  Contact Type:  Telephone    SUBJECTIVE:     Patient Findings     Positives:   No Problem Findings           OBJECTIVE    INR   Date Value Ref Range Status   2018 2.3  Final       ASSESSMENT / PLAN  No question data found.  Anticoagulation Summary  As of 2018    INR goal:   2.0-3.0   TTR:   90.5 % (2.5 y)   INR used for dosin.3 (2018)   Warfarin maintenance plan:   10 mg (5 mg x 2) every Thu; 7.5 mg (5 mg x 1.5) all other days   Full warfarin instructions:   10 mg every Thu; 7.5 mg all other days   Weekly warfarin total:   55 mg   Plan last modified:   Adam Lo, [facility] (9/15/2016)   Next INR check:   2019   Priority:   INR   Target end date:   Indefinite    Indications    Pulmonary embolism with infarction (H) [I26.99] [I26.99]  Long-term (current) use of anticoagulants [Z79.01] [Z79.01]             Anticoagulation Episode Summary     INR check location:       Preferred lab:       Send INR reminders to:   [facility]    Comments:   Okay to go 4 weeks in between INR's per Semaj 18  HIPPA INFO OK to speak with Mother Janae Mittal [phone]  May leave messages on cell phone  pt prefers to be closer to 3 than 2  Contact Ph [phone]      Anticoagulation Care Providers     Provider Role Specialty Phone number    Sinan Lagunas MD Responsible Hematology [phone]            See the Encounter Report to view Anticoagulation Flowsheet and Dosing Calendar (Go to Encounters tab in chart review, and find the Anticoagulation Therapy Visit)    Spoke with Hayden.     Soledad Resendiz RN                  4

## 2023-02-13 ENCOUNTER — LAB (OUTPATIENT)
Dept: LAB | Facility: CLINIC | Age: 38
End: 2023-02-13
Payer: COMMERCIAL

## 2023-02-13 DIAGNOSIS — Z30.2 ENCOUNTER FOR VASECTOMY: ICD-10-CM

## 2023-02-13 LAB
ABSTINENCE DAYS: 3 DAYS (ref 2–7)
AGGLUTINATION: NO
ANALYSIS TEMP - CENTIGRADE: 23 CENTIGRADE
COLLECTION METHOD: NORMAL
COLLECTION SITE: NORMAL
CONSENT TO RELEASE TO PARTNER: YES
DAL- RECEIVED TIME: NORMAL
LIQUEFIED: YES
ROUND CELLS: 0 MILLION/ML
SPECIMEN PH: 7.2 PH
SPECIMEN VOLUME: 2.8 ML
SPERM CONCENTRATION: 0 MILLION/ML
TIME OF ANALYSIS: NORMAL
TOTAL SPERM NUMBER: 0 MILLION
VISCOUS: NO

## 2023-02-13 PROCEDURE — 89321 SEMEN ANAL SPERM DETECTION: CPT

## 2023-02-14 NOTE — RESULT ENCOUNTER NOTE
Dear Hayden,     You are cleared for intercourse post-vasectomy.    Your semen analysis showed zero sperm.    Thank You!   Let me know if you have any questions.    Rosalio NORRIS

## 2023-02-28 ENCOUNTER — ANTICOAGULATION THERAPY VISIT (OUTPATIENT)
Dept: ANTICOAGULATION | Facility: CLINIC | Age: 38
End: 2023-02-28
Payer: COMMERCIAL

## 2023-02-28 DIAGNOSIS — D68.59 ANTITHROMBIN DEFICIENCY (H): ICD-10-CM

## 2023-02-28 DIAGNOSIS — I26.99 PULMONARY EMBOLISM WITH INFARCTION (H): Primary | ICD-10-CM

## 2023-02-28 DIAGNOSIS — Z79.01 LONG TERM CURRENT USE OF ANTICOAGULANT THERAPY: ICD-10-CM

## 2023-02-28 DIAGNOSIS — Z86.711 HISTORY OF PULMONARY EMBOLISM: ICD-10-CM

## 2023-02-28 LAB — INR HOME MONITORING: 2.6 (ref 2–3)

## 2023-02-28 NOTE — PROGRESS NOTES
ANTICOAGULATION MANAGEMENT     Hayden HARVEY [place] 37 year old male is on warfarin with therapeutic INR result. (Goal INR 2.0-3.0)    Recent labs: (last 7 days)     02/28/23  0000   INR 2.6       ASSESSMENT       Source(s): Chart Review    Previous INR was Therapeutic last 2(+) visits    Medication, diet, health changes since last INR chart reviewed; none identified             PLAN     Recommended plan for no diet, medication or health factor changes affecting INR     Dosing Instructions: Continue your current warfarin dose with next INR in 4 weeks       Summary  As of 2/28/2023    Full warfarin instructions:  10 mg every Mon, Thu; 7.5 mg all other days   Next INR check:  3/28/2023             Detailed voice message left for Hayden with dosing instructions and follow up date.   Sent [employer] message with dosing and follow up instructions    Patient to recheck with home meter    Education provided:     Please call back if any changes to your diet, medications or how you've been taking warfarin    Contact [phone] with any changes, questions or concerns.     Plan made per [facility] anticoagulation protocol    PATRICIA MENDEZ RN  Anticoagulation Clinic  2/28/2023    _______________________________________________________________________     Anticoagulation Episode Summary     Current INR goal:  2.0-3.0   TTR:  93.3 % (1 y)   Target end date:  Indefinite   Send [facility]R reminders [facility]    Indications    Pulmonary embolism with infarction (H) [I26.99] [I26.99]  Long-term (current) use of anticoagulants [Z79.01] [Z79.01]  Antithrombin deficiency (H) [D68.59]  History of pulmonary embolism [Z86.711]           Comments:  Prefers INR 2.5-3.0 goal range-ok with Hematology to aim for preferred goal range.  Okay to go 4 weeks in between INR's per Semaj 6/1/18         Anticoagulation Care Providers     Provider Role Specialty Phone number    Sinan Lagunas MD Referring Hematology [phone]

## 2023-04-06 ENCOUNTER — MYC MEDICAL ADVICE (OUTPATIENT)
Dept: ANTICOAGULATION | Facility: CLINIC | Age: 38
End: 2023-04-06
Payer: COMMERCIAL

## 2023-04-06 NOTE — TELEPHONE ENCOUNTER
Hayden,     Our records show you were due to check your INR on 3/28/23.    Please check INR with your home meter and report results to the home monitoring company as soon as possible.       PATRICIA MENDEZ RN  [facility] Anticoagulation Management Program

## 2023-04-11 ENCOUNTER — ANTICOAGULATION THERAPY VISIT (OUTPATIENT)
Dept: ANTICOAGULATION | Facility: CLINIC | Age: 38
End: 2023-04-11
Payer: COMMERCIAL

## 2023-04-11 DIAGNOSIS — D68.59 ANTITHROMBIN DEFICIENCY (H): ICD-10-CM

## 2023-04-11 DIAGNOSIS — Z79.01 LONG TERM CURRENT USE OF ANTICOAGULANT THERAPY: ICD-10-CM

## 2023-04-11 DIAGNOSIS — I26.99 PULMONARY EMBOLISM WITH INFARCTION (H): Primary | ICD-10-CM

## 2023-04-11 DIAGNOSIS — Z86.711 HISTORY OF PULMONARY EMBOLISM: ICD-10-CM

## 2023-04-11 LAB — INR HOME MONITORING: 2.1 (ref 2–3)

## 2023-04-11 NOTE — PROGRESS NOTES
ANTICOAGULATION MANAGEMENT     Hayden HARVEY [place] 37 year old male is on warfarin with therapeutic INR result. (Goal INR 2.0-3.0)    Recent labs: (last 7 days)     04/11/23  0000   INR 2.1       ASSESSMENT       Source(s): Chart Review and Patient/Caregiver Call       Warfarin doses taken: Warfarin taken as instructed    Diet: No new diet changes identified    New illness, injury, or hospitalization: No    Medication/supplement changes: None noted    Signs or symptoms of bleeding or clotting: No    Previous INR: Therapeutic last 2(+) visits    Additional findings: Per chart review pt prefers to keep INR closer to 3, discussed with patient today and he states that he has been feeling good and prefers to stay with current maintenance dose and will recheck in 4 weeks with home meter. If INR still on lower end of goal range, can discuss potential increase at that time.          PLAN     Recommended plan for no diet, medication or health factor changes affecting INR     Dosing Instructions: Continue your current warfarin dose with next INR in 4 weeks       Summary  As of 4/11/2023    Full warfarin instructions:  10 mg every Mon, Thu; 7.5 mg all other days   Next INR check:  5/9/2023             Telephone call with Hayden who verbalizes understanding and agrees to plan    Patient to recheck with home meter    Education provided:     Contact [phone] with any changes, questions or concerns.     Plan made per [facility]agulation protocol    Jonas Sexton RN  Anticoagulation Clinic  4/11/2023    _______________________________________________________________________     Anticoagulation Episode Summary     Current INR goal:  2.0-3.0   TTR:  99.5 % (1 y)   Target end date:  Indefinite   Send INR reminders to:  [facility]    Indications    Pulmonary embolism with infarction (H) [I26.99] [I26.99]  Long-term (current) use of anticoagulants [Z79.01] [Z79.01]  Antithrombin deficiency (H) [D68.59]  History of pulmonary  embolism [Z86.711]           Comments:  Prefers INR 2.5-3.0 goal range-ok with Hematology to aim for preferred goal range.  Okay to go 4 weeks in between INR's per Semaj 6/1/18         Anticoagulation Care Providers     Provider Role Specialty Phone number    Sinan Lagunas MD Referring Hematology [phone]

## 2023-05-16 ENCOUNTER — MYC MEDICAL ADVICE (OUTPATIENT)
Dept: ANTICOAGULATION | Facility: CLINIC | Age: 38
End: 2023-05-16
Payer: COMMERCIAL

## 2023-05-16 NOTE — TELEPHONE ENCOUNTER
Hayden,     Our records show you were due to check your INR on 5/9/23.    Please check INR with your home meter and report results to the home monitoring company as soon as possible.       PATRICIA MENDEZ RN  [facility] Anticoagulation Management Program

## 2023-05-23 ENCOUNTER — MYC MEDICAL ADVICE (OUTPATIENT)
Dept: ANTICOAGULATION | Facility: CLINIC | Age: 38
End: 2023-05-23
Payer: COMMERCIAL

## 2023-05-23 NOTE — TELEPHONE ENCOUNTER
Hayden,     Our records show you were due to check your INR on 5/9/23.    Please check INR with your home meter and report results to the home monitoring company as soon as possible.       PATRICIA MENDEZ RN  [facility] Anticoagulation Management Program

## 2023-05-27 ENCOUNTER — HEALTH MAINTENANCE LETTER (OUTPATIENT)
Age: 38
End: 2023-05-27

## 2023-05-31 ENCOUNTER — DOCUMENTATION ONLY (OUTPATIENT)
Dept: ANTICOAGULATION | Facility: CLINIC | Age: 38
End: 2023-05-31
Payer: COMMERCIAL

## 2023-05-31 NOTE — PROGRESS NOTES
ANTICOAGULATION     Hayden Ramires is overdue for INR check.      Reminder letter sent    PATRICIA MENDEZ RN

## 2023-05-31 NOTE — LETTER
[facility]TICO[facility]UL[facility]  [address] [facility] [address]  Phone: [phone]  Fax: [phone]   May 31, 2023        Hayden Ramires  [address] [place] [id number]            Dear Hayden,    You are currently under the care of [facility] Anticoagulation Management Program for your warfarin (Coumadin , Jantoven ) therapy.  We are contacting you because our records show you were due for an INR on 5/9/23.    There are potentially serious risks when taking warfarin without careful monitoring and we want to make sure you are safely managed.  Routine lab monitoring is required for warfarin refills.     Please call [phone] as soon as possible to schedule an appointment.  If there has been a change in your care or other concerns, please let us know so we can help and or update our records.     Sincerely,       [facility] Anticoagulation Management Program

## 2023-06-01 ENCOUNTER — ANTICOAGULATION THERAPY VISIT (OUTPATIENT)
Dept: ANTICOAGULATION | Facility: CLINIC | Age: 38
End: 2023-06-01
Payer: COMMERCIAL

## 2023-06-01 DIAGNOSIS — I26.99 PULMONARY EMBOLISM WITH INFARCTION (H): Primary | ICD-10-CM

## 2023-06-01 DIAGNOSIS — Z79.01 LONG TERM CURRENT USE OF ANTICOAGULANT THERAPY: ICD-10-CM

## 2023-06-01 DIAGNOSIS — D68.59 ANTITHROMBIN DEFICIENCY (H): ICD-10-CM

## 2023-06-01 DIAGNOSIS — Z86.711 HISTORY OF PULMONARY EMBOLISM: ICD-10-CM

## 2023-06-01 LAB — INR HOME MONITORING: 2.9 (ref 2–3)

## 2023-06-01 NOTE — PROGRESS NOTES
ANTICOAGULATION MANAGEMENT     Hayden HARVEY [place] 37 year old male is on warfarin with therapeutic INR result. (Goal INR 2.0-3.0)    Recent labs: (last 7 days)     06/01/23  0000   INR 2.9       ASSESSMENT       Source(s): Chart Review       Warfarin doses taken: Reviewed in chart    Diet: No new diet changes identified    Medication/supplement changes: None noted    New illness, injury, or hospitalization: No    Signs or symptoms of bleeding or clotting: No    Previous result: Therapeutic last 2(+) visits    Additional findings: None         PLAN     Recommended plan for no diet, medication or health factor changes affecting INR     Dosing Instructions: Continue your current warfarin dose with next INR in 4 weeks       Summary  As of 6/1/2023    Full warfarin instructions:  10 mg every Mon, Thu; 7.5 mg all other days   Next INR check:  6/29/2023             Detailed voice message left for Hayden with dosing instructions and follow up date.     Patient to recheck with home meter    Education provided:     Contact [phone] with any changes, questions or concerns.     Plan made per [facility] anticoagulation protocol    Salud Bosch RN  Anticoagulation Clinic  6/1/2023    _______________________________________________________________________     Anticoagulation Episode Summary     Current INR goal:  2.0-3.0   TTR:  100.0 % (1 y)   Target end date:  Indefinite   Send INR reminders to:  [facility]    Indications    Pulmonary embolism with infarction (H) [I26.99] [I26.99]  Long-term (current) use of anticoagulants [Z79.01] [Z79.01]  Antithrombin deficiency (H) [D68.59]  History of pulmonary embolism [Z86.711]           Comments:  Prefers INR 2.5-3.0 goal range-ok with Hematology to aim for preferred goal range.  Okay to go 4 weeks in between INR's per Semaj 6/1/18         Anticoagulation Care Providers     Provider Role Specialty Phone number    Sinan Lagunas MD Referring Hematology [phone]

## 2023-07-10 ENCOUNTER — MYC MEDICAL ADVICE (OUTPATIENT)
Dept: ANTICOAGULATION | Facility: CLINIC | Age: 38
End: 2023-07-10
Payer: COMMERCIAL

## 2023-07-10 NOTE — TELEPHONE ENCOUNTER
Hayden,     Our records show you were due to check your INR on 6/29/23.    Please check INR with your home meter and report results to the home monitoring company as soon as possible.       PATRICIA MENDEZ RN  [facility] Anticoagulation Management Program

## 2023-07-11 ENCOUNTER — ANTICOAGULATION THERAPY VISIT (OUTPATIENT)
Dept: ANTICOAGULATION | Facility: CLINIC | Age: 38
End: 2023-07-11
Payer: COMMERCIAL

## 2023-07-11 DIAGNOSIS — I26.99 PULMONARY EMBOLISM WITH INFARCTION (H): Primary | ICD-10-CM

## 2023-07-11 DIAGNOSIS — D68.59 ANTITHROMBIN DEFICIENCY (H): ICD-10-CM

## 2023-07-11 DIAGNOSIS — Z79.01 LONG TERM CURRENT USE OF ANTICOAGULANT THERAPY: ICD-10-CM

## 2023-07-11 DIAGNOSIS — Z86.711 HISTORY OF PULMONARY EMBOLISM: ICD-10-CM

## 2023-07-11 LAB — INR HOME MONITORING: 2.8 (ref 2–3)

## 2023-07-11 NOTE — PROGRESS NOTES
ANTICOAGULATION MANAGEMENT     Hayden HARVEY [place] 37 year old male is on warfarin with therapeutic INR result. (Goal INR 2.0-3.0)    Recent labs: (last 7 days)     07/11/23  0000   INR 2.8       ASSESSMENT     Source(s): Chart Review and Patient/Caregiver Call     Warfarin doses taken: Warfarin taken as instructed  Diet: No new diet changes identified  Medication/supplement changes: None noted  New illness, injury, or hospitalization: No  Signs or symptoms of bleeding or clotting: No  Previous result: Therapeutic last 2(+) visits  Additional findings: None       PLAN     Recommended plan for no diet, medication or health factor changes affecting INR     Dosing Instructions: Continue your current warfarin dose with next INR in 4 weeks       Summary  As of 7/11/2023      Full warfarin instructions:  10 mg every Mon, Thu; 7.5 mg all other days   Next INR check:  8/8/2023               Telephone call with Hayden who verbalizes understanding and agrees to plan    Patient to recheck with home meter    Education provided:   Contact [phone] with any changes, questions or concerns.     Plan made per [facility] anticoagulation protocol    Perla Barton RN  Anticoagulation Clinic  7/11/2023    _______________________________________________________________________     Anticoagulation Episode Summary       Current INR goal:  2.0-3.0   TTR:  100.0 % (1 y)   Target end date:  Indefinite   Send INR reminders to:  [facility]    Indications    Pulmonary embolism with infarction (H) [I26.99] [I26.99]  Long-term (current) use of anticoagulants [Z79.01] [Z79.01]  Antithrombin deficiency (H) [D68.59]  History of pulmonary embolism [Z86.711]             Comments:  Prefers INR 2.5-3.0 goal range-ok with Hematology to aim for preferred goal range.  Okay to go 4 weeks in between INR's per Semaj 6/1/18             Anticoagulation Care Providers       Provider Role Specialty Phone number    Sinan Lagunas MD Referring Hematology  [phone]

## 2023-08-15 ENCOUNTER — MYC MEDICAL ADVICE (OUTPATIENT)
Dept: ANTICOAGULATION | Facility: CLINIC | Age: 38
End: 2023-08-15
Payer: COMMERCIAL

## 2023-08-15 NOTE — TELEPHONE ENCOUNTER
Hayden,     Our records show you were due to check your INR on 8/8/23.    Please check INR with your home meter and report results to the home monitoring company as soon as possible.       PATRICIA MENDEZ RN  [facility] Anticoagulation Management [facility]

## 2023-08-22 ENCOUNTER — ANTICOAGULATION THERAPY VISIT (OUTPATIENT)
Dept: ANTICOAGULATION | Facility: CLINIC | Age: 38
End: 2023-08-22
Payer: COMMERCIAL

## 2023-08-22 DIAGNOSIS — I26.99 PULMONARY EMBOLISM WITH INFARCTION (H): Primary | ICD-10-CM

## 2023-08-22 DIAGNOSIS — D68.59 ANTITHROMBIN DEFICIENCY (H): ICD-10-CM

## 2023-08-22 DIAGNOSIS — Z86.711 HISTORY OF PULMONARY EMBOLISM: ICD-10-CM

## 2023-08-22 DIAGNOSIS — Z79.01 LONG TERM CURRENT USE OF ANTICOAGULANT THERAPY: ICD-10-CM

## 2023-08-22 LAB — INR HOME MONITORING: 2.9 (ref 2–3)

## 2023-08-22 NOTE — PROGRESS NOTES
ANTICOAGULATION MANAGEMENT     Hayden HARVEY [place] 37 year old male is on warfarin with therapeutic INR result. (Goal INR 2.0-3.0)    Recent labs: (last 7 days)     08/22/23  0000   INR 2.9       ASSESSMENT     Source(s): Chart Review and Patient/Caregiver Call     Warfarin doses taken: Warfarin taken as instructed  Diet: No new diet changes identified  Medication/supplement changes: None noted  New illness, injury, or hospitalization: No  Signs or symptoms of bleeding or clotting: No  Previous result: Therapeutic last 2(+) visits  Additional findings: None       PLAN     Recommended plan for no diet, medication or health factor changes affecting INR     Dosing Instructions: Continue your current warfarin dose with next INR in 4 weeks       Summary  As of 8/22/2023      Full warfarin instructions:  10 mg every Mon, Thu; 7.5 mg all other days   Next INR check:  9/19/2023               Telephone call with Hayden who verbalizes understanding and agrees to plan and who agrees to plan and repeated back plan correctly    Patient to recheck with home meter    Education provided:   None required    Plan made per [facility] anticoagulation protocol    Thierry Martinez, RN  Anticoagulation Clinic  8/22/2023    _______________________________________________________________________     Anticoagulation Episode Summary       Current INR goal:  2.0-3.0   TTR:  100.0 % (1 y)   Target end date:  Indefinite   Send INR reminders to:  [facility]    Indications    Pulmonary embolism with infarction (H) [I26.99] [I26.99]  Long-term (current) use of anticoagulants [Z79.01] [Z79.01]  Antithrombin deficiency (H) [D68.59]  History of pulmonary embolism [Z86.711]             Comments:  Prefers INR 2.5-3.0 goal range-ok with Hematology to aim for preferred goal range.  Okay to go 4 weeks in between INR's per Semaj 6/1/18             Anticoagulation Care Providers       Provider Role Specialty Phone number    Sinan Lagunas MD Referring  Hematology [phone]

## 2023-10-02 ENCOUNTER — MYC MEDICAL ADVICE (OUTPATIENT)
Dept: ANTICOAGULATION | Facility: CLINIC | Age: 38
End: 2023-10-02
Payer: COMMERCIAL

## 2023-10-03 ENCOUNTER — ANTICOAGULATION THERAPY VISIT (OUTPATIENT)
Dept: ANTICOAGULATION | Facility: CLINIC | Age: 38
End: 2023-10-03
Payer: COMMERCIAL

## 2023-10-03 DIAGNOSIS — I26.99 PULMONARY EMBOLISM WITH INFARCTION (H): Primary | ICD-10-CM

## 2023-10-03 DIAGNOSIS — Z86.711 HISTORY OF PULMONARY EMBOLISM: ICD-10-CM

## 2023-10-03 DIAGNOSIS — Z79.01 LONG TERM CURRENT USE OF ANTICOAGULANT THERAPY: ICD-10-CM

## 2023-10-03 DIAGNOSIS — D68.59 ANTITHROMBIN DEFICIENCY (H): ICD-10-CM

## 2023-10-03 DIAGNOSIS — Z79.01 CHRONIC ANTICOAGULATION: ICD-10-CM

## 2023-10-03 LAB — INR HOME MONITORING: 3 (ref 2–3)

## 2023-10-03 RX ORDER — WARFARIN SODIUM 7.5 MG/1
TABLET ORAL
Qty: 65 TABLET | Refills: 0 | Status: SHIPPED | OUTPATIENT
Start: 2023-10-03 | End: 2023-11-28

## 2023-10-03 RX ORDER — WARFARIN SODIUM 10 MG/1
TABLET ORAL
Qty: 28 TABLET | Refills: 0 | Status: SHIPPED | OUTPATIENT
Start: 2023-10-03 | End: 2023-10-26

## 2023-10-03 NOTE — PROGRESS NOTES
ANTICOAGULATION MANAGEMENT     Hayden HARVEY [place] 38 year old male is on warfarin with therapeutic INR result. (Goal INR 2.0-3.0)    Recent labs: (last 7 days)     10/03/23  0000   INR 3.0       ASSESSMENT     Source(s): Chart Review and Patient/Caregiver Call     Warfarin doses taken: Warfarin taken as instructed  Diet: No new diet changes identified  Medication/supplement changes: None noted  New illness, injury, or hospitalization: No  Signs or symptoms of bleeding or clotting: No  Previous result: Therapeutic last 2(+) visits  Additional findings: Refill needed today. Hayden meets all criteria for refill (current [facility] referral, office visit with referring provider/group in last 1 year unless directed to return in 2 years in last referring provider visit note, lab monitoring up to date or not exceeding 2 weeks overdue). Rx instructions and quantity supplied updated to match patient's current dosing plan.  90 day supply with 0 refills granted per [facility] protocol, Hayden is coming up due for his annual visit with hematology needed in November. He hasn't scheduled yet, advised him to do so at his convenience so that additional refills can be provided. He verbalized understanding.       PLAN     Recommended plan for no diet, medication or health factor changes affecting INR     Dosing Instructions: Continue your current warfarin dose with next INR in 4 weeks       Summary  As of 10/3/2023      Full warfarin instructions:  10 mg every Mon, Thu; 7.5 mg all other days   Next INR check:  10/31/2023               Telephone call with Hayden who verbalizes understanding and agrees to plan    Patient to recheck with home meter    Education provided:   Contact [phone] with any changes, questions or concerns.     Plan made per [facility] anticoagulation protocol    Perla Barton RN  Anticoagulation Clinic  10/3/2023    _______________________________________________________________________     Anticoagulation Episode Summary        Current INR goal:  2.0-3.0   TTR:  100.0 % (1 y)   Target end date:  Indefinite   Send INR reminders to:  [facility]    Indications    Pulmonary embolism with infarction (H) [I26.99] [I26.99]  Long-term (current) use of anticoagulants [Z79.01] [Z79.01]  Antithrombin deficiency (H24) [D68.59]  History of pulmonary embolism [Z86.711]             Comments:  Prefers INR 2.5-3.0 goal range-ok with Hematology to aim for preferred goal range.  Okay to go 4 weeks in between INR's per Semaj 6/1/18             Anticoagulation Care Providers       Provider Role Specialty Phone number    Sinan Lagunas MD Referring Hematology [phone]

## 2023-10-24 ENCOUNTER — ANTICOAGULATION THERAPY VISIT (OUTPATIENT)
Dept: ANTICOAGULATION | Facility: CLINIC | Age: 38
End: 2023-10-24
Payer: COMMERCIAL

## 2023-10-24 DIAGNOSIS — Z86.711 HISTORY OF PULMONARY EMBOLISM: ICD-10-CM

## 2023-10-24 DIAGNOSIS — I26.99 PULMONARY EMBOLISM WITH INFARCTION (H): Primary | ICD-10-CM

## 2023-10-24 DIAGNOSIS — D68.59 ANTITHROMBIN DEFICIENCY (H): ICD-10-CM

## 2023-10-24 DIAGNOSIS — Z79.01 LONG TERM CURRENT USE OF ANTICOAGULANT THERAPY: ICD-10-CM

## 2023-10-24 LAB — INR HOME MONITORING: 1.3 (ref 2–3)

## 2023-10-24 NOTE — PROGRESS NOTES
ANTICOAGULATION MANAGEMENT     Hayden HARVEY [place] 38 year old male is on warfarin with subtherapeutic INR result. (Goal INR 2.0-3.0)    Recent labs: (last 7 days)     10/24/23  0000   INR 1.3*       ASSESSMENT     Source(s): Patient/Caregiver Call     Warfarin doses taken: Warfarin taken as instructed  Diet: No new diet changes identified  Medication/supplement changes:  Finished Z-Jordan and today will start Prednisone for 6 days  New illness, injury, or hospitalization: Yes: patient was diagnosed today with bronchitis   Signs or symptoms of bleeding or clotting: No  Previous result: Therapeutic last 2(+) visits  Additional findings:  Patient denies any missed doses or increased diet with vitamin k rich foods. Will boost dose today, but due to Prednisone having the potential to increase an INR will leave dosing alone.       PLAN     Recommended plan for ongoing change(s) affecting INR     Dosing Instructions: booster dose then continue your current warfarin dose with next INR in 3 days       Summary  As of 10/24/2023      Full warfarin instructions:  10/24: 15 mg; Otherwise 10 mg every Mon, Thu; 7.5 mg all other days   Next INR check:  10/27/2023               Telephone call with Hayden who verbalizes understanding and agrees to plan and who agrees to plan and repeated back plan correctly    Patient to recheck with home meter    Education provided:   Interaction IS anticipated between warfarin and Prednisone and will watch INR's closely     Plan made per [facility] anticoagulation protocol    Ana Wong RN  Anticoagulation Clinic  10/24/2023    _______________________________________________________________________     Anticoagulation Episode Summary       Current INR goal:  2.0-3.0   TTR:  97.6% (1 y)   Target end date:  Indefinite   Send INR reminders to:  [facility]    Indications    Pulmonary embolism with infarction (H) [I26.99] [I26.99]  Long-term (current) use of anticoagulants [Z79.01] [Z79.01]  Antithrombin  deficiency (H24) [D68.59]  History of pulmonary embolism [Z86.711]             Comments:  Prefers INR 2.5-3.0 goal range-ok with Hematology to aim for preferred goal range.  Okay to go 4 weeks in between INR's per Semaj 6/1/18             Anticoagulation Care Providers       Provider Role Specialty Phone number    Sinan Lagunas MD Referring Hematology [phone]

## 2023-10-24 NOTE — PROGRESS NOTES
Pt called and reported that he has been ill with an URI. He reports that he finished a z-jihan on 10/21/23. Pt reports that he was just given a prescription for prednisone and will be starting that today. Pt wondering when to check INR. Pt currently scheduled to test on 10/31/23 with home meter.    Pt advised to either test today or tomorrow. Pt states that he will try to get home tonight to test before 5 pm. Pt advised that he can always call the on call doctor if the INR is really out of range and it is after hours.    Pt advised that zithromycin can increase INR and prednisone can increase or sometimes decrease INR levels.

## 2023-10-26 ENCOUNTER — TELEPHONE (OUTPATIENT)
Dept: HEMATOLOGY | Facility: CLINIC | Age: 38
End: 2023-10-26
Payer: COMMERCIAL

## 2023-10-26 DIAGNOSIS — Z79.01 CHRONIC ANTICOAGULATION: ICD-10-CM

## 2023-10-26 RX ORDER — WARFARIN SODIUM 10 MG/1
TABLET ORAL
Qty: 28 TABLET | Refills: 1 | Status: SHIPPED | OUTPATIENT
Start: 2023-10-26 | End: 2023-11-20

## 2023-10-26 NOTE — PROGRESS NOTES
Hayden Ramires is followed at [facility] for their history of VTE.     They were last evaluated by our team on 11/28/22 with the plan to remain on long term anticoagulation and return to clinic in 1 year.    Prescription updated and refills given x 2 months.    This message will be routed to  for scheduling.    Beena Silva, MSN, RN, PHN -Nurse Clinician, [facility] [phone]

## 2023-10-27 ENCOUNTER — ANTICOAGULATION THERAPY VISIT (OUTPATIENT)
Dept: ANTICOAGULATION | Facility: CLINIC | Age: 38
End: 2023-10-27
Payer: COMMERCIAL

## 2023-10-27 DIAGNOSIS — I26.99 PULMONARY EMBOLISM WITH INFARCTION (H): Primary | ICD-10-CM

## 2023-10-27 DIAGNOSIS — D68.59 ANTITHROMBIN DEFICIENCY (H): ICD-10-CM

## 2023-10-27 DIAGNOSIS — Z79.01 LONG TERM CURRENT USE OF ANTICOAGULANT THERAPY: ICD-10-CM

## 2023-10-27 DIAGNOSIS — Z86.711 HISTORY OF PULMONARY EMBOLISM: ICD-10-CM

## 2023-10-27 LAB — INR HOME MONITORING: 3.1 (ref 2–3)

## 2023-10-27 NOTE — PROGRESS NOTES
ANTICOAGULATION MANAGEMENT     Hayden HARVEY [place] 38 year old male is on warfarin with supratherapeutic INR result. (Goal INR 2.0-3.0)    Recent labs: (last 7 days)     10/27/23  0000   INR 3.1*       ASSESSMENT     Source(s): Patient/Caregiver Call     Warfarin doses taken: Booster dose(s) recently taken which may be affecting INR  Diet: No new diet changes identified  Medication/supplement changes:  patient continues with Prednisone and last dose is on Otf 10/29  New illness, injury, or hospitalization: No  Signs or symptoms of bleeding or clotting: No  Previous result: Subtherapeutic  Additional findings: None       PLAN     Recommended plan for ongoing change(s) affecting INR     Dosing Instructions: Continue your current warfarin dose with next INR in 1 week       Summary  As of 10/27/2023      Full warfarin instructions:  10 mg every Mon, Thu; 7.5 mg all other days   Next INR check:  11/3/2023               Telephone call with Hayden who verbalizes understanding and agrees to plan and who agrees to plan and repeated back plan correctly    Patient to recheck with home meter    Education provided:   None required    Plan made with [facility] Pharmacist Dora Wong, BRAYDEN  Anticoagulation Clinic  10/27/2023    _______________________________________________________________________     Anticoagulation Episode Summary       Current INR goal:  2.0-3.0   TTR:  97.3% (1 y)   Target end date:  Indefinite   Send INR reminders to:  [facility]    Indications    Pulmonary embolism with infarction (H) [I26.99] [I26.99]  Long-term (current) use of anticoagulants [Z79.01] [Z79.01]  Antithrombin deficiency (H24) [D68.59]  History of pulmonary embolism [Z86.711]             Comments:  Prefers INR 2.5-3.0 goal range-ok with Hematology to aim for preferred goal range.  Okay to go 4 weeks in between INR's per Semaj 6/1/18             Anticoagulation Care Providers       Provider Role Specialty Phone number     Sinan Lagunas MD Referring Hematology [phone]

## 2023-11-03 ENCOUNTER — ANTICOAGULATION THERAPY VISIT (OUTPATIENT)
Dept: ANTICOAGULATION | Facility: CLINIC | Age: 38
End: 2023-11-03
Payer: COMMERCIAL

## 2023-11-03 DIAGNOSIS — Z86.711 HISTORY OF PULMONARY EMBOLISM: ICD-10-CM

## 2023-11-03 DIAGNOSIS — D68.59 ANTITHROMBIN DEFICIENCY (H): ICD-10-CM

## 2023-11-03 DIAGNOSIS — Z79.01 LONG TERM CURRENT USE OF ANTICOAGULANT THERAPY: ICD-10-CM

## 2023-11-03 DIAGNOSIS — I26.99 PULMONARY EMBOLISM WITH INFARCTION (H): Primary | ICD-10-CM

## 2023-11-03 LAB — INR HOME MONITORING: 2.9 (ref 2–3)

## 2023-11-03 NOTE — PROGRESS NOTES
ANTICOAGULATION MANAGEMENT     Hayden Nguyent 38 year old male is on warfarin with therapeutic INR result. (Goal INR 2.0-3.0)    Recent labs: (last 7 days)     11/03/23  0000   INR 2.9       ASSESSMENT     Source(s): Chart Review and Patient/Caregiver Call     Warfarin doses taken: Warfarin taken as instructed  Diet: No new diet changes identified  Medication/supplement changes:  has finished prednisone  New illness, injury, or hospitalization: No  Signs or symptoms of bleeding or clotting: No  Previous result: Supratherapeutic  Additional findings: None       PLAN     Recommended plan for no diet, medication or health factor changes affecting INR     Dosing Instructions: Continue your current warfarin dose with next INR in 2 - 3 weeks       Summary  As of 11/3/2023      Full warfarin instructions:  10 mg every Mon, Thu; 7.5 mg all other days   Next INR check:  11/24/2023               Telephone call with Hayden who verbalizes understanding and agrees to plan    Patient offered & declined to schedule next visit    Education provided:   Contact [phone] with any changes, questions or concerns.     Plan made per [facility] protocol    Salud Bosch RN  Anticoagulation Clinic  11/3/2023    _______________________________________________________________________     Anticoagulation Episode Summary       Current INR goal:  2.0-3.0   TTR:  96.3% (1 y)   Target end date:  Indefinite   Send INR reminders to:  [facility]    Indications    Pulmonary embolism with infarction (H) [I26.99] [I26.99]  Long-term (current) use of anticoagulants [Z79.01] [Z79.01]  Antithrombin deficiency (H24) [D68.59]  History of pulmonary embolism [Z86.711]             Comments:  Prefers INR 2.5-3.0 goal range-ok with Hematology to aim for preferred goal range.  Okay to go 4 weeks in between INR's per Semaj 6/1/18             Anticoagulation Care Providers       Provider Role Specialty Phone number    Sinan Lagunas,  MD Referring Hematology [phone]

## 2023-11-20 DIAGNOSIS — Z79.01 LONG TERM CURRENT USE OF ANTICOAGULANT THERAPY: ICD-10-CM

## 2023-11-20 DIAGNOSIS — Z79.01 CHRONIC ANTICOAGULATION: ICD-10-CM

## 2023-11-20 RX ORDER — WARFARIN SODIUM 10 MG/1
TABLET ORAL
Qty: 28 TABLET | Refills: 1 | Status: SHIPPED | OUTPATIENT
Start: 2023-11-20 | End: 2023-11-28

## 2023-11-20 NOTE — PROGRESS NOTES
Fax received from [facility] for warfarin. Patient has upcoming appt scheduled with Sofya. Refill granted    Alba GODFREYN, RN, PHN   [facility]   Office: [phone]  Fax: [phone]

## 2023-11-22 ENCOUNTER — ANTICOAGULATION THERAPY VISIT (OUTPATIENT)
Dept: ANTICOAGULATION | Facility: CLINIC | Age: 38
End: 2023-11-22
Payer: COMMERCIAL

## 2023-11-22 DIAGNOSIS — Z86.711 HISTORY OF PULMONARY EMBOLISM: ICD-10-CM

## 2023-11-22 DIAGNOSIS — Z79.01 LONG TERM CURRENT USE OF ANTICOAGULANT THERAPY: ICD-10-CM

## 2023-11-22 DIAGNOSIS — I26.99 PULMONARY EMBOLISM WITH INFARCTION (H): Primary | ICD-10-CM

## 2023-11-22 DIAGNOSIS — D68.59 ANTITHROMBIN DEFICIENCY (H): ICD-10-CM

## 2023-11-22 LAB — INR HOME MONITORING: 2.8 (ref 2–3)

## 2023-11-22 NOTE — PROGRESS NOTES
ANTICOAGULATION MANAGEMENT     Hayden HARVEY [place] 38 year old male is on warfarin with therapeutic INR result. (Goal INR 2.0-3.0)    Recent labs: (last 7 days)     11/22/23  0000   INR 2.8       ASSESSMENT     Source(s): Chart Review and Patient/Caregiver Call     Warfarin doses taken: Warfarin taken as instructed  Diet: No new diet changes identified  Medication/supplement changes: None noted  New illness, injury, or hospitalization: Yes: still has cold symptoms; only taking an occasional Dayquil or Nyquil  Signs or symptoms of bleeding or clotting: No  Previous result: Therapeutic last visit; previously outside of goal range  Additional findings: None       PLAN     Recommended plan for no diet, medication or health factor changes affecting INR     Dosing Instructions: Continue your current warfarin dose with next INR in 4 weeks       Summary  As of 11/22/2023      Full warfarin instructions:  10 mg every Mon, Thu; 7.5 mg all other days   Next INR check:  12/20/2023               Telephone call with Hayden who verbalizes understanding and agrees to plan    Patient to recheck with home meter    Education provided:   Contact [phone] with any changes, questions or concerns.     Plan made per [facility] protocol    Salud Bosch RN  Anticoagulation Clinic  11/22/2023    _______________________________________________________________________     Anticoagulation Episode Summary       Current INR goal:  2.0-3.0   TTR:  96.3% (1 y)   Target end date:  Indefinite   Send INR reminders to:  [facility]    Indications    Pulmonary embolism with infarction (H) [I26.99] [I26.99]  Long-term (current) use of anticoagulants [Z79.01] [Z79.01]  Antithrombin deficiency (H24) [D68.59]  History of pulmonary embolism [Z86.711]             Comments:  Prefers INR 2.5-3.0 goal range-ok with Hematology to aim for preferred goal range.  Okay to go 4 weeks in between INR's per Semaj 6/1/18             Anticoagulation  Care Providers       Provider Role Specialty Phone number    Sinan Lagunas MD Referring Hematology [phone]

## 2023-11-28 ENCOUNTER — VIRTUAL VISIT (OUTPATIENT)
Dept: HEMATOLOGY | Facility: CLINIC | Age: 38
End: 2023-11-28
Attending: PHYSICIAN ASSISTANT
Payer: COMMERCIAL

## 2023-11-28 DIAGNOSIS — Z79.01 CHRONIC ANTICOAGULATION: ICD-10-CM

## 2023-11-28 DIAGNOSIS — Z79.01 LONG TERM CURRENT USE OF ANTICOAGULANT THERAPY: ICD-10-CM

## 2023-11-28 PROCEDURE — 99212 OFFICE O/P EST SF 10 MIN: CPT | Mod: 95 | Performed by: PHYSICIAN ASSISTANT

## 2023-11-28 RX ORDER — WARFARIN SODIUM 7.5 MG/1
TABLET ORAL
Qty: 65 TABLET | Refills: 11 | Status: SHIPPED | OUTPATIENT
Start: 2023-11-28

## 2023-11-28 RX ORDER — WARFARIN SODIUM 10 MG/1
TABLET ORAL
Qty: 28 TABLET | Refills: 11 | Status: SHIPPED | OUTPATIENT
Start: 2023-11-28

## 2023-11-28 RX ORDER — ALBUTEROL SULFATE 90 UG/1
AEROSOL, METERED RESPIRATORY (INHALATION)
COMMUNITY
Start: 2023-10-16

## 2023-11-28 NOTE — PROGRESS NOTES
[facility]  [facility]  [facility] [address], [address], [place] [address]  Main: [phone], Fax: [phone]      Outpatient Visit Note:    Patient: Hayden Ramires  MRN: [id number]  : 1985  ANABEL: 2023    History of Present Illness  Hayden Ramires is a 37 year old male that is well known to our clinic for his history of antithrombin deficiency and unprovoked PE in . [facility] with the [facility] was with Dr. Lagunas on 2015. Please refer to this consultation note and subsequent follow-up notes for additional details regarding this patient's personal/family history and initial presentation. He is maintained on long term anticoagulation with warfarin and is here today for routine follow-up.      Interval History:  Continues on warfarin with home INR monitoring. INRs have been largely in range over the past year. Denies bleeding--major or nusiance. No recurrent venous thromboembolism. No surgeries/procedures planned for the coming year. Due for colonoscopy next year.    ROS:  Denies epistaxis, gingival bleeding, excessive bruising/ecchymosis, hematuria, hematochezia, and melena. Denies LE pain/swelling. Denies chest pain. Denies shortness of breath.     Family History:  Strong family history of antithrombin deficiency and venous thromboembolism. This is detailed in previous clinic notes.     Exam via Televideo:  Exam:   Constitutional: Appears well. Not in distress.  Respiratory: Breathing comfortably on room air.  Neuro: Aox3.    Labs:  Component      Latest Ref Rng 2022  12:00 AM 2023  12:00 AM 2023  12:00 AM 2023  12:00 AM 2023  12:00 AM 2023  12:00 AM   INR HOME MONITORING      2.000 - 3.000  2.0  2.2  2.6  2.1  2.9  2.8      Component      Latest Ref Rng 2023  12:00 AM 10/3/2023  12:00 AM 10/24/2023  12:00 AM 10/27/2023  12:00 AM 11/3/2023  12:00 AM    INR HOME MONITORING      2.000 - 3.000  2.9  3.0  1.3 (L)  3.1 (H)  2.9      Component      Latest Ref Rng 11/22/2023  12:00 AM   INR HOME MONITORING      2.000 - 3.000  2.8         Assessment/Plan:  In summary, Hayden Ramires is a 37 year old male with a history of antithrombin deficiency and unprovoked PE in 2012 that is maintained on indefinite anticoagulation with warfarin and is here today for routine follow-up. Over the past year, Hayden has done well on warfarin. His INRs have been largely stable in goal range and he has had no bleeding complications or recurrent venous thromboembolism. He remains an appropriate candidate for chronic anticoagulation with warfarin. He would also be a candidate for a direct oral anticoagulant but he prefers to remain on warfarin at this time. He should follow-up in 1 year.     Video Visit  Patient Location: Home.  Provider Location: Home office in [place].    Joined the call at 11/28/2023, 9:01:21?am.  Left the call at 11/28/2023, 9:05:32?am.  You were on the call for 4 minutes 10 seconds .      10 minutes spent on the date of the encounter doing chart review, history and exam, documentation and further activities per the note.           Sofya May PA-C, [facility]  [facility]  [address] [place] [address], [address], [place] [address]  Main: [phone], Fax: [phone]

## 2023-12-27 ENCOUNTER — MYC MEDICAL ADVICE (OUTPATIENT)
Dept: ANTICOAGULATION | Facility: CLINIC | Age: 38
End: 2023-12-27
Payer: COMMERCIAL

## 2024-01-08 ENCOUNTER — ANTICOAGULATION THERAPY VISIT (OUTPATIENT)
Dept: ANTICOAGULATION | Facility: CLINIC | Age: 39
End: 2024-01-08
Payer: COMMERCIAL

## 2024-01-08 ENCOUNTER — TELEPHONE (OUTPATIENT)
Dept: ANTICOAGULATION | Facility: CLINIC | Age: 39
End: 2024-01-08
Payer: COMMERCIAL

## 2024-01-08 DIAGNOSIS — D68.59 ANTITHROMBIN DEFICIENCY (H): ICD-10-CM

## 2024-01-08 DIAGNOSIS — I26.99 PULMONARY EMBOLISM WITH INFARCTION (H): Primary | ICD-10-CM

## 2024-01-08 DIAGNOSIS — Z79.01 LONG TERM CURRENT USE OF ANTICOAGULANT THERAPY: ICD-10-CM

## 2024-01-08 DIAGNOSIS — Z86.711 HISTORY OF PULMONARY EMBOLISM: ICD-10-CM

## 2024-01-08 LAB — INR HOME MONITORING: 3 (ref 2–3)

## 2024-01-08 NOTE — TELEPHONE ENCOUNTER
ANTICOAGULATION     Hayden Ramires is overdue for an INR check.     Left message for patient to call and schedule lab appointment as soon as possible. If returning call, please schedule.     Perla Barton RN

## 2024-01-08 NOTE — PROGRESS NOTES
ANTICOAGULATION MANAGEMENT     Hayden HARVEY [place] 38 year old male is on warfarin with therapeutic INR result. (Goal INR 2.0-3.0)    Recent labs: (last 7 days)     01/08/24  0000   INR 3.0       ASSESSMENT     Source(s): Chart Review     Warfarin doses taken: Reviewed in chart  Diet: No new diet changes identified  Medication/supplement changes: None noted  New illness, injury, or hospitalization: No  Signs or symptoms of bleeding or clotting: No  Previous result: Therapeutic last 2(+) visits  Additional findings: None       PLAN     Recommended plan for no diet, medication or health factor changes affecting INR     Dosing Instructions: Continue your current warfarin dose with next INR in 4 weeks       Summary  As of 1/8/2024      Full warfarin instructions:  10 mg every Mon, Thu; 7.5 mg all other days   Next INR check:  2/5/2024               Detailed voice message left for Hayden with dosing instructions and follow up date.     Contact [phone] to schedule and with any changes, questions or concerns.     Education provided:   Please call back if any changes to your diet, medications or how you've been taking warfarin  Contact [phone] with any changes, questions or concerns.     Plan made per [facility]agulation protocol    Soledad Resendiz, RN  Anticoagulation Clinic  1/8/2024    _______________________________________________________________________     Anticoagulation Episode Summary       Current INR goal:  2.0-3.0   TTR:  96.3% (1 y)   Target end date:  Indefinite   [facility]ers [facility]    Indications    Pulmonary embolism with infarction (H) [I26.99] [I26.99]  Long-term (current) use of anticoagulants [Z79.01] [Z79.01]  Antithrombin deficiency (H24) [D68.59]  History of pulmonary embolism [Z86.711]             Comments:  Prefers INR 2.5-3.0 goal range-ok with Hematology to aim for preferred goal range.  Okay to go 4 weeks in between INR's per Semaj 6/1/18              Anticoagulation Care Providers       Provider Role Specialty Phone number    Sinan Lagunas MD Referring Hematology [phone]

## 2024-01-31 ENCOUNTER — DOCUMENTATION ONLY (OUTPATIENT)
Dept: ANTICOAGULATION | Facility: CLINIC | Age: 39
End: 2024-01-31
Payer: COMMERCIAL

## 2024-01-31 DIAGNOSIS — D68.59 ANTITHROMBIN DEFICIENCY (H): ICD-10-CM

## 2024-01-31 DIAGNOSIS — Z86.711 HISTORY OF PULMONARY EMBOLISM: Primary | ICD-10-CM

## 2024-01-31 DIAGNOSIS — Z79.01 LONG TERM CURRENT USE OF ANTICOAGULANT THERAPY: ICD-10-CM

## 2024-01-31 NOTE — PROGRESS NOTES
ANTICOAGULATION CLINIC REFERRAL RENEWAL REQUEST       An annual renewal order is required for all patients referred to [facility].?  Please review and sign the pended referral order for Hayden HARVEY [place].       ANTICOAGULATION SUMMARY      Warfarin indication(s)   PE and Antithrombin Deficiency    Mechanical heart valve present?  NO       Current goal range   INR: 2.0-3.0     Goal appropriate for indication? Goal INR 2-3, standard for indication(s) above     Time in Therapeutic Range (TTR)  (Goal > 60%) 96.3%       Office visit with referring provider's group within last year yes on 11/28/23       PATRICIA MENDEZ RN  [facility]

## 2024-02-13 ENCOUNTER — MYC MEDICAL ADVICE (OUTPATIENT)
Dept: ANTICOAGULATION | Facility: CLINIC | Age: 39
End: 2024-02-13
Payer: COMMERCIAL

## 2024-02-20 ENCOUNTER — ANTICOAGULATION THERAPY VISIT (OUTPATIENT)
Dept: ANTICOAGULATION | Facility: CLINIC | Age: 39
End: 2024-02-20
Payer: COMMERCIAL

## 2024-02-20 DIAGNOSIS — I26.99 PULMONARY EMBOLISM WITH INFARCTION (H): Primary | ICD-10-CM

## 2024-02-20 DIAGNOSIS — D68.59 ANTITHROMBIN DEFICIENCY (H): ICD-10-CM

## 2024-02-20 DIAGNOSIS — Z86.711 HISTORY OF PULMONARY EMBOLISM: ICD-10-CM

## 2024-02-20 DIAGNOSIS — Z79.01 LONG TERM CURRENT USE OF ANTICOAGULANT THERAPY: ICD-10-CM

## 2024-02-20 LAB — INR HOME MONITORING: 2.4 (ref 2–3)

## 2024-02-20 NOTE — PROGRESS NOTES
ANTICOAGULATION MANAGEMENT     Hayden HARVEY [place] 38 year old male is on warfarin with therapeutic INR result. (Goal INR 2.0-3.0)    Recent labs: (last 7 days)     02/20/24  0000   INR 2.4       ASSESSMENT     Source(s): Chart Review and Patient/Caregiver Call     Warfarin doses taken: Warfarin taken as instructed  Diet: No new diet changes identified  Medication/supplement changes: None noted  New illness, injury, or hospitalization: No  Signs or symptoms of bleeding or clotting: No  Previous result: Therapeutic last 2(+) visits  Additional findings: None       PLAN     Recommended plan for no diet, medication or health factor changes affecting INR     Dosing Instructions: Continue your current warfarin dose with next INR in 4 weeks       Summary  As of 2/20/2024      Full warfarin instructions:  10 mg every Mon, Thu; 7.5 mg all other days   Next INR check:  3/26/2024               Telephone call with Hayden who agrees to plan and repeated back plan correctly    Patient to recheck with home meter    Education provided:   Goal range and lab monitoring: goal range and significance of current result and Importance of following up at instructed interval  Contact [phone] with any changes, questions or concerns.     Plan made per [facility]agulation protocol    PATRICIA MENDEZ RN  Anticoagulation Clinic  2/20/2024    _______________________________________________________________________     Anticoagulation Episode Summary       Current INR goal:  2.0-3.0   TTR:  96.3% (1 y)   Target end date:  Indefinite   Send INR reminders to:  [facility]    Indications    Pulmonary embolism with infarction (H) [I26.99] [I26.99]  Long-term (current) use of anticoagulants [Z79.01] [Z79.01]  Antithrombin deficiency (H24) [D68.59]  History of pulmonary embolism [Z86.711]             Comments:  Prefers INR 2.5-3.0 goal range-ok with Hematology to aim for preferred goal range.  Okay to go 4 weeks in between INR's per Semaj 6/1/18              Anticoagulation Care Providers       Provider Role Specialty Phone number    Sinan Lagunas MD Referring Hematology [phone]

## 2024-04-01 ENCOUNTER — ANTICOAGULATION THERAPY VISIT (OUTPATIENT)
Dept: ANTICOAGULATION | Facility: CLINIC | Age: 39
End: 2024-04-01
Payer: COMMERCIAL

## 2024-04-01 DIAGNOSIS — I26.99 PULMONARY EMBOLISM WITH INFARCTION (H): Primary | ICD-10-CM

## 2024-04-01 DIAGNOSIS — Z86.711 HISTORY OF PULMONARY EMBOLISM: ICD-10-CM

## 2024-04-01 DIAGNOSIS — D68.59 ANTITHROMBIN DEFICIENCY (H): ICD-10-CM

## 2024-04-01 DIAGNOSIS — Z79.01 LONG TERM CURRENT USE OF ANTICOAGULANT THERAPY: ICD-10-CM

## 2024-04-01 LAB — INR HOME MONITORING: 2.8 (ref 2–3)

## 2024-04-01 NOTE — PROGRESS NOTES
ANTICOAGULATION MANAGEMENT     Hayden HARVEY [place] 38 year old male is on warfarin with therapeutic INR result. (Goal INR 2.0-3.0)    Recent labs: (last 7 days)     04/01/24  0000   INR 2.8       ASSESSMENT     Source(s): Chart Review and Patient/Caregiver Call     Warfarin doses taken: Warfarin taken as instructed  Diet: No new diet changes identified  Medication/supplement changes: None noted  New illness, injury, or hospitalization: No  Signs or symptoms of bleeding or clotting: No  Previous result: Therapeutic last 2(+) visits  Additional findings: None       PLAN     Recommended plan for no diet, medication or health factor changes affecting INR     Dosing Instructions: Continue your current warfarin dose with next INR in 4 weeks       Summary  As of 4/1/2024      Full warfarin instructions:  10 mg every Mon, Thu; 7.5 mg all other days   Next INR check:  4/29/2024               Telephone call with Hayden who verbalizes understanding and agrees to plan    Patient to recheck with home meter    Education provided:   Please call back if any changes to your diet, medications or how you've been taking warfarin  Goal range and lab monitoring: Importance of therapeutic range and Importance of following up at instructed interval    Plan made per [facility] anticoagulation protocol    Carmen oGdinez RN  Anticoagulation Clinic  4/1/2024    _______________________________________________________________________     Anticoagulation Episode Summary       Current INR goal:  2.0-3.0   TTR:  96.3% (1 y)   Target end date:  Indefinite   Send INR reminders to:  [facility]    Indications    Pulmonary embolism with infarction (H) [I26.99] [I26.99]  Long-term (current) use of anticoagulants [Z79.01] [Z79.01]  Antithrombin deficiency (H24) [D68.59]  History of pulmonary embolism [Z86.711]             Comments:  Prefers INR 2.5-3.0 goal range-ok with Hematology to aim for preferred goal range.  Okay to go 4 weeks in between INR's per  Semaj 6/1/18             Anticoagulation Care Providers       Provider Role Specialty Phone number    Sinan Lagunas MD Referring Hematology [phone]

## 2024-05-08 ENCOUNTER — MYC MEDICAL ADVICE (OUTPATIENT)
Dept: ANTICOAGULATION | Facility: CLINIC | Age: 39
End: 2024-05-08
Payer: COMMERCIAL

## 2024-05-13 ENCOUNTER — ANTICOAGULATION THERAPY VISIT (OUTPATIENT)
Dept: ANTICOAGULATION | Facility: CLINIC | Age: 39
End: 2024-05-13
Payer: COMMERCIAL

## 2024-05-13 DIAGNOSIS — Z79.01 LONG TERM CURRENT USE OF ANTICOAGULANT THERAPY: ICD-10-CM

## 2024-05-13 DIAGNOSIS — Z86.711 HISTORY OF PULMONARY EMBOLISM: ICD-10-CM

## 2024-05-13 DIAGNOSIS — I26.99 PULMONARY EMBOLISM WITH INFARCTION (H): Primary | ICD-10-CM

## 2024-05-13 DIAGNOSIS — D68.59 ANTITHROMBIN DEFICIENCY (H): ICD-10-CM

## 2024-05-13 LAB — INR HOME MONITORING: 3.5 (ref 2–3)

## 2024-05-13 NOTE — PROGRESS NOTES
ANTICOAGULATION MANAGEMENT     Hayden HARVEY [place] 38 year old male is on warfarin with supratherapeutic INR result. (Goal INR 2.0-3.0)    Recent labs: (last 7 days)     05/13/24  0000   INR 3.5*       ASSESSMENT     Source(s): Chart Review and Patient/Caregiver Call     Warfarin doses taken: Warfarin taken as instructed  Diet: No new diet changes identified  Medication/supplement changes:  has been taking 400 mg of ibuprofen daily for plantar fasciitis   New illness, injury, or hospitalization: Yes: plantar fasciitis x 3-4 months  Signs or symptoms of bleeding or clotting: No  Previous result: Therapeutic last 2(+) visits  Additional findings: None       PLAN     Recommended plan for ongoing change(s) affecting INR (plantar fasciitis)    Dosing Instructions: decrease your warfarin dose (8.5% change) with next INR in 1- 2 weeks       Summary  As of 5/13/2024      Full warfarin instructions:  7.5 mg every day   Next INR check:  5/24/2024               Telephone call with Hayden who agrees to plan and repeated back plan correctly    Patient to recheck with home meter    Education provided:   Symptom monitoring: monitoring for bleeding signs and symptoms    Plan made per [facility] anticoagulation protocol    Salud Bosch RN  Anticoagulation Clinic  5/13/2024    _______________________________________________________________________     Anticoagulation Episode Summary       Current INR goal:  2.0-3.0   TTR:  88.1% (1 y)   Target end date:  Indefinite   Send INR reminders to:  [facility]    Indications    Pulmonary embolism with infarction (H) [I26.99] [I26.99]  Long-term (current) use of anticoagulants [Z79.01] [Z79.01]  Antithrombin deficiency (H24) [D68.59]  History of pulmonary embolism [Z86.711]             Comments:  Prefers INR 2.5-3.0 goal range-ok with Hematology to aim for preferred goal range.  Okay to go 4 weeks in between INR's per Semaj 6/1/18             Anticoagulation Care Providers       Provider  Role Specialty Phone number    Sinan Lagunas MD Referring Hematology [phone]

## 2024-06-03 ENCOUNTER — MYC MEDICAL ADVICE (OUTPATIENT)
Dept: ANTICOAGULATION | Facility: CLINIC | Age: 39
End: 2024-06-03
Payer: COMMERCIAL

## 2024-06-10 ENCOUNTER — ANTICOAGULATION THERAPY VISIT (OUTPATIENT)
Dept: ANTICOAGULATION | Facility: CLINIC | Age: 39
End: 2024-06-10
Payer: COMMERCIAL

## 2024-06-10 DIAGNOSIS — D68.59 ANTITHROMBIN DEFICIENCY (H): ICD-10-CM

## 2024-06-10 DIAGNOSIS — Z79.01 LONG TERM CURRENT USE OF ANTICOAGULANT THERAPY: ICD-10-CM

## 2024-06-10 DIAGNOSIS — Z86.711 HISTORY OF PULMONARY EMBOLISM: ICD-10-CM

## 2024-06-10 DIAGNOSIS — I26.99 PULMONARY EMBOLISM WITH INFARCTION (H): Primary | ICD-10-CM

## 2024-06-10 LAB — INR HOME MONITORING: 2.4 (ref 2–3)

## 2024-06-10 NOTE — PROGRESS NOTES
ANTICOAGULATION MANAGEMENT     Hayden HARVEY [place] 38 year old male is on warfarin with therapeutic INR result. (Goal INR 2.0-3.0)    Recent labs: (last 7 days)     06/10/24  0000   INR 2.4       ASSESSMENT     Source(s): Chart Review  Previous INR was Therapeutic last 2(+) visits  Medication, diet, health changes since last INR chart reviewed; none identified    *new number given to patient*     PLAN     Recommended plan for no diet, medication or health factor changes affecting INR     Dosing Instructions: Continue your current warfarin dose with next INR in 4 weeks       Summary  As of 6/10/2024      Full warfarin instructions:  7.5 mg every day   Next INR check:  7/8/2024               Detailed voice message left for Hayden with dosing instructions and follow up date.     Patient to recheck with home meter    Education provided:   Please call back if any changes to your diet, medications or how you've been taking warfarin    Plan made per [facility] anticoagulation protocol    Carmen Godinez RN  Anticoagulation Clinic  6/10/2024    _______________________________________________________________________     Anticoagulation Episode Summary       Current INR goal:  2.0-3.0   TTR:  84.6% (1 y)   Target end date:  Indefinite   Send INR reminders to:  [facility]    Indications    Pulmonary embolism with infarction (H) [I26.99] [I26.99]  Long-term (current) use of anticoagulants [Z79.01] [Z79.01]  Antithrombin deficiency (H24) [D68.59]  History of pulmonary embolism [Z86.711]             Comments:  Prefers INR 2.5-3.0 goal range-ok with Hematology to aim for preferred goal range.  Okay to go 4 weeks in between INR's per Semaj 6/1/18             Anticoagulation Care Providers       Provider Role Specialty Phone number    Sinan Lagunas MD Referring Hematology [phone]

## 2024-07-16 ENCOUNTER — ANTICOAGULATION THERAPY VISIT (OUTPATIENT)
Dept: ANTICOAGULATION | Facility: CLINIC | Age: 39
End: 2024-07-16
Payer: COMMERCIAL

## 2024-07-16 DIAGNOSIS — Z86.711 HISTORY OF PULMONARY EMBOLISM: ICD-10-CM

## 2024-07-16 DIAGNOSIS — D68.59 ANTITHROMBIN DEFICIENCY (H): ICD-10-CM

## 2024-07-16 DIAGNOSIS — Z79.01 LONG TERM CURRENT USE OF ANTICOAGULANT THERAPY: ICD-10-CM

## 2024-07-16 DIAGNOSIS — I26.99 PULMONARY EMBOLISM WITH INFARCTION (H): Primary | ICD-10-CM

## 2024-07-16 LAB — INR HOME MONITORING: 2 (ref 2–3)

## 2024-07-16 NOTE — PROGRESS NOTES
ANTICOAGULATION MANAGEMENT     Hayden HARVEY [place] 38 year old male is on warfarin with therapeutic INR result. (Goal INR 2.0-3.0)    Recent labs: (last 7 days)     07/16/24  0000   INR 2.0       ASSESSMENT     Source(s): Chart Review and Patient/Caregiver Call     Warfarin doses taken: Warfarin taken as instructed  Diet: No new diet changes identified  Medication/supplement changes: None noted  New illness, injury, or hospitalization: No  Signs or symptoms of bleeding or clotting: No  Previous result: Therapeutic last visit; previously outside of goal range  Additional findings: None       PLAN     Recommended plan for no diet, medication or health factor changes affecting INR     Dosing Instructions: Continue your current warfarin dose with next INR in 4 weeks       Summary  As of 7/16/2024      Full warfarin instructions:  7.5 mg every day   Next INR check:  8/13/2024               Telephone call with Hayden who verbalizes understanding and agrees to plan    Patient to recheck with home meter    Education provided: Goal range and lab monitoring: goal range and significance of current result and Importance of therapeutic range    Plan made per [facility] anticoagulation protocol    Jonas Sexton RN  Anticoagulation Clinic  7/16/2024    _______________________________________________________________________     Anticoagulation Episode Summary       Current INR goal:  2.0-3.0   TTR:  84.6% (1 y)   Target end date:  Indefinite   Send INR reminders to:  [facility]    Indications    Pulmonary embolism with infarction (H) [I26.99] [I26.99]  Long-term (current) use of anticoagulants [Z79.01] [Z79.01]  Antithrombin deficiency (H24) [D68.59]  History of pulmonary embolism [Z86.711]             Comments:  Prefers INR 2.5-3.0 goal range-ok with Hematology to aim for preferred goal range.  Okay to go 4 weeks in between INR's per Semaj 6/1/18             Anticoagulation Care Providers       Provider Role Specialty Phone number     Sinan Lagunas MD Referring Hematology [phone]

## 2024-08-03 ENCOUNTER — HEALTH MAINTENANCE LETTER (OUTPATIENT)
Age: 39
End: 2024-08-03

## 2024-08-20 ENCOUNTER — MYC MEDICAL ADVICE (OUTPATIENT)
Dept: ANTICOAGULATION | Facility: CLINIC | Age: 39
End: 2024-08-20
Payer: COMMERCIAL

## 2024-08-26 ENCOUNTER — ANTICOAGULATION THERAPY VISIT (OUTPATIENT)
Dept: ANTICOAGULATION | Facility: CLINIC | Age: 39
End: 2024-08-26
Payer: COMMERCIAL

## 2024-08-26 DIAGNOSIS — Z86.711 HISTORY OF PULMONARY EMBOLISM: ICD-10-CM

## 2024-08-26 DIAGNOSIS — I26.99 PULMONARY EMBOLISM WITH INFARCTION (H): Primary | ICD-10-CM

## 2024-08-26 DIAGNOSIS — D68.59 ANTITHROMBIN DEFICIENCY (H): ICD-10-CM

## 2024-08-26 DIAGNOSIS — Z79.01 LONG TERM CURRENT USE OF ANTICOAGULANT THERAPY: ICD-10-CM

## 2024-08-26 LAB — INR HOME MONITORING: 3 (ref 2–3)

## 2024-08-26 NOTE — PROGRESS NOTES
ANTICOAGULATION MANAGEMENT     Hayden HARVEY [place] 38 year old male is on warfarin with therapeutic INR result. (Goal INR 2.0-3.0)    Recent labs: (last 7 days)     08/26/24  0000   INR 3.0       ASSESSMENT     Source(s): Chart Review and Patient/Caregiver Call     Warfarin doses taken: Warfarin taken as instructed  Diet: No new diet changes identified  Medication/supplement changes: None noted  New illness, injury, or hospitalization: No  Signs or symptoms of bleeding or clotting: No  Previous result: Therapeutic last 2(+) visits  Additional findings: None       PLAN     Recommended plan for no diet, medication or health factor changes affecting INR     Dosing Instructions: Continue your current warfarin dose with next INR in 4 weeks       Summary  As of 8/26/2024      Full warfarin instructions:  7.5 mg every day   Next INR check:  9/23/2024               Telephone call with Hayden who verbalizes understanding and agrees to plan    Patient to recheck with home meter    Education provided: Goal range and lab monitoring: goal range and significance of current result and Importance of therapeutic range    Plan made per [facility] anticoagulation protocol    Jonas Sexton RN  Anticoagulation Clinic  8/26/2024    _______________________________________________________________________     Anticoagulation Episode Summary       Current INR goal:  2.0-3.0   TTR:  84.6% (1 y)   Target end date:  Indefinite   Send INR reminders to:  [facility]    Indications    Pulmonary embolism with infarction (H) [I26.99] [I26.99]  Long-term (current) use of anticoagulants [Z79.01] [Z79.01]  Antithrombin deficiency (H24) [D68.59]  History of pulmonary embolism [Z86.711]             Comments:  Prefers INR 2.5-3.0 goal range-ok with Hematology to aim for preferred goal range.  Okay to go 4 weeks in between INR's per Semaj 6/1/18             Anticoagulation Care Providers       Provider Role Specialty Phone number    Sinan Lagunas MD  Referring Hematology [phone]

## 2024-09-30 ENCOUNTER — MYC MEDICAL ADVICE (OUTPATIENT)
Dept: ANTICOAGULATION | Facility: CLINIC | Age: 39
End: 2024-09-30
Payer: COMMERCIAL

## 2024-10-07 ENCOUNTER — ANTICOAGULATION THERAPY VISIT (OUTPATIENT)
Dept: ANTICOAGULATION | Facility: CLINIC | Age: 39
End: 2024-10-07
Payer: COMMERCIAL

## 2024-10-07 DIAGNOSIS — Z79.01 LONG TERM CURRENT USE OF ANTICOAGULANT THERAPY: ICD-10-CM

## 2024-10-07 DIAGNOSIS — Z86.711 HISTORY OF PULMONARY EMBOLISM: ICD-10-CM

## 2024-10-07 DIAGNOSIS — D68.59 ANTITHROMBIN DEFICIENCY (H): ICD-10-CM

## 2024-10-07 DIAGNOSIS — I26.99 PULMONARY EMBOLISM WITH INFARCTION (H): Primary | ICD-10-CM

## 2024-10-07 LAB — INR HOME MONITORING: 2.1 (ref 2–3)

## 2024-10-07 NOTE — PROGRESS NOTES
ANTICOAGULATION MANAGEMENT     Hayden Ramires 39 year old male is on warfarin with therapeutic INR result. (Goal INR 2.0-3.0)    Recent labs: (last 7 days)     10/07/24  0000   INR 2.1       ASSESSMENT     Source(s): Chart Review and Patient/Caregiver Call     Warfarin doses taken: Warfarin taken as instructed  Diet: No new diet changes identified  Medication/supplement changes: None noted  New illness, injury, or hospitalization: No  Signs or symptoms of bleeding or clotting: No  Previous result: Therapeutic last 2(+) visits  Additional findings: None       PLAN     Recommended plan for no diet, medication or health factor changes affecting INR     Dosing Instructions: Continue your current warfarin dose with next INR in 4 weeks       Summary  As of 10/7/2024      Full warfarin instructions:  7.5 mg every day   Next INR check:  11/4/2024               Telephone call with Hayden who verbalizes understanding and agrees to plan and who agrees to plan and repeated back plan correctly    Patient to recheck with home meter    Education provided: Contact [phone] with any changes, questions or concerns.     Plan made per [facility] anticoagulation protocol    Salud Bosch RN  10/7/2024  Anticoagulation Clinic  [employer] for routing messages: p  ANTICOAG [facility] patient phone line: [phone]        _______________________________________________________________________     Anticoagulation Episode Summary       Current INR goal:  2.0-3.0   TTR:  84.6% (1 y)   Target end date:  Indefinite   Send INR reminders to:  [facility]    Indications    Pulmonary embolism with infarction (H) [I26.99] [I26.99]  Long-term (current) use of anticoagulants [Z79.01] [Z79.01]  Antithrombin deficiency (H) [D68.59]  History of pulmonary embolism [Z86.711]             Comments:  Prefers INR 2.5-3.0 goal range-ok with Hematology to aim for preferred goal range.  Okay to go 4 weeks in between INR's per Semaj 6/1/18              Anticoagulation Care Providers       Provider Role Specialty Phone number    Sinan Lagunas MD Referring Hematology [phone]

## 2024-11-11 ENCOUNTER — MYC MEDICAL ADVICE (OUTPATIENT)
Dept: ANTICOAGULATION | Facility: CLINIC | Age: 39
End: 2024-11-11
Payer: COMMERCIAL

## 2024-11-14 ENCOUNTER — ANTICOAGULATION THERAPY VISIT (OUTPATIENT)
Dept: ANTICOAGULATION | Facility: CLINIC | Age: 39
End: 2024-11-14
Payer: COMMERCIAL

## 2024-11-14 DIAGNOSIS — D68.59 ANTITHROMBIN DEFICIENCY (H): ICD-10-CM

## 2024-11-14 DIAGNOSIS — Z86.711 HISTORY OF PULMONARY EMBOLISM: ICD-10-CM

## 2024-11-14 DIAGNOSIS — I26.99 PULMONARY EMBOLISM WITH INFARCTION (H): Primary | ICD-10-CM

## 2024-11-14 DIAGNOSIS — Z79.01 LONG TERM CURRENT USE OF ANTICOAGULANT THERAPY: ICD-10-CM

## 2024-11-14 LAB — INR HOME MONITORING: 2.2 (ref 2–3)

## 2024-11-14 RX ORDER — WARFARIN SODIUM 7.5 MG/1
TABLET ORAL
Qty: 90 TABLET | Refills: 1 | Status: SHIPPED | OUTPATIENT
Start: 2024-11-14

## 2024-11-14 NOTE — PROGRESS NOTES
ANTICOAGULATION MANAGEMENT     Hayden HARVEY [place] 39 year old male is on warfarin with therapeutic INR result. (Goal INR 2.0-3.0)    Recent labs: (last 7 days)     11/14/24  0000   INR 2.2       ASSESSMENT     Source(s): Chart Review and Patient/Caregiver Call     Warfarin doses taken: Warfarin taken as instructed  Diet: No new diet changes identified  Medication/supplement changes: None noted  New illness, injury, or hospitalization: No  Signs or symptoms of bleeding or clotting: No  Previous result: Therapeutic last 2(+) visits  Additional findings: Refill needed today. Hayden meets all criteria for refill (current [facility] referral, visit with referring provider/group in last 15 months unless directed to return in 2 years in last referring provider visit note, lab monitoring up to date or not exceeding 2 weeks overdue). Rx instructions and quantity supplied updated to match patient's current dosing plan. Warfarin 90 day supply with 1 refill granted per [facility] protocol        PLAN     Recommended plan for no diet, medication or health factor changes affecting INR     Dosing Instructions: Continue your current warfarin dose with next INR in 4 weeks       Summary  As of 11/14/2024      Full warfarin instructions:  7.5 mg every day   Next INR check:  12/12/2024               Telephone call with Hayden who verbalizes understanding and agrees to plan  Sent AMX message with dosing and follow up instructions    Patient to recheck with home meter    Education provided: None required  Please call back if any changes to your diet, medications or how you've been taking warfarin    Plan made per [facility] anticoagulation protocol    Thierry Martinez, RN  11/14/2024  Anticoagulation Clinic  [id number] for routing messages: larisa [facility]  [facility] patient phone line: [phone]        _______________________________________________________________________     Anticoagulation Episode Summary       Current INR goal:  2.0-3.0   TTR:  88.3% (1  y)   Target end date:  Indefinite   Send INR reminders to:  [facility]    Indications    Pulmonary embolism with infarction (H) [I26.99] [I26.99]  Long-term (current) use of anticoagulants [Z79.01] [Z79.01]  Antithrombin deficiency (H) [D68.59]  History of pulmonary embolism [Z86.711]             Comments:  Prefers INR 2.5-3.0 goal range-ok with Hematology to aim for preferred goal range.  Okay to go 4 weeks in between INR's per Semaj 6/1/18             Anticoagulation Care Providers       Provider Role Specialty Phone number    Sinan Lagunas MD Referring Hematology [phone]

## 2024-12-23 ENCOUNTER — MYC MEDICAL ADVICE (OUTPATIENT)
Dept: ANTICOAGULATION | Facility: CLINIC | Age: 39
End: 2024-12-23
Payer: COMMERCIAL

## 2024-12-24 ENCOUNTER — DOCUMENTATION ONLY (OUTPATIENT)
Dept: ANTICOAGULATION | Facility: CLINIC | Age: 39
End: 2024-12-24
Payer: COMMERCIAL

## 2024-12-24 ENCOUNTER — ANTICOAGULATION THERAPY VISIT (OUTPATIENT)
Dept: ANTICOAGULATION | Facility: CLINIC | Age: 39
End: 2024-12-24
Payer: COMMERCIAL

## 2024-12-24 DIAGNOSIS — Z79.01 LONG TERM CURRENT USE OF ANTICOAGULANT THERAPY: ICD-10-CM

## 2024-12-24 DIAGNOSIS — Z86.711 HISTORY OF PULMONARY EMBOLISM: ICD-10-CM

## 2024-12-24 DIAGNOSIS — D68.59 ANTITHROMBIN DEFICIENCY (H): ICD-10-CM

## 2024-12-24 DIAGNOSIS — Z79.01 CHRONIC ANTICOAGULATION: ICD-10-CM

## 2024-12-24 DIAGNOSIS — I26.99 PULMONARY EMBOLISM WITH INFARCTION (H): Primary | ICD-10-CM

## 2024-12-24 LAB — INR HOME MONITORING: 2 (ref 2–3)

## 2024-12-24 NOTE — PROGRESS NOTES
ANTICOAGULATION MANAGEMENT     Hayden HARVEY [place] 39 year old male is on warfarin with therapeutic INR result. (Goal INR 2.0-3.0)    Recent labs: (last 7 days)     12/24/24  0000   INR 2.0       ASSESSMENT     Source(s): Chart Review and Patient/Caregiver Call     Warfarin doses taken: Warfarin taken as instructed  Diet: No new diet changes identified  Medication/supplement changes: None noted  New illness, injury, or hospitalization: No  Signs or symptoms of bleeding or clotting: No  Previous result: Therapeutic last 2(+) visits  Additional findings: pt prefers to have INR around 3.0 vs 2.0 - will increase slightly at this time.        PLAN     Recommended plan for no diet, medication or health factor changes affecting INR     Dosing Instructions: Increase your warfarin dose (4.8% change) with next INR in 4 weeks       Summary  As of 12/24/2024      Full warfarin instructions:  10 mg every Thu; 7.5 mg all other days   Next INR check:  1/21/2025               Telephone call with Hayden who verbalizes understanding and agrees to plan    Patient to recheck with home meter    Education provided: Please call back if any changes to your diet, medications or how you've been taking warfarin    Plan made per [facility] anticoagulation protocol    Deloris Lundberg RN  12/24/2024  Anticoagulation Clinic  [employer] for routing messages: larisa  ANTICOAG CLINIC  [facility] patient phone line: [phone]        _______________________________________________________________________     Anticoagulation Episode Summary       Current INR goal:  2.0-3.0   TTR:  88.3% (1 y)   Target end date:  Indefinite   Send INR reminders to:  [facility]    Indications    Pulmonary embolism with infarction (H) [I26.99] [I26.99]  Long-term (current) use of anticoagulants [Z79.01] [Z79.01]  Antithrombin deficiency (H) [D68.59]  History of pulmonary embolism [Z86.711]             Comments:  Prefers INR 2.5-3.0 goal range-ok with Hematology to aim for  preferred goal range.  Okay to go 4 weeks in between INR's per Semaj 6/1/18             Anticoagulation Care Providers       Provider Role Specialty Phone number    Sinan Lagunas MD Referring Hematology [phone]

## 2024-12-24 NOTE — PROGRESS NOTES
ANTICOAGULATION CLINIC REFERRAL RENEWAL REQUEST       An annual renewal order is required for all patients referred to [facility].?  Please review and sign the pended referral order for Hayden Ramires.       ANTICOAGULATION SUMMARY      Warfarin indication(s)      PE and Antithrombin Deficiency     Mechanical heart valve present?  NO       Current goal range   INR: 2.0-3.0     Goal appropriate for indication? Goal INR 2-3, standard for indication(s) above     Time in Therapeutic Range (TTR)  (Goal > 60%) 88.3%       Office visit with referring provider's group within last year no on 11/28/23 -- but has an upcoming appt on 1/7/25        Deloris Lundberg RN  [facility]

## 2024-12-26 PROBLEM — Z79.01 CHRONIC ANTICOAGULATION: Status: ACTIVE | Noted: 2024-12-26

## 2025-01-06 NOTE — PROGRESS NOTES
[facility]  [facility]  [facility] [address], [address], [place] [address]  Main: [phone], Fax: [phone]      Outpatient Visit Note:    Patient: Hayden Ramires  MRN: [id number]  : 1985  ANABEL: 2025    History of Present Illness  Hayden Ramires is a 39 year old male that is well known to our clinic for his history of antithrombin deficiency and unprovoked PE in . His initial [facility] with the [facility] was with Dr. Lagunas on 2015. Please refer to this consultation note and subsequent follow-up notes for additional details regarding this patient's personal/family history and initial presentation. He is maintained on long term anticoagulation with warfarin and is here today for routine follow-up.      Interval History:  Continues on warfarin with home INR monitoring. INRs have been largely in range over the past year. Denies bleeding--major or nusiance. No recurrent venous thromboembolism. No surgeries/procedures planned for the coming year. Remains uninterested in direct oral anticoagulants at this time.    ROS:  Denies epistaxis, oral/mucosal bleeding, excessive bruising/ecchymosis, hematuria, hematochezia, and melena.    Family History:  Strong family history of antithrombin deficiency and venous thromboembolism. This is detailed in previous clinic notes.     Objectives:  There were no vitals taken for this visit.  Exam:   Constitutional: Appears well, no distress  Respiratory: Normal work of breathing.  Skin: no petechiae, no ecchymosis.  Neuro: AOx3    Labs:              Component  Ref Range & Units 13 d ago  (24) 1 mo ago  (24) 3 mo ago  (10/7/24) 4 mo ago  (24) 5 mo ago  (24) 7 mo ago  (6/10/24) 7 mo ago  (24)    INR HOME MONITORING  2.000 - 3.000 2.0 2.2 2.1 3.0 2.0 2.4 3.5 High                       Component  Ref Range & Units 9 mo ago  (24) 10 mo  ago  (2/20/24) 12 mo ago  (1/8/24) 1 yr ago  (11/22/23) 1 yr ago  (11/3/23) 1 yr ago  (10/27/23)     INR HOME MONITORING  2.000 - 3.000 2.8 2.4 3.0 2.8 2.9 3.1 High            Assessment/Plan:  In summary, Hayden Ramires is a 39 year old male with a history of antithrombin deficiency and unprovoked PE in 2012 that is maintained on indefinite anticoagulation with warfarin and is here today for routine follow-up. Over the past year, Hayden has done well on warfarin. His INRs have been largely stable in goal range and he has had no bleeding complications or recurrent venous thromboembolism. He remains an appropriate candidate for chronic anticoagulation with warfarin. He would also be a candidate for a direct oral anticoagulant but he prefers to remain on warfarin at this time. He should follow-up in 1 year.     Video Visit  Patient Location: Home.  Provider Location: Home office in [place].  Length of call: 4 minutes.    10 minutes spent on the date of the encounter doing chart review, history and exam, documentation and further activities per the note.           Sofya May PA-C, [facility]  [facility]  [address], [address], [place] [address]  Main: [phone], Fax: [phone]

## 2025-01-07 ENCOUNTER — VIRTUAL VISIT (OUTPATIENT)
Dept: HEMATOLOGY | Facility: CLINIC | Age: 40
End: 2025-01-07
Attending: PHYSICIAN ASSISTANT
Payer: COMMERCIAL

## 2025-01-07 VITALS — WEIGHT: 200 LBS | BODY MASS INDEX: 26.03 KG/M2

## 2025-01-07 DIAGNOSIS — Z79.01 LONG TERM CURRENT USE OF ANTICOAGULANT THERAPY: ICD-10-CM

## 2025-01-07 DIAGNOSIS — D68.59 ANTITHROMBIN DEFICIENCY: ICD-10-CM

## 2025-01-07 DIAGNOSIS — Z86.711 HISTORY OF PULMONARY EMBOLISM: ICD-10-CM

## 2025-01-07 RX ORDER — WARFARIN SODIUM 7.5 MG/1
TABLET ORAL
Qty: 90 TABLET | Refills: 4 | Status: SHIPPED | OUTPATIENT
Start: 2025-01-07

## 2025-01-07 NOTE — PROGRESS NOTES
Patient was contacted to complete the pre-visit call prior to their telephone visit with the provider.     Allergies and medications were reviewed.     I thanked them for their time to cover this information.     Aspen Diaz MA

## 2025-01-29 ENCOUNTER — MYC MEDICAL ADVICE (OUTPATIENT)
Dept: ANTICOAGULATION | Facility: CLINIC | Age: 40
End: 2025-01-29
Payer: COMMERCIAL

## 2025-02-05 ENCOUNTER — MYC MEDICAL ADVICE (OUTPATIENT)
Dept: ANTICOAGULATION | Facility: CLINIC | Age: 40
End: 2025-02-05
Payer: COMMERCIAL

## 2025-02-10 LAB — INR HOME MONITORING: 2.3 (ref 2–3)

## 2025-02-11 ENCOUNTER — ANTICOAGULATION THERAPY VISIT (OUTPATIENT)
Dept: ANTICOAGULATION | Facility: CLINIC | Age: 40
End: 2025-02-11
Payer: COMMERCIAL

## 2025-02-11 DIAGNOSIS — I26.99 PULMONARY EMBOLISM WITH INFARCTION (H): Primary | ICD-10-CM

## 2025-02-11 DIAGNOSIS — Z79.01 LONG TERM CURRENT USE OF ANTICOAGULANT THERAPY: ICD-10-CM

## 2025-02-11 DIAGNOSIS — D68.59 ANTITHROMBIN DEFICIENCY: ICD-10-CM

## 2025-02-11 DIAGNOSIS — Z86.711 HISTORY OF PULMONARY EMBOLISM: ICD-10-CM

## 2025-02-11 DIAGNOSIS — Z79.01 CHRONIC ANTICOAGULATION: ICD-10-CM

## 2025-02-11 NOTE — PROGRESS NOTES
ANTICOAGULATION MANAGEMENT     Hayden HARVEY [place] 39 year old male is on warfarin with therapeutic INR result. (Goal INR 2.0-3.0)    Recent labs: (last 7 days)     02/10/25  0000   INR 2.3       ASSESSMENT     Source(s): Chart Review     Warfarin doses taken: Reviewed in chart  Diet: No new diet changes identified  Medication/supplement changes: None noted  New illness, injury, or hospitalization: No  Signs or symptoms of bleeding or clotting: No  Previous result: Therapeutic last 2(+) visits  Additional findings: None       PLAN     Recommended plan for no diet, medication or health factor changes affecting INR     Dosing Instructions: Continue your current warfarin dose with next INR in 4 weeks       Summary  As of 2/11/2025      Full warfarin instructions:  10 mg every Thu; 7.5 mg all other days   Next INR check:  3/11/2025               Detailed voice message left for Hayden with dosing instructions and follow up date.     Contact [phone] to schedule and with any changes, questions or concerns.     Education provided: Please call back if any changes to your diet, medications or how you've been taking warfarin  Contact [phone] with any changes, questions or concerns.     Plan made per [facility] anticoagulation protocol    Soledad Resendiz RN  2/11/2025  Anticoagulation Clinic  [employer] for routing messages: larisa [facility] patient phone line: [phone]        _______________________________________________________________________     Anticoagulation Episode Summary       Current INR goal:  2.0-3.0   TTR:  88.3% (1 y)   Target end date:  Indefinite   Send INR reminders to:  [facility]    Indications    Pulmonary embolism with infarction (H) [I26.99] [I26.99]  Long-term (current) use of anticoagulants [Z79.01] [Z79.01]  Antithrombin deficiency [D68.59]  History of pulmonary embolism [Z86.711]  Chronic anticoagulation [Z79.01]             Comments:  Prefers INR 2.5-3.0 goal range-ok with  Hematology to aim for preferred goal range.  Okay to go 4 weeks in between INR's per Semaj 6/1/18             Anticoagulation Care Providers       Provider Role Specialty Phone number    Sinan Lagunas MD Referring Hematology [phone]

## 2025-04-20 LAB — INR HOME MONITORING: 2.1 (ref 2–3)

## 2025-04-21 ENCOUNTER — ANTICOAGULATION THERAPY VISIT (OUTPATIENT)
Dept: ANTICOAGULATION | Facility: CLINIC | Age: 40
End: 2025-04-21
Payer: COMMERCIAL

## 2025-04-21 DIAGNOSIS — Z79.01 CHRONIC ANTICOAGULATION: ICD-10-CM

## 2025-04-21 DIAGNOSIS — Z86.711 HISTORY OF PULMONARY EMBOLISM: ICD-10-CM

## 2025-04-21 DIAGNOSIS — D68.59 ANTITHROMBIN DEFICIENCY: ICD-10-CM

## 2025-04-21 DIAGNOSIS — Z79.01 LONG TERM CURRENT USE OF ANTICOAGULANT THERAPY: ICD-10-CM

## 2025-04-21 DIAGNOSIS — I26.99 PULMONARY EMBOLISM WITH INFARCTION (H): Primary | ICD-10-CM

## 2025-04-21 NOTE — PROGRESS NOTES
ANTICOAGULATION MANAGEMENT     Hayden HARVEY [place] 39 year old male is on warfarin with therapeutic INR result. (Goal INR 2.0-3.0)    Recent labs: (last 7 days)     04/20/25  0000   INR 2.1       ASSESSMENT     Source(s): Chart Review and Patient/Caregiver Call     Warfarin doses taken: Warfarin taken as instructed  Diet: No new diet changes identified  Medication/supplement changes: None noted  New illness, injury, or hospitalization: No  Signs or symptoms of bleeding or clotting: No  Previous result: Therapeutic last 2(+) visits  Additional findings:  Hayden prefers for his INR to be higher in goal range. Last several INRs have been low therapeutic. This is a reasonable request, ACN recommended a sooner recheck interval than his usual 4 weeks if he would like to go ahead with the dose increase.        PLAN     Recommended plan for no diet, medication or health factor changes affecting INR     Dosing Instructions: Increase your warfarin dose (4.5% change) with next INR in 2 weeks       Summary  As of 4/21/2025      Full warfarin instructions:  10 mg every Mon, Thu; 7.5 mg all other days   Next INR check:  5/6/2025               Telephone call with Hayden who verbalizes understanding and agrees to plan    Patient to recheck with home meter    Education provided: Contact [phone] with any changes, questions or concerns.     Plan made per [facility] anticoagulation protocol    Perla Barton RN  4/21/2025  Anticoagulation Clinic  [employer] for routing messages: p [facility] patient phone line: [phone]        _______________________________________________________________________     Anticoagulation Episode Summary       Current INR goal:  2.0-3.0   TTR:  90.5% (1 y)   Target end date:  Indefinite   Send INR reminders to:  [facility]    Indications    Pulmonary embolism with infarction (H) [I26.99] [I26.99]  Long-term (current) use of anticoagulants [Z79.01] [Z79.01]  Antithrombin deficiency  [D68.59]  History of pulmonary embolism [Z86.711]  Chronic anticoagulation [Z79.01]             Comments:  Prefers INR 2.5-3.0 goal range-ok with Hematology to aim for preferred goal range.  Okay to go 4 weeks in between INR's per Semaj 6/1/18             Anticoagulation Care Providers       Provider Role Specialty Phone number    Sinan Lagunas MD Referring Hematology [phone]

## 2025-05-13 ENCOUNTER — RESULTS FOLLOW-UP (OUTPATIENT)
Dept: ANTICOAGULATION | Facility: CLINIC | Age: 40
End: 2025-05-13
Payer: COMMERCIAL

## 2025-05-13 ENCOUNTER — ANTICOAGULATION THERAPY VISIT (OUTPATIENT)
Dept: ANTICOAGULATION | Facility: CLINIC | Age: 40
End: 2025-05-13
Payer: COMMERCIAL

## 2025-05-13 DIAGNOSIS — I26.99 PULMONARY EMBOLISM WITH INFARCTION (H): Primary | ICD-10-CM

## 2025-05-13 DIAGNOSIS — Z79.01 CHRONIC ANTICOAGULATION: ICD-10-CM

## 2025-05-13 DIAGNOSIS — D68.59 ANTITHROMBIN DEFICIENCY: ICD-10-CM

## 2025-05-13 DIAGNOSIS — Z79.01 LONG TERM CURRENT USE OF ANTICOAGULANT THERAPY: ICD-10-CM

## 2025-05-13 DIAGNOSIS — Z86.711 HISTORY OF PULMONARY EMBOLISM: ICD-10-CM

## 2025-05-13 LAB — INR HOME MONITORING: 2.5 (ref 2–3)

## 2025-05-13 NOTE — PROGRESS NOTES
ANTICOAGULATION MANAGEMENT     Hayden HARVEY [place] 39 year old male is on warfarin with therapeutic INR result. (Goal INR 2.0-3.0)    Recent labs: (last 7 days)     05/13/25  0000   INR 2.5       ASSESSMENT     Source(s): Chart Review and Patient/Caregiver Call     Warfarin doses taken: Warfarin taken as instructed  Diet: No new diet changes identified  Medication/supplement changes: None noted  New illness, injury, or hospitalization: No  Signs or symptoms of bleeding or clotting: No  Previous result: Therapeutic last 2(+) visits  Additional findings: None       PLAN     Recommended plan for no diet, medication or health factor changes affecting INR     Dosing Instructions: Continue your current warfarin dose with next INR in 4 weeks       Summary  As of 5/13/2025      Full warfarin instructions:  10 mg every Mon, Thu; 7.5 mg all other days   Next INR check:  6/10/2025               Telephone call with Hayden who verbalizes understanding and agrees to plan and who agrees to plan and repeated back plan correctly    Patient to recheck with home meter    Education provided: Please call back if any changes to your diet, medications or how you've been taking warfarin    Plan made per [facility] anticoagulation protocol    Trinity Padilla, RN  5/13/2025  Anticoagulation Clinic  [employer] for routing messages: larisa  ANTICOAG [facility] patient phone line: [phone]        _______________________________________________________________________     Anticoagulation Episode Summary       Current INR goal:  2.0-3.0   TTR:  96.5% (1 y)   Target end date:  Indefinite   Send INR reminders to:  [facility]    Indications    Pulmonary embolism with infarction (H) [I26.99] [I26.99]  Long-term (current) use of anticoagulants [Z79.01] [Z79.01]  Antithrombin deficiency [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  Okay to go 4 weeks in between INR's per Semaj 6/1/18             Anticoagulation Care Providers       Provider Role Specialty Phone number    Sinan Lagunas MD Referring Hematology [phone]

## 2025-06-17 ENCOUNTER — MYC MEDICAL ADVICE (OUTPATIENT)
Dept: ANTICOAGULATION | Facility: CLINIC | Age: 40
End: 2025-06-17
Payer: COMMERCIAL

## 2025-06-23 LAB — INR HOME MONITORING: 2.9 (ref 2–3)

## 2025-06-24 ENCOUNTER — RESULTS FOLLOW-UP (OUTPATIENT)
Dept: ANTICOAGULATION | Facility: CLINIC | Age: 40
End: 2025-06-24

## 2025-06-24 ENCOUNTER — ANTICOAGULATION THERAPY VISIT (OUTPATIENT)
Dept: ANTICOAGULATION | Facility: CLINIC | Age: 40
End: 2025-06-24
Payer: COMMERCIAL

## 2025-06-24 DIAGNOSIS — I26.99 PULMONARY EMBOLISM WITH INFARCTION (H): Primary | ICD-10-CM

## 2025-06-24 DIAGNOSIS — D68.59 ANTITHROMBIN DEFICIENCY: ICD-10-CM

## 2025-06-24 DIAGNOSIS — Z79.01 CHRONIC ANTICOAGULATION: ICD-10-CM

## 2025-06-24 DIAGNOSIS — Z86.711 HISTORY OF PULMONARY EMBOLISM: ICD-10-CM

## 2025-06-24 DIAGNOSIS — Z79.01 LONG TERM CURRENT USE OF ANTICOAGULANT THERAPY: ICD-10-CM

## 2025-06-24 NOTE — PROGRESS NOTES
ANTICOAGULATION MANAGEMENT     Hayden HARVEY [place] 39 year old male is on warfarin with therapeutic INR result. (Goal INR 2.0-3.0)    Recent labs: (last 7 days)     06/23/25  0000   INR 2.9       ASSESSMENT     Source(s): Chart Review and Patient/Caregiver Call     Warfarin doses taken: Warfarin taken as instructed  Diet: No new diet changes identified  Medication/supplement changes: None noted  New illness, injury, or hospitalization: No  Signs or symptoms of bleeding or clotting: No  Previous result: Therapeutic last 2(+) visits  Additional findings: None       PLAN     Recommended plan for no diet, medication or health factor changes affecting INR     Dosing Instructions: Continue your current warfarin dose with next INR in 4 weeks       Summary  As of 6/24/2025      Full warfarin instructions:  10 mg every Mon, Thu; 7.5 mg all other days   Next INR check:  7/21/2025               Telephone call with Hayden who verbalizes understanding and agrees to plan    Patient to recheck with home meter    Education provided: Please call back if any changes to your diet, medications or how you've been taking warfarin    Plan made per [facility] anticoagulation protocol    Deloris Lundberg RN  6/24/2025  Anticoagulation Clinic  [employer] for routing messages: p  [facility] phone [facility]: [phone]        _______________________________________________________________________     Anticoagulation Episode Summary       Current INR goal:  2.0-3.0   TTR:  100.0% (1 y)   Target end date:  Indefinite   Send INR reminders to:  [facility]    Indications    Pulmonary embolism with infarction (H) [I26.99] [I26.99]  Long-term (current) use of anticoagulants [Z79.01] [Z79.01]  Antithrombin deficiency [D68.59]  History of pulmonary embolism [Z86.711]  Chronic anticoagulation [Z79.01]             Comments:  Prefers INR 2.5-3.0 goal range-ok with Hematology to aim for preferred goal range.  Okay to go 4 weeks in between  INR's per [place] 6/1/18             Anticoagulation Care Providers       Provider Role Specialty Phone number    Sinan Lagunas MD Referring Hematology [phone]

## 2025-07-29 ENCOUNTER — MYC MEDICAL ADVICE (OUTPATIENT)
Dept: ANTICOAGULATION | Facility: CLINIC | Age: 40
End: 2025-07-29
Payer: COMMERCIAL

## 2025-07-29 ENCOUNTER — ANTICOAGULATION THERAPY VISIT (OUTPATIENT)
Dept: ANTICOAGULATION | Facility: CLINIC | Age: 40
End: 2025-07-29
Payer: COMMERCIAL

## 2025-07-29 DIAGNOSIS — Z79.01 LONG TERM CURRENT USE OF ANTICOAGULANT THERAPY: ICD-10-CM

## 2025-07-29 DIAGNOSIS — Z79.01 CHRONIC ANTICOAGULATION: ICD-10-CM

## 2025-07-29 DIAGNOSIS — D68.59 ANTITHROMBIN DEFICIENCY: ICD-10-CM

## 2025-07-29 DIAGNOSIS — I26.99 PULMONARY EMBOLISM WITH INFARCTION (H): Primary | ICD-10-CM

## 2025-07-29 DIAGNOSIS — Z86.711 HISTORY OF PULMONARY EMBOLISM: ICD-10-CM

## 2025-07-29 LAB — INR HOME MONITORING: 2.4 (ref 2–3)

## 2025-07-29 NOTE — PROGRESS NOTES
ANTICOAGULATION MANAGEMENT     Hayden HARVEY Blencoe 39 year old male is on warfarin with therapeutic INR result. (Goal INR 2.0-3.0)    Recent labs: (last 7 days)     07/29/25  0000   INR 2.4       ASSESSMENT     Source(s): Chart Review and Patient/Caregiver Call     Warfarin doses taken: Warfarin taken as instructed  Diet: No new diet changes identified  Medication/supplement changes: None noted  New illness, injury, or hospitalization: No  Signs or symptoms of bleeding or clotting: No  Previous result: Therapeutic last 2(+) visits  Additional findings: None       PLAN     Recommended plan for no diet, medication or health factor changes affecting INR     Dosing Instructions: Continue your current warfarin dose with next INR in 4 weeks       Summary  As of 7/29/2025      Full warfarin instructions:  10 mg every Mon, Thu; 7.5 mg all other days   Next INR check:  8/26/2025               Telephone call with Hayden who verbalizes understanding and agrees to plan and who agrees to plan and repeated back plan correctly    Patient to recheck with home meter    Education provided: Please call back if any changes to your diet, medications or how you've been taking warfarin    Plan made per [facility] anticoagulation protocol    Carmen Godinez RN  7/29/2025  Anticoagulation Clinic  [employer] Info [employer] for routing messages: larisa  ANTICOAG [facility] patient phone line: [phone]        _______________________________________________________________________     Anticoagulation Episode Summary       Current INR goal:  2.0-3.0   TTR:  100.0% (1 y)   Target end date:  Indefinite   Send INR reminders to:  [facility]    Indications    Pulmonary embolism with infarction (H) [I26.99] [I26.99]  Long-term (current) use of anticoagulants [Z79.01] [Z79.01]  Antithrombin deficiency [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  Okay to go 4 weeks in between INR's per Semaj 6/1/18             Anticoagulation Care Providers       Provider Role Specialty Phone number    Sinan Lagunas MD Referring Hematology [phone]

## 2025-08-16 ENCOUNTER — HEALTH MAINTENANCE LETTER (OUTPATIENT)
Age: 40
End: 2025-08-16

## 2025-09-04 LAB — INR HOME MONITORING: 2.4 (ref 2–3)
